# Patient Record
Sex: FEMALE | Race: WHITE | NOT HISPANIC OR LATINO | Employment: OTHER | URBAN - METROPOLITAN AREA
[De-identification: names, ages, dates, MRNs, and addresses within clinical notes are randomized per-mention and may not be internally consistent; named-entity substitution may affect disease eponyms.]

---

## 2018-01-22 ENCOUNTER — OFFICE VISIT CONVERTED (OUTPATIENT)
Dept: FAMILY MEDICINE CLINIC | Facility: CLINIC | Age: 68
End: 2018-01-22
Attending: NURSE PRACTITIONER

## 2018-03-09 ENCOUNTER — OFFICE VISIT CONVERTED (OUTPATIENT)
Dept: FAMILY MEDICINE CLINIC | Facility: CLINIC | Age: 68
End: 2018-03-09
Attending: NURSE PRACTITIONER

## 2018-05-16 ENCOUNTER — OFFICE VISIT CONVERTED (OUTPATIENT)
Dept: FAMILY MEDICINE CLINIC | Facility: CLINIC | Age: 68
End: 2018-05-16
Attending: NURSE PRACTITIONER

## 2018-09-28 ENCOUNTER — CONVERSION ENCOUNTER (OUTPATIENT)
Dept: OTHER | Facility: HOSPITAL | Age: 68
End: 2018-09-28

## 2018-11-14 ENCOUNTER — OFFICE VISIT CONVERTED (OUTPATIENT)
Dept: FAMILY MEDICINE CLINIC | Facility: CLINIC | Age: 68
End: 2018-11-14
Attending: NURSE PRACTITIONER

## 2019-03-04 ENCOUNTER — HOSPITAL ENCOUNTER (OUTPATIENT)
Dept: FAMILY MEDICINE CLINIC | Facility: CLINIC | Age: 69
Discharge: HOME | End: 2019-03-04
Attending: NURSE PRACTITIONER

## 2019-03-04 ENCOUNTER — OFFICE VISIT CONVERTED (OUTPATIENT)
Dept: FAMILY MEDICINE CLINIC | Facility: CLINIC | Age: 69
End: 2019-03-04
Attending: NURSE PRACTITIONER

## 2019-04-30 ENCOUNTER — OFFICE VISIT CONVERTED (OUTPATIENT)
Dept: FAMILY MEDICINE CLINIC | Facility: CLINIC | Age: 69
End: 2019-04-30
Attending: FAMILY MEDICINE

## 2019-08-21 ENCOUNTER — OFFICE VISIT CONVERTED (OUTPATIENT)
Dept: FAMILY MEDICINE CLINIC | Facility: CLINIC | Age: 69
End: 2019-08-21
Attending: FAMILY MEDICINE

## 2019-08-28 ENCOUNTER — OFFICE VISIT CONVERTED (OUTPATIENT)
Dept: FAMILY MEDICINE CLINIC | Facility: CLINIC | Age: 69
End: 2019-08-28
Attending: FAMILY MEDICINE

## 2019-09-11 ENCOUNTER — OFFICE VISIT CONVERTED (OUTPATIENT)
Dept: NEUROLOGY | Facility: CLINIC | Age: 69
End: 2019-09-11
Attending: PSYCHIATRY & NEUROLOGY

## 2019-09-18 ENCOUNTER — HOSPITAL ENCOUNTER (OUTPATIENT)
Dept: FAMILY MEDICINE CLINIC | Facility: CLINIC | Age: 69
Discharge: HOME | End: 2019-09-18
Attending: FAMILY MEDICINE

## 2019-09-18 ENCOUNTER — OFFICE VISIT CONVERTED (OUTPATIENT)
Dept: FAMILY MEDICINE CLINIC | Facility: CLINIC | Age: 69
End: 2019-09-18
Attending: FAMILY MEDICINE

## 2019-09-18 LAB
ALBUMIN SERPL-MCNC: 3.9 G/DL (ref 3.5–5)
ALBUMIN/GLOB SERPL: 1.4 {RATIO} (ref 1.4–2.6)
ALP SERPL-CCNC: 63 U/L (ref 43–160)
ALT SERPL-CCNC: 20 U/L (ref 10–40)
ANION GAP SERPL CALC-SCNC: 18 MMOL/L (ref 8–19)
AST SERPL-CCNC: 23 U/L (ref 15–50)
BILIRUB SERPL-MCNC: 0.37 MG/DL (ref 0.2–1.3)
BUN SERPL-MCNC: 12 MG/DL (ref 5–25)
BUN/CREAT SERPL: 14 {RATIO} (ref 6–20)
CALCIUM SERPL-MCNC: 9.1 MG/DL (ref 8.7–10.4)
CHLORIDE SERPL-SCNC: 100 MMOL/L (ref 99–111)
CONV CO2: 29 MMOL/L (ref 22–32)
CONV TOTAL PROTEIN: 6.7 G/DL (ref 6.3–8.2)
CREAT UR-MCNC: 0.84 MG/DL (ref 0.5–0.9)
EST. AVERAGE GLUCOSE BLD GHB EST-MCNC: 134 MG/DL
GFR SERPLBLD BASED ON 1.73 SQ M-ARVRAT: >60 ML/MIN/{1.73_M2}
GLOBULIN UR ELPH-MCNC: 2.8 G/DL (ref 2–3.5)
GLUCOSE SERPL-MCNC: 77 MG/DL (ref 65–99)
HBA1C MFR BLD: 6.3 % (ref 3.5–5.7)
OSMOLALITY SERPL CALC.SUM OF ELEC: 295 MOSM/KG (ref 273–304)
POTASSIUM SERPL-SCNC: 3.5 MMOL/L (ref 3.5–5.3)
SODIUM SERPL-SCNC: 143 MMOL/L (ref 135–147)

## 2019-11-08 ENCOUNTER — HOSPITAL ENCOUNTER (OUTPATIENT)
Dept: OTHER | Facility: HOSPITAL | Age: 69
Discharge: HOME | End: 2019-11-08
Attending: INTERNAL MEDICINE

## 2019-11-25 ENCOUNTER — HOSPITAL ENCOUNTER (OUTPATIENT)
Dept: FAMILY MEDICINE CLINIC | Facility: CLINIC | Age: 69
Discharge: HOME | End: 2019-11-25
Attending: NURSE PRACTITIONER

## 2019-11-25 ENCOUNTER — OFFICE VISIT CONVERTED (OUTPATIENT)
Dept: FAMILY MEDICINE CLINIC | Facility: CLINIC | Age: 69
End: 2019-11-25
Attending: NURSE PRACTITIONER

## 2019-11-25 LAB
BASOPHILS # BLD AUTO: 0.08 10*3/UL (ref 0–0.2)
BASOPHILS NFR BLD AUTO: 0.8 % (ref 0–3)
CONV ABS IMM GRAN: 0.03 10*3/UL (ref 0–0.2)
CONV IMMATURE GRAN: 0.3 % (ref 0–1.8)
DEPRECATED RDW RBC AUTO: 47.6 FL (ref 36.4–46.3)
EOSINOPHIL # BLD AUTO: 0.22 10*3/UL (ref 0–0.7)
EOSINOPHIL # BLD AUTO: 2.1 % (ref 0–7)
ERYTHROCYTE [DISTWIDTH] IN BLOOD BY AUTOMATED COUNT: 13.4 % (ref 11.7–14.4)
HCT VFR BLD AUTO: 44.6 % (ref 37–47)
HGB BLD-MCNC: 14.2 G/DL (ref 12–16)
LYMPHOCYTES # BLD AUTO: 3.18 10*3/UL (ref 1–5)
LYMPHOCYTES NFR BLD AUTO: 30.9 % (ref 20–45)
MCH RBC QN AUTO: 30.5 PG (ref 27–31)
MCHC RBC AUTO-ENTMCNC: 31.8 G/DL (ref 33–37)
MCV RBC AUTO: 95.9 FL (ref 81–99)
MONOCYTES # BLD AUTO: 0.96 10*3/UL (ref 0.2–1.2)
MONOCYTES NFR BLD AUTO: 9.3 % (ref 3–10)
NEUTROPHILS # BLD AUTO: 5.83 10*3/UL (ref 2–8)
NEUTROPHILS NFR BLD AUTO: 56.6 % (ref 30–85)
NRBC CBCN: 0 % (ref 0–0.7)
PLATELET # BLD AUTO: 249 10*3/UL (ref 130–400)
PMV BLD AUTO: 10.5 FL (ref 9.4–12.3)
RBC # BLD AUTO: 4.65 10*6/UL (ref 4.2–5.4)
WBC # BLD AUTO: 10.3 10*3/UL (ref 4.8–10.8)

## 2019-12-02 ENCOUNTER — HOSPITAL ENCOUNTER (OUTPATIENT)
Dept: OTHER | Facility: HOSPITAL | Age: 69
Discharge: HOME | End: 2019-12-02
Attending: NURSE PRACTITIONER

## 2019-12-28 ENCOUNTER — HOSPITAL ENCOUNTER (OUTPATIENT)
Dept: URGENT CARE | Facility: CLINIC | Age: 69
Discharge: HOME | End: 2019-12-28
Attending: NURSE PRACTITIONER

## 2020-01-09 ENCOUNTER — OFFICE VISIT CONVERTED (OUTPATIENT)
Dept: FAMILY MEDICINE CLINIC | Facility: CLINIC | Age: 70
End: 2020-01-09
Attending: NURSE PRACTITIONER

## 2020-01-10 ENCOUNTER — HOSPITAL ENCOUNTER (OUTPATIENT)
Dept: CARDIOLOGY | Facility: HOSPITAL | Age: 70
Discharge: HOME | End: 2020-01-10
Attending: NURSE PRACTITIONER

## 2020-01-20 ENCOUNTER — CONVERSION ENCOUNTER (OUTPATIENT)
Dept: ORTHOPEDIC SURGERY | Facility: CLINIC | Age: 70
End: 2020-01-20

## 2020-01-20 ENCOUNTER — OFFICE VISIT CONVERTED (OUTPATIENT)
Dept: ORTHOPEDIC SURGERY | Facility: CLINIC | Age: 70
End: 2020-01-20
Attending: PHYSICIAN ASSISTANT

## 2020-03-17 ENCOUNTER — HOSPITAL ENCOUNTER (OUTPATIENT)
Dept: OTHER | Facility: HOSPITAL | Age: 70
Discharge: HOME | End: 2020-03-17

## 2020-03-17 LAB
CREAT BLD-MCNC: 0.9 MG/DL (ref 0.6–1.4)
GFR SERPLBLD BASED ON 1.73 SQ M-ARVRAT: >60 ML/MIN/{1.73_M2}

## 2020-04-17 ENCOUNTER — HOSPITAL ENCOUNTER (OUTPATIENT)
Dept: FAMILY MEDICINE CLINIC | Facility: CLINIC | Age: 70
Discharge: HOME | End: 2020-04-17
Attending: FAMILY MEDICINE

## 2020-04-17 ENCOUNTER — OFFICE VISIT CONVERTED (OUTPATIENT)
Dept: FAMILY MEDICINE CLINIC | Facility: CLINIC | Age: 70
End: 2020-04-17
Attending: FAMILY MEDICINE

## 2020-04-18 LAB
ALBUMIN SERPL-MCNC: 4.2 G/DL (ref 3.5–5)
ALBUMIN/GLOB SERPL: 1.4 {RATIO} (ref 1.4–2.6)
ALP SERPL-CCNC: 49 U/L (ref 43–160)
ALT SERPL-CCNC: 22 U/L (ref 10–40)
ANION GAP SERPL CALC-SCNC: 17 MMOL/L (ref 8–19)
AST SERPL-CCNC: 27 U/L (ref 15–50)
BASOPHILS # BLD AUTO: 0.07 10*3/UL (ref 0–0.2)
BASOPHILS NFR BLD AUTO: 0.6 % (ref 0–3)
BILIRUB SERPL-MCNC: 0.44 MG/DL (ref 0.2–1.3)
BUN SERPL-MCNC: 11 MG/DL (ref 5–25)
BUN/CREAT SERPL: 13 {RATIO} (ref 6–20)
CALCIUM SERPL-MCNC: 9.6 MG/DL (ref 8.7–10.4)
CHLORIDE SERPL-SCNC: 98 MMOL/L (ref 99–111)
CONV ABS IMM GRAN: 0.05 10*3/UL (ref 0–0.2)
CONV CO2: 26 MMOL/L (ref 22–32)
CONV IMMATURE GRAN: 0.5 % (ref 0–1.8)
CONV TOTAL PROTEIN: 7.1 G/DL (ref 6.3–8.2)
CREAT UR-MCNC: 0.87 MG/DL (ref 0.5–0.9)
DEPRECATED RDW RBC AUTO: 45.5 FL (ref 36.4–46.3)
EOSINOPHIL # BLD AUTO: 0.35 10*3/UL (ref 0–0.7)
EOSINOPHIL # BLD AUTO: 3.2 % (ref 0–7)
ERYTHROCYTE [DISTWIDTH] IN BLOOD BY AUTOMATED COUNT: 12.8 % (ref 11.7–14.4)
GFR SERPLBLD BASED ON 1.73 SQ M-ARVRAT: >60 ML/MIN/{1.73_M2}
GLOBULIN UR ELPH-MCNC: 2.9 G/DL (ref 2–3.5)
GLUCOSE SERPL-MCNC: 98 MG/DL (ref 65–99)
HCT VFR BLD AUTO: 43.1 % (ref 37–47)
HGB BLD-MCNC: 13.8 G/DL (ref 12–16)
LYMPHOCYTES # BLD AUTO: 2.12 10*3/UL (ref 1–5)
LYMPHOCYTES NFR BLD AUTO: 19.2 % (ref 20–45)
MAGNESIUM SERPL-MCNC: 1.66 MG/DL (ref 1.6–2.3)
MCH RBC QN AUTO: 30.7 PG (ref 27–31)
MCHC RBC AUTO-ENTMCNC: 32 G/DL (ref 33–37)
MCV RBC AUTO: 95.8 FL (ref 81–99)
MONOCYTES # BLD AUTO: 1.01 10*3/UL (ref 0.2–1.2)
MONOCYTES NFR BLD AUTO: 9.2 % (ref 3–10)
NEUTROPHILS # BLD AUTO: 7.42 10*3/UL (ref 2–8)
NEUTROPHILS NFR BLD AUTO: 67.3 % (ref 30–85)
NRBC CBCN: 0 % (ref 0–0.7)
OSMOLALITY SERPL CALC.SUM OF ELEC: 283 MOSM/KG (ref 273–304)
PLATELET # BLD AUTO: 235 10*3/UL (ref 130–400)
PMV BLD AUTO: 10.8 FL (ref 9.4–12.3)
POTASSIUM SERPL-SCNC: 3.7 MMOL/L (ref 3.5–5.3)
RBC # BLD AUTO: 4.5 10*6/UL (ref 4.2–5.4)
SODIUM SERPL-SCNC: 137 MMOL/L (ref 135–147)
WBC # BLD AUTO: 11.02 10*3/UL (ref 4.8–10.8)

## 2020-05-01 ENCOUNTER — HOSPITAL ENCOUNTER (OUTPATIENT)
Dept: FAMILY MEDICINE CLINIC | Facility: CLINIC | Age: 70
Discharge: HOME | End: 2020-05-01
Attending: FAMILY MEDICINE

## 2020-05-01 LAB
BASOPHILS # BLD AUTO: 0.07 10*3/UL (ref 0–0.2)
BASOPHILS # BLD: 0 % (ref 0–3)
BASOPHILS NFR BLD AUTO: 0.7 % (ref 0–3)
CONV ABS BANDS: 0 % (ref 1–5)
CONV ABS IMM GRAN: 0.03 10*3/UL (ref 0–0.2)
CONV ANISOCYTES: ABNORMAL
CONV ATYPICAL LYMPHOCYTES: 5 % (ref 0–5)
CONV IMMATURE GRAN: 0.3 % (ref 0–1.8)
CONV SEGMENTED NEUTROPHILS: 53 % (ref 45–70)
DEPRECATED RDW RBC AUTO: 47.8 FL (ref 36.4–46.3)
EOSINOPHIL # BLD AUTO: 0.28 10*3/UL (ref 0–0.7)
EOSINOPHIL # BLD AUTO: 2.9 % (ref 0–7)
EOSINOPHIL NFR BLD AUTO: 1 % (ref 0–7)
ERYTHROCYTE [DISTWIDTH] IN BLOOD BY AUTOMATED COUNT: 13.1 % (ref 11.7–14.4)
HCT VFR BLD AUTO: 42.6 % (ref 37–47)
HGB BLD-MCNC: 13.4 G/DL (ref 12–16)
LARGE PLATELETS: ABNORMAL
LYMPHOCYTES # BLD AUTO: 3.64 10*3/UL (ref 1–5)
LYMPHOCYTES NFR BLD AUTO: 37.3 % (ref 20–45)
MCH RBC QN AUTO: 30.9 PG (ref 27–31)
MCHC RBC AUTO-ENTMCNC: 31.5 G/DL (ref 33–37)
MCV RBC AUTO: 98.4 FL (ref 81–99)
MONOCYTES # BLD AUTO: 0.7 10*3/UL (ref 0.2–1.2)
MONOCYTES NFR BLD AUTO: 7.2 % (ref 3–10)
MONOCYTES NFR BLD MANUAL: 5 % (ref 3–10)
NEUTROPHILS # BLD AUTO: 5.03 10*3/UL (ref 2–8)
NEUTROPHILS NFR BLD AUTO: 51.6 % (ref 30–85)
NRBC CBCN: 0 % (ref 0–0.7)
NUC CELL # PRT MANUAL: 0 /100{WBCS}
PLAT MORPH BLD: NORMAL
PLATELET # BLD AUTO: 254 10*3/UL (ref 130–400)
PMV BLD AUTO: 10.2 FL (ref 9.4–12.3)
RBC # BLD AUTO: 4.33 10*6/UL (ref 4.2–5.4)
SMALL PLATELETS BLD QL SMEAR: ADEQUATE
VARIANT LYMPHS NFR BLD MANUAL: 36 % (ref 20–45)
WBC # BLD AUTO: 9.75 10*3/UL (ref 4.8–10.8)

## 2020-05-19 ENCOUNTER — OFFICE VISIT CONVERTED (OUTPATIENT)
Dept: FAMILY MEDICINE CLINIC | Facility: CLINIC | Age: 70
End: 2020-05-19
Attending: NURSE PRACTITIONER

## 2020-05-19 ENCOUNTER — CONVERSION ENCOUNTER (OUTPATIENT)
Dept: FAMILY MEDICINE CLINIC | Facility: CLINIC | Age: 70
End: 2020-05-19

## 2020-06-10 ENCOUNTER — OFFICE VISIT CONVERTED (OUTPATIENT)
Dept: ORTHOPEDIC SURGERY | Facility: CLINIC | Age: 70
End: 2020-06-10
Attending: PHYSICIAN ASSISTANT

## 2020-06-19 ENCOUNTER — HOSPITAL ENCOUNTER (OUTPATIENT)
Dept: OTHER | Facility: HOSPITAL | Age: 70
Discharge: HOME | End: 2020-06-19
Attending: PHYSICIAN ASSISTANT

## 2020-06-24 ENCOUNTER — OFFICE VISIT CONVERTED (OUTPATIENT)
Dept: NEUROLOGY | Facility: CLINIC | Age: 70
End: 2020-06-24
Attending: NURSE PRACTITIONER

## 2020-06-26 ENCOUNTER — OFFICE VISIT CONVERTED (OUTPATIENT)
Dept: ORTHOPEDIC SURGERY | Facility: CLINIC | Age: 70
End: 2020-06-26
Attending: PHYSICIAN ASSISTANT

## 2020-06-29 ENCOUNTER — HOSPITAL ENCOUNTER (OUTPATIENT)
Dept: PREADMISSION TESTING | Facility: HOSPITAL | Age: 70
Discharge: HOME | End: 2020-06-29
Attending: ORTHOPAEDIC SURGERY

## 2020-06-30 LAB — SARS-COV-2 RNA SPEC QL NAA+PROBE: NOT DETECTED

## 2020-07-02 ENCOUNTER — HOSPITAL ENCOUNTER (OUTPATIENT)
Dept: PERIOP | Facility: HOSPITAL | Age: 70
Setting detail: HOSPITAL OUTPATIENT SURGERY
Discharge: HOME | End: 2020-07-02
Attending: ORTHOPAEDIC SURGERY

## 2020-07-15 ENCOUNTER — OFFICE VISIT CONVERTED (OUTPATIENT)
Dept: ORTHOPEDIC SURGERY | Facility: CLINIC | Age: 70
End: 2020-07-15
Attending: PHYSICIAN ASSISTANT

## 2020-08-08 ENCOUNTER — HOSPITAL ENCOUNTER (OUTPATIENT)
Dept: URGENT CARE | Facility: CLINIC | Age: 70
Discharge: HOME | End: 2020-08-08
Attending: EMERGENCY MEDICINE

## 2020-08-19 ENCOUNTER — HOSPITAL ENCOUNTER (OUTPATIENT)
Dept: OTHER | Facility: HOSPITAL | Age: 70
Discharge: HOME | End: 2020-08-19

## 2020-08-19 ENCOUNTER — OFFICE VISIT CONVERTED (OUTPATIENT)
Dept: ORTHOPEDIC SURGERY | Facility: CLINIC | Age: 70
End: 2020-08-19
Attending: PHYSICIAN ASSISTANT

## 2020-08-19 LAB
CREAT BLD-MCNC: 0.7 MG/DL (ref 0.6–1.4)
GFR SERPLBLD BASED ON 1.73 SQ M-ARVRAT: >60 ML/MIN/{1.73_M2}

## 2020-08-26 ENCOUNTER — OFFICE VISIT CONVERTED (OUTPATIENT)
Dept: FAMILY MEDICINE CLINIC | Facility: CLINIC | Age: 70
End: 2020-08-26
Attending: NURSE PRACTITIONER

## 2020-09-29 ENCOUNTER — HOSPITAL ENCOUNTER (OUTPATIENT)
Dept: NEUROLOGY | Facility: HOSPITAL | Age: 70
Discharge: HOME | End: 2020-09-29
Attending: NURSE PRACTITIONER

## 2020-10-16 ENCOUNTER — OFFICE VISIT CONVERTED (OUTPATIENT)
Dept: ORTHOPEDIC SURGERY | Facility: CLINIC | Age: 70
End: 2020-10-16
Attending: PHYSICIAN ASSISTANT

## 2020-10-19 ENCOUNTER — HOSPITAL ENCOUNTER (OUTPATIENT)
Dept: FAMILY MEDICINE CLINIC | Facility: CLINIC | Age: 70
Discharge: HOME | End: 2020-10-19
Attending: NURSE PRACTITIONER

## 2020-10-19 LAB
ALBUMIN SERPL-MCNC: 3.8 G/DL (ref 3.5–5)
ALBUMIN/GLOB SERPL: 1.4 {RATIO} (ref 1.4–2.6)
ALP SERPL-CCNC: 74 U/L (ref 43–160)
ALT SERPL-CCNC: 15 U/L (ref 10–40)
ANION GAP SERPL CALC-SCNC: 13 MMOL/L (ref 8–19)
AST SERPL-CCNC: 16 U/L (ref 15–50)
BASOPHILS # BLD AUTO: 0.06 10*3/UL (ref 0–0.2)
BASOPHILS NFR BLD AUTO: 0.5 % (ref 0–3)
BILIRUB SERPL-MCNC: 0.23 MG/DL (ref 0.2–1.3)
BUN SERPL-MCNC: 17 MG/DL (ref 5–25)
BUN/CREAT SERPL: 21 {RATIO} (ref 6–20)
CALCIUM SERPL-MCNC: 9.7 MG/DL (ref 8.7–10.4)
CHLORIDE SERPL-SCNC: 100 MMOL/L (ref 99–111)
CONV ABS IMM GRAN: 0.14 10*3/UL (ref 0–0.2)
CONV CO2: 32 MMOL/L (ref 22–32)
CONV IMMATURE GRAN: 1.2 % (ref 0–1.8)
CONV TOTAL PROTEIN: 6.6 G/DL (ref 6.3–8.2)
CREAT UR-MCNC: 0.81 MG/DL (ref 0.5–0.9)
DEPRECATED RDW RBC AUTO: 48 FL (ref 36.4–46.3)
EOSINOPHIL # BLD AUTO: 0.11 10*3/UL (ref 0–0.7)
EOSINOPHIL # BLD AUTO: 0.9 % (ref 0–7)
ERYTHROCYTE [DISTWIDTH] IN BLOOD BY AUTOMATED COUNT: 13.9 % (ref 11.7–14.4)
EST. AVERAGE GLUCOSE BLD GHB EST-MCNC: 131 MG/DL
GFR SERPLBLD BASED ON 1.73 SQ M-ARVRAT: >60 ML/MIN/{1.73_M2}
GLOBULIN UR ELPH-MCNC: 2.8 G/DL (ref 2–3.5)
GLUCOSE SERPL-MCNC: 85 MG/DL (ref 65–99)
HBA1C MFR BLD: 6.2 % (ref 3.5–5.7)
HCT VFR BLD AUTO: 42.7 % (ref 37–47)
HGB BLD-MCNC: 13.3 G/DL (ref 12–16)
LYMPHOCYTES # BLD AUTO: 3.07 10*3/UL (ref 1–5)
LYMPHOCYTES NFR BLD AUTO: 26.1 % (ref 20–45)
MCH RBC QN AUTO: 29.6 PG (ref 27–31)
MCHC RBC AUTO-ENTMCNC: 31.1 G/DL (ref 33–37)
MCV RBC AUTO: 94.9 FL (ref 81–99)
MONOCYTES # BLD AUTO: 0.77 10*3/UL (ref 0.2–1.2)
MONOCYTES NFR BLD AUTO: 6.5 % (ref 3–10)
NEUTROPHILS # BLD AUTO: 7.61 10*3/UL (ref 2–8)
NEUTROPHILS NFR BLD AUTO: 64.8 % (ref 30–85)
NRBC CBCN: 0 % (ref 0–0.7)
OSMOLALITY SERPL CALC.SUM OF ELEC: 293 MOSM/KG (ref 273–304)
PLATELET # BLD AUTO: 265 10*3/UL (ref 130–400)
PMV BLD AUTO: 10 FL (ref 9.4–12.3)
POTASSIUM SERPL-SCNC: 3.9 MMOL/L (ref 3.5–5.3)
RBC # BLD AUTO: 4.5 10*6/UL (ref 4.2–5.4)
SODIUM SERPL-SCNC: 141 MMOL/L (ref 135–147)
T4 FREE SERPL-MCNC: 1 NG/DL (ref 0.9–1.8)
TSH SERPL-ACNC: 1.86 M[IU]/L (ref 0.27–4.2)
WBC # BLD AUTO: 11.76 10*3/UL (ref 4.8–10.8)

## 2020-10-20 LAB — HCV AB SER DONR QL: <0.1 S/CO RATIO (ref 0–0.9)

## 2020-10-23 ENCOUNTER — HOSPITAL ENCOUNTER (OUTPATIENT)
Dept: OTHER | Facility: HOSPITAL | Age: 70
Discharge: HOME | End: 2020-10-23
Attending: NURSE PRACTITIONER

## 2020-10-27 ENCOUNTER — OFFICE VISIT CONVERTED (OUTPATIENT)
Dept: NEUROLOGY | Facility: CLINIC | Age: 70
End: 2020-10-27
Attending: NURSE PRACTITIONER

## 2020-11-05 ENCOUNTER — HOSPITAL ENCOUNTER (OUTPATIENT)
Dept: OTHER | Facility: HOSPITAL | Age: 70
Discharge: HOME | End: 2020-11-05
Attending: NURSE PRACTITIONER

## 2020-11-24 ENCOUNTER — OFFICE VISIT CONVERTED (OUTPATIENT)
Dept: FAMILY MEDICINE CLINIC | Facility: CLINIC | Age: 70
End: 2020-11-24
Attending: NURSE PRACTITIONER

## 2021-01-13 ENCOUNTER — HOSPITAL ENCOUNTER (OUTPATIENT)
Dept: FAMILY MEDICINE CLINIC | Facility: CLINIC | Age: 71
Discharge: HOME | End: 2021-01-13
Attending: NURSE PRACTITIONER

## 2021-01-13 ENCOUNTER — OFFICE VISIT CONVERTED (OUTPATIENT)
Dept: FAMILY MEDICINE CLINIC | Facility: CLINIC | Age: 71
End: 2021-01-13
Attending: FAMILY MEDICINE

## 2021-01-14 LAB — SARS-COV-2 RNA SPEC QL NAA+PROBE: NOT DETECTED

## 2021-01-27 ENCOUNTER — OFFICE VISIT CONVERTED (OUTPATIENT)
Dept: NEUROLOGY | Facility: CLINIC | Age: 71
End: 2021-01-27
Attending: NURSE PRACTITIONER

## 2021-03-04 ENCOUNTER — OFFICE VISIT CONVERTED (OUTPATIENT)
Dept: FAMILY MEDICINE CLINIC | Facility: CLINIC | Age: 71
End: 2021-03-04
Attending: NURSE PRACTITIONER

## 2021-03-04 ENCOUNTER — HOSPITAL ENCOUNTER (OUTPATIENT)
Dept: FAMILY MEDICINE CLINIC | Facility: CLINIC | Age: 71
Discharge: HOME | End: 2021-03-04
Attending: NURSE PRACTITIONER

## 2021-03-04 LAB
ALBUMIN SERPL-MCNC: 3.4 G/DL (ref 3.5–5)
ALBUMIN/GLOB SERPL: 1.1 {RATIO} (ref 1.4–2.6)
ALP SERPL-CCNC: 65 U/L (ref 43–160)
ALT SERPL-CCNC: 24 U/L (ref 10–40)
ANION GAP SERPL CALC-SCNC: 17 MMOL/L (ref 8–19)
AST SERPL-CCNC: 28 U/L (ref 15–50)
BASOPHILS # BLD AUTO: 0.08 10*3/UL (ref 0–0.2)
BASOPHILS NFR BLD AUTO: 1 % (ref 0–3)
BILIRUB SERPL-MCNC: 0.2 MG/DL (ref 0.2–1.3)
BUN SERPL-MCNC: 11 MG/DL (ref 5–25)
BUN/CREAT SERPL: 13 {RATIO} (ref 6–20)
CALCIUM SERPL-MCNC: 9.6 MG/DL (ref 8.7–10.4)
CHLORIDE SERPL-SCNC: 97 MMOL/L (ref 99–111)
CONV ABS IMM GRAN: 0.08 10*3/UL (ref 0–0.2)
CONV CO2: 28 MMOL/L (ref 22–32)
CONV IMMATURE GRAN: 1 % (ref 0–1.8)
CONV TOTAL PROTEIN: 6.4 G/DL (ref 6.3–8.2)
CREAT UR-MCNC: 0.84 MG/DL (ref 0.5–0.9)
DEPRECATED RDW RBC AUTO: 47 FL (ref 36.4–46.3)
EOSINOPHIL # BLD AUTO: 0.62 10*3/UL (ref 0–0.7)
EOSINOPHIL # BLD AUTO: 7.6 % (ref 0–7)
ERYTHROCYTE [DISTWIDTH] IN BLOOD BY AUTOMATED COUNT: 13.6 % (ref 11.7–14.4)
FOLATE SERPL-MCNC: 6.1 NG/ML (ref 4.8–20)
GFR SERPLBLD BASED ON 1.73 SQ M-ARVRAT: >60 ML/MIN/{1.73_M2}
GLOBULIN UR ELPH-MCNC: 3 G/DL (ref 2–3.5)
GLUCOSE SERPL-MCNC: 124 MG/DL (ref 65–99)
HCT VFR BLD AUTO: 32.9 % (ref 37–47)
HGB BLD-MCNC: 10.6 G/DL (ref 12–16)
LYMPHOCYTES # BLD AUTO: 1.35 10*3/UL (ref 1–5)
LYMPHOCYTES NFR BLD AUTO: 16.5 % (ref 20–45)
MCH RBC QN AUTO: 30.8 PG (ref 27–31)
MCHC RBC AUTO-ENTMCNC: 32.2 G/DL (ref 33–37)
MCV RBC AUTO: 95.6 FL (ref 81–99)
MONOCYTES # BLD AUTO: 0.67 10*3/UL (ref 0.2–1.2)
MONOCYTES NFR BLD AUTO: 8.2 % (ref 3–10)
NEUTROPHILS # BLD AUTO: 5.37 10*3/UL (ref 2–8)
NEUTROPHILS NFR BLD AUTO: 65.7 % (ref 30–85)
NRBC CBCN: 0 % (ref 0–0.7)
OSMOLALITY SERPL CALC.SUM OF ELEC: 287 MOSM/KG (ref 273–304)
PLATELET # BLD AUTO: 262 10*3/UL (ref 130–400)
PMV BLD AUTO: 10.5 FL (ref 9.4–12.3)
POTASSIUM SERPL-SCNC: 3.5 MMOL/L (ref 3.5–5.3)
RBC # BLD AUTO: 3.44 10*6/UL (ref 4.2–5.4)
SODIUM SERPL-SCNC: 138 MMOL/L (ref 135–147)
VIT B12 SERPL-MCNC: 624 PG/ML (ref 211–911)
WBC # BLD AUTO: 8.17 10*3/UL (ref 4.8–10.8)

## 2021-03-05 LAB
FERRITIN SERPL-MCNC: 332 NG/ML (ref 10–200)
IRON SATN MFR SERPL: 20 % (ref 20–55)
IRON SERPL-MCNC: 51 UG/DL (ref 60–170)
TIBC SERPL-MCNC: 260 UG/DL (ref 245–450)
TRANSFERRIN SERPL-MCNC: 182 MG/DL (ref 250–380)

## 2021-04-01 ENCOUNTER — HOSPITAL ENCOUNTER (OUTPATIENT)
Dept: OTHER | Facility: HOSPITAL | Age: 71
Discharge: HOME | End: 2021-04-01

## 2021-04-27 ENCOUNTER — OFFICE VISIT CONVERTED (OUTPATIENT)
Dept: NEUROLOGY | Facility: CLINIC | Age: 71
End: 2021-04-27
Attending: NURSE PRACTITIONER

## 2021-04-29 ENCOUNTER — CONVERSION ENCOUNTER (OUTPATIENT)
Dept: FAMILY MEDICINE CLINIC | Facility: CLINIC | Age: 71
End: 2021-04-29

## 2021-04-29 ENCOUNTER — OFFICE VISIT CONVERTED (OUTPATIENT)
Dept: FAMILY MEDICINE CLINIC | Facility: CLINIC | Age: 71
End: 2021-04-29
Attending: NURSE PRACTITIONER

## 2021-05-07 NOTE — PROGRESS NOTES
"   Progress Note      Patient Name: Suzanne Gutierrez   Patient ID: [id number]   Sex: Female   YOB: 1950        Visit Date: April 17, 2020    Provider: Delta Scott MD   Location: [facility]   Location Address: [address] [place] [address] [place]  [address]   Location Phone: [phone]          Chief Complaint     legs/hips hurting during the night, and has had diarrhea and headache for about 1 week.       History Of Present Illness  Suzanne Gutierrez is a 69 year old /White female who presents for evaluation and treatment of:      symptoms x 1 week- had been sick but is feeling better today- had had diarrhea and headache earlier in week- these symptoms resolved as of today- all symptoms have resolved except for both hips and legs are hurting x 1 day- no known injury- pt had feeling of needing to move legs- movement helps symptoms- sitting still worsens symptoms- aching pain that begins in both hips and goes down both legs to posterior knees- no swelling, or redness or warmth       Past Medical History  Disease Name Date Onset Notes   Insomnia --  --    Pulmonary fibrosis --  --    Screening Mammogram 10/11/18 --    Sleep apnea --  --          Past Surgical History  Procedure Name Date Notes   Cholecstectomy --  --    Hysterectomy --  --    Tonsillectomy --  --    Tubal ligation --  --          Medication List  Name Date Started Instructions   Actimmune 100 mcg/0.5 mL subcutaneous solution  inject 1 milliliter by subcutaneous route   amitriptyline 100 mg oral tablet 03/02/2020 take 1 tablet (100 mg) by oral route once daily at bedtime for 90 days   BD Insulin Syringe Ultra-Fine 0.5 mL 30 gauge x 1/2\" miscellaneous syringe 12/11/2018 USE THREE TIMES WEEKLY   BD Specialty Use Needles 30 gauge x 1/2\" miscellaneous needle 11/14/2018 use as directed for 90 days use for the 3 days weekly inections of the actimune   BD Syringe 1 mL miscellaneous syringe 11/14/2018 use as directed " for 90 days use for 3 days weekly injections of the actimune   ipratropium-albuterol 0.5 mg-3 mg(2.5 mg base)/3 mL inhalation solution for nebulization 01/29/2020 use in nebulizer as directed 3 times a day for 30 days   Symbicort 160-4.5 mcg/actuation inhalation HFA aerosol inhaler  inhale 2 puffs by inhalation route 2 times per day in the morning and evening   Ventolin HFA 90 mcg/actuation inhalation HFA aerosol inhaler  inhale 1 puff (90 mcg) by inhalation route every 6 hours as needed         Allergy List  Allergen Name Date Reaction Notes   Keflex --  --  --        Allergies Reconciled  Family Medical History  Disease Name Relative/Age Notes   Alcohol abuse Father/   Father   Family History Of Breast Cancer Sister/   Sister         Social History  Finding Status Start/Stop Quantity Notes   Alcohol Never --/-- --  never drinks alcohol   Exercises regularly --  --/-- --  0 times per week   Recreational Drug Use Never --/-- --  never used   Second hand smoke exposure Unknown --/-- --  no   Tobacco Former --/54 0.5 PPD smokes less than 1 pack per day, for 15 years, in the past used other tobacco products   Uses seatbelts --  --/-- --  yes         Immunizations  NameDate Admin Mfg Trade Name Lot Number Route Inj VIS Given VIS Publication   [id number]/03/2019 NE Not Entered  NE NE 07/05/2019    Comments:    Ryxdcllk79/03/2019 NE Not Entered  NE NE 07/05/2019    Comments:          Review of Systems  · Constitutional  o Denies  o : fatigue, fever, chills, body aches  · HENT  o Denies  o : nasal congestion, nasal discharge, sore throat, ear pain  · Cardiovascular  o Denies  o : chest pain, palpitations  · Respiratory  o Denies  o : shortness of breath, cough  · Gastrointestinal  o Admits  o : diarrhea  o Denies  o : nausea, vomiting, abdominal pain  · Integument  o Denies  o : rash  · Musculoskeletal  o Admits  o : hip pain      Vitals  Date Time BP Position Site L\R Cuff Size HR RR TEMP (F) WT  HT  BMI kg/m2 BSA m2  O2 Sat        04/17/2020 01:37 /80 Sitting    121 - R  97.6     91 %          Physical Examination  · Constitutional  o Appearance  o : well developed, well-nourished, in no acute distress  · Head and Face  o HEENT  o : Unremarkable, MMM  · Respiratory  o Respiratory Effort  o : breathing unlabored  o Auscultation of Lungs  o : clear to ascultation  · Cardiovascular  o Heart  o :   § Auscultation of Heart  § : regular rate and rhythm  o Peripheral Vascular System  o :   § Extremities  § : no edema  · Gastrointestinal  o Abdomen  o : soft, non-tender, non-distended, + bowel sounds, no hepatosplenomegaly, no masses palpated  · Musculoskeletal  o General  o :   § General Musculoskeletal  § : No joint swelling or deformity., Muscle tone, strength, and development grossly normal. Bilateral hip slight tenderness on movement only, no swelling, redness, or warmth  · Neurologic  o Gait and Station  o :   § Gait Screening  § : normal gait  · Psychiatric  o Mood and Affect  o : mood normal, affect appropriate          Assessment  · Hip pain     719.45/M25.559  · Leg pain     729.5/M79.606      Plan  · Orders  o CBC with Auto Diff [facility] (34526) - 719.45/M25.559, 729.5/M79.606 - 04/17/2020  o CMP [facility] ([id number]) - 719.45/M25.559, 729.5/M79.606 - 04/17/2020  o ACO-39: Current medications updated and reviewed () - - 04/17/2020  o Magnesium level (07855) - 719.45/M25.559, 729.5/M79.606 - 04/17/2020  o Xray hip right 2 or more views (includes AP Pelvis) [facility] (95962) - 719.45/M25.559, 729.5/M79.606 - 04/17/2020  o Xray hip left 2 or more views (includes AP Pelvis) [facility] (01973) - 719.45/M25.559, 729.5/M79.606 - 04/17/2020  · Medications  o prednisone 20 mg oral tablet   SIG: take 3 tablets by oral route daily for 5 days   DISP: (15) tablets with 0 refills  Prescribed on 04/17/2020     o Medications have been Reconciled  o Transition of Care or Provider Policy  · Instructions  o Patient was  educated/instructed on their diagnosis, treatment and medications prior to discharge from the clinic today.            Electronically Signed by: Delta Scott MD -Author on April 17, 2020 02:42:36 PM

## 2021-05-07 NOTE — PROGRESS NOTES
"   Progress Note      Patient Name: Suzanne Gutierrez   Patient ID: [id number]   Sex: Female   YOB: 1950        Visit Date: April 29, 2021    Provider: ALBAN Severino   Location: [facility]   Location Address: [address] [place] [address]   [place]  [address]   Location Phone: [phone]          Chief Complaint     PATIENT IS HERE FOR SINUS PRESSURE AND PAIN.  HER EARS ARE ITCHING AND SHE HAS A SLIGHT COUGH.  THIS ALL HAS BEEN GOING ON FOR ABOUT 3 WEEKS.      SHE HAS SOME RASH OR SOMETHING GOING ON WITH HER LEFT LITTLE TOE.       History Of Present Illness  Suzanne Gutierrez is a 70 year old /White female who presents for evaluation and treatment of:      PT HERE TODAY FOR THE SYMPTOMS OF THE PAST 3 WEEKS SINUS INFECTION LIKE PRESSURE AND THEN DRAINS TO THROAT INTO THE CHEST--AND THEN PT WITH PULMONARY FIBROSIS AND ON O2 AT HOME AND BEEN USING HER NEB TXS AS WELL     no fever  no loss of tatse or smell  no N/V/D       Past Medical History  Disease Name Date Onset Notes   Insomnia --  --    Pre-diabetes --  --    Pulmonary fibrosis --  --    Screening Mammogram 10/11/18 --    Sleep apnea --  --    Vertigo --  --          Past Surgical History  Procedure Name Date Notes   Cholecstectomy --  --    Hysterectomy --  --    Tonsillectomy --  --    Tubal ligation --  --          Medication List  Name Date Started Instructions   Actimmune 100 mcg/0.5 mL subcutaneous solution  inject 1 milliliter by subcutaneous route   albuterol sulfate 90 mcg/actuation inhalation HFA aerosol inhaler  --    Replaced/Ret[employer] Drug 90 mcg/Actuation inhalation aerosol 12/15/2020 INHALE 1 PUFF BY INHALATION ROUTE EVERY 6 HOURS AS NEEDED FOR 30 DAYS   amitriptyline 100 mg oral tablet 02/16/2021 TAKE 1 TABLET BY MOUTH EVERY NIGHT AT BEDTIME   aspirin 81 mg oral tablet,chewable  chew 1 tablet (81 mg) by oral route once daily   BD Insulin Syringe Ultra-Fine 0.5 mL 30 gauge x 1/2\" miscellaneous syringe " "12/11/2018 USE THREE TIMES WEEKLY   BD Specialty Use Needles 30 gauge x 1/2\" miscellaneous needle 11/14/2018 use as directed for 90 days use for the 3 days weekly inections of the actimune   BD Syringe 1 mL miscellaneous syringe 11/14/2018 use as directed for 90 days use for 3 days weekly injections of the actimune   cholecalciferol (vitamin D3) 125 mcg (5,000 unit) oral capsule  take 1 capsule by oral route daily   famotidine 20 mg oral tablet  take 1 tablet (20 mg) by oral route 2 times per day   fluticasone propionate 50 mcg/actuation nasal spray,suspension  spray 1 spray (50 mcg) in each nostril by intranasal route once daily   hydrochlorothiazide 12.5 mg oral tablet 03/11/2021 take 1 tablet by oral route daily as needed for 30 days for pedal edema   ipratropium-albuterol 0.5 mg-3 mg(2.5 mg base)/3 mL inhalation solution for nebulization 08/26/2020 use in nebulizer as directed 3 times a day for 30 days   levetiracetam 500 mg oral tablet  take 1 tablet (500 mg) by oral route 2 times per day   meclizine 25 mg oral tablet 03/26/2021 TAKE 1 TABLET BY MOUTH THREE TIMES DAILY AS NEEDED FOR DIZZINESS   Prevnar 13 (PF) 0.5 mL intramuscular syringe 11/24/2020 inject 0.5 milliliter by intramuscular route once for 1 day   T.E.D. Knee Length-M-Long miscellaneous misc 03/04/2021 use as directed for 90 days wear during the daytime   vancomycin in 0.9 % sodium chl intravenous 02/17/2021 Intravenous Infusion X 3 days; to end 02/20/2021   Ventolin HFA 90 mcg/actuation inhalation HFA aerosol inhaler  inhale 1 puff (90 mcg) by inhalation route every 4-6 hours as needed         Allergy List  Allergen Name Date Reaction Notes   Keflex --  --  --          Family Medical History  Disease Name Relative/Age Notes   Alcohol abuse Father/   Father   Family History Of Breast Cancer Sister/   Sister         Social History  Finding Status Start/Stop Quantity Notes   Alcohol Never --/-- --  never drinks alcohol   Exercises regularly --  --/-- " "--  0 times per week   Recreational Drug Use Never --/-- --  never used   Second hand smoke exposure Unknown --/-- --  no   Tobacco Former --/54 0.5 PPD smokes less than 1 pack per day, for 15 years, in the past used other tobacco products   Uses seatbelts --  --/-- --  yes         Immunizations  NameDate Admin Mfg Trade Name Lot Number Route Inj VIS Given VIS Publication   [id number]/03/2019 NE Not Entered  NE NE 07/05/2019    Comments:    Rnxlfiar36/03/2019 NE Not Entered  NE NE 07/05/2019    Comments:          Review of Systems  · Constitutional  o Denies  o : fever, chills, body aches  · HENT  o Admits  o : sinus pain, nasal congestion, nasal discharge, postnasal drip  · Cardiovascular  o Denies  o : chest pain, irregular heart beats  · Respiratory  o Admits  o : shortness of breath, abnormal sputum production, productive cough  o Denies  o : wheezing, cough, dry cough  · Gastrointestinal  o Denies  o : nausea, vomiting, diarrhea  · Integument  o Admits  o : rash, skin dryness  o Denies  o : itching  · Musculoskeletal  o Denies  o : joint swelling, limitation of motion  · Psychiatric  o Denies  o : anxiety, depression  · Allergic-Immunologic  o Denies  o : frequent illnesses      Vitals  Date Time BP Position Site L\R Cuff Size HR RR TEMP (F) WT  HT  BMI kg/m2 BSA m2 O2 Sat FR L/min FiO2        04/29/2021 04:03 /60 Sitting    76 - R  97.8 205lbs 16oz 5'  3.5\" 35.92 2.05 95 %            Physical Examination  · Constitutional  o Appearance  o : well developed, well-nourished, in no acute distress  · Head and Face  o HEENT  o : Unremarkable--left TM slight red and OP good and then the (+) bilat frontal and maxilla sinus tendenress   · Eyes  o Conjunctivae  o : conjunctivae normal  · Neck  o Inspection/Palpation  o : supple  o Thyroid  o : no thyromegaly  · Respiratory  o Respiratory Effort  o : breathing unlabored  o Auscultation of Lungs  o : clear to ascultation  · Cardiovascular  o Heart  o : "   § Auscultation of Heart  § : regular rate and rhythm  o Peripheral Vascular System  o :   § Extremities  § : no edema  · Lymphatic  o Neck  o : no lymphadenopathy present  · Musculoskeletal  o General  o :   § General Musculoskeletal  § : No joint swelling or deformity., Muscle tone, strength, and development grossly normal.  · Skin and Subcutaneous Tissue  o General Inspection  o : skin turgor normal, texture normal--to the left posterior heel and tot he 4th toe--small red scattered rash and rough and dry and raised and irritated nd cental clearing   · Neurologic  o Gait and Station  o :   § Gait Screening  § : normal gait  · Psychiatric  o Mood and Affect  o : mood normal, affect appropriate          Assessment  · Dermatitis     692.9/L30.9  · Sinusitis, acute     461.9/J01.90  · Upper respiratory infection     465.9/J06.9  · Otitis media     382.9/H66.90  · Pulmonary fibrosis     515/J84.10      Plan  · Orders  o ACO-39: Current medications updated and reviewed (1159F, ) - - 04/29/2021  · Medications  o clotrimazole-betamethasone 1-0.05 % topical cream   SIG: apply to the affected and surrounding areas of skin by topical route 2 times per day in the morning and evening for 2 weeks   DISP: (45) Gram with 0 refills  Prescribed on 04/29/2021     o azithromycin 250 mg oral tablet   SIG: take 2 tablets (500 mg) by oral route once daily for 1 day then 1 tablet (250 mg) by oral route once daily for 4 days   DISP: (6) Tablet with 0 refills  Prescribed on 04/29/2021     o Medrol (Mark) 4 mg oral tablets,dose pack   SIG: take by oral route as directed per package instructions for 6 days   DISP: (1) Dose Pack with 0 refills  Prescribed on 04/29/2021     o Medications have been Reconciled  o Transition of Care or Provider Policy  · Instructions  o Take all medications as prescribed/directed.  o Rest. Increase Fluids.  o Patient was educated/instructed on their diagnosis, treatment and medications prior to discharge  from the clinic today.  o Patient instructed to seek medical attention urgently for new or worsening symptoms.  o Call the office with any concerns or questions.  o Risks, benefits, and alternatives were discussed with the patient. The patient is aware of risks associated with: med mgt  o Chronic conditions reviewed and taken into consideration for today's treatment plan.  · Disposition  o Call or Return if symptoms worsen or persist.            Electronically Signed by: ALBAN Severino -Author on April 29, 2021 04:18:46 PM

## 2021-05-07 NOTE — PROGRESS NOTES
Progress Note      Patient Name: Suzanne Gutierrez   Patient ID: [id number]   Sex: Female   YOB: 1950        Visit Date: January 9, 2020    Provider: KEVIN Severino   Location: [facility]   Location Address: [address] [place] [address]   [place]  [address]   Location Phone: [phone]          Chief Complaint     PATIENT IS HERE WITH RIGHT KNEE PAIN.  THIS HAS BEEN GOING ON FOR ABOUT 2 WEEKS.  SHE WENT TO THE URGENT CARE ON [address] AND THEY SAID SHE HAD FLUID ON HER RIGHT KNEE AND GAVE HER SOMETHING FOR ARTRITIS PAIN.  SHE SAID IT IS NOT WORKING AND IT IS STILL HURTING.       History Of Present Illness  Suzanne Gutierrez is a 69 year old /White female who presents for evaluation and treatment of:      pt reports the right knee pain started 2 weeks ago and then last week went ot he the acute care on [address]--and they did an xray of the knee--and pt was treated for a joint inflammation and gave her an OA med    nd the xrays reports says (+) right knee joint effusion and mild DJD as well-    pt reports the med not helped at all and pt used the instructed ice and elevate and ACE wrap and the NSAID and nothing helping     just was doing a lot of standing during the holidays and then the pt does recall an injury     just the persistent swelling and pain of the right knee    but also pain to the back of the leg and started in the calf and goes up    and pt reports they told her there at the acute care that it could be a blood clot or it could be a joint effusion       Past Medical History  Disease Name Date Onset Notes   Insomnia --  --    Pulmonary fibrosis --  --    Screening Mammogram 10/11/18 --    Sleep apnea --  --          Past Surgical History  Procedure Name Date Notes   Cholecstectomy --  --    Hysterectomy --  --    Tonsillectomy --  --    Tubal ligation --  --          Medication List  Name Date Started Instructions   Actimmune 100 mcg/0.5 mL subcutaneous  "solution  inject 1 milliliter by subcutaneous route   amitriptyline 100 mg oral tablet 11/25/2019 take 1 tablet (100 mg) by oral route once daily at bedtime   BD Insulin Syringe Ultra-Fine 0.5 mL 30 gauge x 1/2\" miscellaneous syringe 12/11/2018 USE THREE TIMES WEEKLY   BD Specialty Use Needles 30 gauge x 1/2\" miscellaneous needle 11/14/2018 use as directed for 90 days use for the 3 days weekly inections of the actimune   BD Syringe 1 mL miscellaneous syringe 11/14/2018 use as directed for 90 days use for 3 days weekly injections of the actimune   ipratropium-albuterol 0.5 mg-3 mg(2.5 mg base)/3 mL inhalation solution for nebulization 12/20/2019 use in nebulizer as directed 3 times a day for 30 days   Probiotic 20 billion cell oral capsule 11/25/2019 take 1 capsule by oral route 2 times a day for 10 days   Symbicort 160-4.5 mcg/actuation inhalation HFA aerosol inhaler  inhale 2 puffs by inhalation route 2 times per day in the morning and evening   Ventolin HFA 90 mcg/actuation inhalation HFA aerosol inhaler  inhale 1 puff (90 mcg) by inhalation route every 6 hours as needed   Zofran 4 mg oral tablet 11/25/2019 take 1 tablet by oral route 3 times a day as needed for 7 days         Allergy List  Allergen Name Date Reaction Notes   Keflex --  --  --        Allergies Reconciled  Family Medical History  Disease Name Relative/Age Notes   Alcohol abuse Father/   Father   Family History Of Breast Cancer Sister/   Sister         Social History  Finding Status Start/Stop Quantity Notes   Alcohol Never --/-- --  never drinks alcohol   Exercises regularly --  --/-- --  0 times per week   Recreational Drug Use Never --/-- --  never used   Second hand smoke exposure Unknown --/-- --  no   Tobacco Former --/54 0.5 PPD smokes less than 1 pack per day, for 15 years, in the past used other tobacco products   Uses seatbelts --  --/-- --  yes         Immunizations  NameDate Admin Mfg Trade Name Lot Number Route Inj VIS Given VIS " "Publication   [id number]/03/2019 NE Not Entered  NE NE 07/05/2019    Comments:    Tokpucdc11/03/2019 NE Not Entered  NE NE 07/05/2019    Comments:          Review of Systems  · Constitutional  o Denies  o : fever  · Cardiovascular  o Denies  o : chest pain, irregular heart beats  · Respiratory  o Admits  o : reviewed and unchanged  · Gastrointestinal  o Denies  o : nausea, vomiting  · Neurologic  o Denies  o : altered mental status, tingling or numbness, seizures  · Musculoskeletal  o Admits  o : joint pain, joint swelling, muscle pain  o Denies  o : limitation of motion  · Heme-Lymph  o Denies  o : petechiae, lymph node enlargement or tenderness  · Allergic-Immunologic  o Denies  o : frequent illnesses      Vitals  Date Time BP Position Site L\R Cuff Size HR RR TEMP (F) WT  HT  BMI kg/m2 BSA m2 O2 Sat HC       01/09/2020 02:59 /72 Sitting    110 - R  97 206lbs 7oz 5'  4\" 35.43 2.06 97 %          Physical Examination  · Constitutional  o Appearance  o : well developed, well-nourished, in no acute distress  · Head and Face  o HEENT  o : Unremarkable  · Eyes  o Conjunctivae  o : conjunctivae normal  · Neck  o Inspection/Palpation  o : supple  o Thyroid  o : no thyromegaly  · Respiratory  o Respiratory Effort  o : breathing unlabored  o Auscultation of Lungs  o : clear to ascultation  · Cardiovascular  o Heart  o :   § Auscultation of Heart  § : regular rate and rhythm  o Peripheral Vascular System  o :   § Extremities  § : no edema  · Gastrointestinal  o Abdominal Examination  o :   § Abdomen  § : soft  · Lymphatic  o Neck  o : no lymphadenopathy present  · Musculoskeletal  o General  o :   § General Musculoskeletal  § : (+) RLE PTTP of the calf and up in tot he posterior hamstring muscle and behind the knee- then also tenderness to the right knee to palpation as well and the knee if slightly more swelled than the left one   · Skin and Subcutaneous Tissue  o General Inspection  o : skin turgor normal, texture " normal  · Neurologic  o Gait and Station  o :   § Gait Screening  § : normal gait  · Psychiatric  o Mood and Affect  o : mood normal, affect appropriate          Assessment  · Follow up     V67.9/Z09  · Leg pain, posterior, right     729.5/M79.604  · Leg swelling     729.81/M79.89  · Knee pain, right     719.46/M25.561      Plan  · Orders  o ACO-39: Current medications updated and reviewed () - - 01/09/2020  o Color Doppler ultrasound of veins of right lower extremity (08869) - V67.9/Z09, 729.5/M79.604, 729.81/M79.89 - 01/09/2020   started 12/30/19 and pt was seen in the acute care setting and xrays done and really was (-) pt with continued pain to the calf and behind the knee and posterior thigh  · Medications  o prednisone 10 mg oral tablet   SIG: take 1 tablet (10 mg) by oral route 2 times per day for 5 days   DISP: (10) tablets with 0 refills  Prescribed on 01/09/2020     o Augmentin 875-125 mg oral tablet   SIG: take 1 tablet by oral route every 12 hours for 10 days   DISP: (20) tablets with 0 refills  Discontinued on 01/09/2020     o Medications have been Reconciled  o Transition of Care or Provider Policy  · Instructions  o Patient was educated/instructed on their diagnosis, treatment and medications prior to discharge from the clinic today.  · Disposition  o Call or Return if symptoms worsen or persist.     call pt with results of the venous Doppler and then if all negative then if still hurting with the steroids then we will refer to ortho             Electronically Signed by: KEVIN Severino -Author on January 9, 2020 04:21:21 PM

## 2021-05-07 NOTE — PROGRESS NOTES
"   Progress Note      Patient Name: Suzanne Gutierrez   Patient ID: [id number]   Sex: Female   YOB: 1950    Referring Provider: Chelita Simpson MD    Visit Date: August 28, 2019    Provider: Chelita Simpson MD   Location: [facility]   Location Address: [address] [place] [address]   [place]  [id number]   Location Phone: [phone]          Chief Complaint     still coughing and chest tightness       History Of Present Illness  Suzanne Gutierrez is a 69 year old /White female who presents for evaluation and treatment of:      She is not better.  Now she is bringing up colored phlegm.  She was only doing the nebulizer BID .  We will increase to QID.  She has not had any high fevers.       Past Medical History  Disease Name Date Onset Notes   Insomnia --  --    Pulmonary fibrosis --  --    Screening Mammogram 10/11/18 --    Sleep apnea --  --          Past Surgical History  Procedure Name Date Notes   Cholecstectomy --  --    Hysterectomy --  --    Tonsillectomy --  --    Tubal ligation --  --          Medication List  Name Date Started Instructions   Actimmune 100 mcg/0.5 mL subcutaneous solution  inject 1 milliliter by subcutaneous route   amitriptyline 100 mg oral tablet 04/30/2019 take 1 tablet (100 mg) by oral route once daily at bedtime for 90 days   BD Insulin Syringe Ultra-Fine 0.5 mL 30 gauge x 1/2\" miscellaneous syringe 12/11/2018 USE THREE TIMES WEEKLY   BD Specialty Use Needles 30 gauge x 1/2\" miscellaneous needle 11/14/2018 use as directed for 90 days use for the 3 days weekly inections of the actimune   BD Syringe 1 mL miscellaneous syringe 11/14/2018 use as directed for 90 days use for 3 days weekly injections of the actimune   ipratropium-albuterol 0.5 mg-3 mg(2.5 mg base)/3 mL inhalation solution for nebulization 11/14/2018 use in nebulizer as directed 3 times a day for 30 days   meclizine 25 mg oral tablet 05/22/2019 take 1 tablet (25 mg) by oral route 3 times " "per day as needed for 20 days   prednisone 10 mg oral tablet 08/28/2019 take 1 tablet by oral route 2 times a day for 20 days   Symbicort 160-4.5 mcg/actuation inhalation HFA aerosol inhaler  inhale 2 puffs by inhalation route 2 times per day in the morning and evening   Ventolin HFA 90 mcg/actuation inhalation HFA aerosol inhaler  inhale 1 puff (90 mcg) by inhalation route every 6 hours as needed         Allergy List  Allergen Name Date Reaction Notes   Keflex --  --  --          Family Medical History  Disease Name Relative/Age Notes   Alcohol Abuse Father/   Father   Family History Of Breast Cancer Sister/   Sister         Social History  Finding Status Start/Stop Quantity Notes   Alcohol Never --/-- --  never drinks alcohol   Exercises regularly --  --/-- --  0 times per week   Recreational Drug Use Never --/-- --  never used   Second hand smoke exposure Unknown --/-- --  no   Tobacco Former --/54 0.5 PPD smokes less than 1 pack per day, for 15 years, in the past used other tobacco products   Uses seatbelts --  --/-- --  yes         Immunizations  NameDate Admin Mfg Trade Name Lot Number Route Inj VIS Given VIS Publication   [id number]/03/2019 NE Not Entered  NE NE 07/05/2019    Comments:    Yyettufl88/03/2019 NE Not Entered  NE NE 07/05/2019    Comments:          Vitals  Date Time BP Position Site L\R Cuff Size HR RR TEMP (F) WT  HT  BMI kg/m2 BSA m2 O2 Sat        08/28/2019 02:15 /80 Sitting    102 - R  96.4 207lbs 6oz 5'  4\" 35.6 2.06 94 %          Physical Examination  · Constitutional  o Appearance  o : well developed, well-nourished, occasional cough  · Eyes  o Conjunctivae  o : conjunctivae normal  · Neck  o Inspection/Palpation  o : supple  o Thyroid  o : no thyromegaly  · Respiratory  o Respiratory Effort  o : breathing unlabored  o Auscultation of Lungs  o : noisy in both bases of posterior lungs  · Cardiovascular  o Heart  o :   § Auscultation of Heart  § : regular rate and " rhythm  o Peripheral Vascular System  o :   § Extremities  § : no edema  · Musculoskeletal  o General  o :   § General Musculoskeletal  § : Muscle tone, strength, and development grossly normal.  · Skin and Subcutaneous Tissue  o General Inspection  o : normal  · Neurologic  o Gait and Station  o :   § Gait Screening  § : normal gait  · Psychiatric  o Mood and Affect  o : mood normal, affect appropriate          Assessment  · Bronchitis, acute     466.0/J20.9      Plan  · Orders  o ACO-39: Current medications updated and reviewed () - - 08/28/2019  · Medications  o Levaquin 500 mg oral tablet   SIG: take 1 tablet (500 mg) by oral route once daily for 7 days   DISP: (7) tablets with 0 refills  Prescribed on 08/28/2019     o prednisone 10 mg oral tablet   SIG: take 1 tablet by oral route 2 times a day for 20 days   DISP: (40) tablets with 0 refills  Adjusted on 08/28/2019     · Instructions  o Patient was educated/instructed on their diagnosis, treatment and medications prior to discharge from the clinic today.            Electronically Signed by: Chelita Simpson MD -Author on August 30, 2019 04:59:23 PM

## 2021-05-07 NOTE — PROGRESS NOTES
Progress Note      Patient Name: Suzanne Gutierrez   Patient ID: [id number]   Sex: Female   YOB: 1950        Visit Date: November 14, 2018    Provider: KEVIN Severino   Location: [facility]   Location Address: [address] [place] [address]  [id number]   Location Phone: [phone]          Chief Complaint     refills and check up       History Of Present Illness  Suzanne Gutierrez is a 68 year old /White female who presents for evaluation and treatment of:      pt here for her refills on the Elavil--for sleep--working well    then the refills on her duoneb and the syringes with needle and needles for the injections she takes for the fibrosis --her pulmonary MD placed her on this and really does her good with her breathing--    sees pulmonary regularly and F/u in Dec 3 2018     and over the weekend left ear canal with blood from it and painful since    pt leaving for [place] the day after Thanksgiving-to see her grandson and her great grand will turn 2 y/o       Past Medical History  Disease Name Date Onset Notes   Insomnia --  --    Pulmonary fibrosis --  --    Sleep apnea --  --          Past Surgical History  Procedure Name Date Notes   Cholecstectomy --  --    Hysterectomy --  --    Tonsillectomy --  --    Tubal ligation --  --          Medication List  Name Date Started Instructions   Actimmune 100 mcg/0.5 mL subcutaneous solution  inject 1 milliliter by subcutaneous route   amitriptyline 100 mg oral tablet 05/16/2018 take 1 tablet (100 mg) by oral route once daily at bedtime   fluticasone 50 mcg/actuation nasal spray,suspension 03/13/2018 INHALE 2 SPRAYS INTO EACH NOSTRIL ONCE DAILY   ipratropium-albuterol 0.5 mg-3 mg(2.5 mg base)/3 mL inhalation solution for nebulization  inhale 3 milliliters by nebulization route 4 times per day for 30 days   Symbicort 160-4.5 mcg/actuation inhalation HFA aerosol inhaler  inhale 2 puffs by inhalation route 2 times  "per day in the morning and evening   Ventolin HFA 90 mcg/actuation inhalation HFA aerosol inhaler  inhale 1 puff (90 mcg) by inhalation route every 6 hours as needed         Allergy List  Allergen Name Date Reaction Notes   Keflex --  --  --          Family Medical History  Disease Name Relative/Age Notes   Alcohol Abuse / Father    Father/    Family History Of Breast Cancer / Sister    Sister/          Social History  Finding Status Start/Stop Quantity Notes   Alcohol Never --/-- --  never drinks alcohol   Exercises regularly --  --/-- --  0 times per week   Recreational Drug Use Never --/-- --  never used   Second hand smoke exposure Unknown --/-- --  no   Tobacco Former --/54 0.5 PPD smokes less than 1 pack per day, for 15 years, in the past used other tobacco products   Uses seatbelts --  --/-- --  yes         Review of Systems  · Constitutional  o Denies  o : fever  · HENT  o Admits  o : ear pain  o Denies  o : hearing loss or ringing, chronic sinus problem, swollen glands in neck  · Cardiovascular  o Denies  o : chest pain, palpitations (fast, fluttering, or skipping beats), swelling (feet, ankles, hands), shortness of breath while walking or lying flat  · Respiratory  o Admits  o : shortness of breath, additional respiratory symptoms except as noted in the HPI  · Gastrointestinal  o Denies  o : ulcers, nausea or vomiting  · Neurologic  o Denies  o : lightheaded or dizzy, stroke, headaches  · Musculoskeletal  o Denies  o : joint pain, back pain  · Endocrine  o Denies  o : thyroid disease, diabetes, heat or cold intolerance, excessive thirst or urination  · Psychiatric  o Admits  o : difficulty sleeping  o Denies  o : suicidal ideation, homicidal ideation  · Heme-Lymph  o Denies  o : bleeding or bruising tendency, anemia      Vitals  Date Time BP Position Site L\R Cuff Size HR RR TEMP(F) WT  HT  BMI kg/m2 BSA m2 O2 Sat HC       11/14/2018 12:56 /80 Sitting    108 - R  97 216lbs 5oz 5'  4\" 37.13 2.1 93 %   " "        Physical Examination  · Constitutional  o Appearance  o : well developed, well-nourished, in no acute distress  · Eyes  o Conjunctivae  o : conjunctivae normal no drainage  o Pupils and Irises  o : pupils equal, round, and reactive to light bilaterally  · Ears, Nose, Mouth and Throat  o Ears  o :   § External Ears  § : left sided auricular tenderness and the canal inside deeply red and tender and the TM is slightly cloudy   § Hearing  § : response to sound normal, no tinnitus  · Neck  o Inspection/Palpation  o : supple  o Thyroid  o : no thyromegaly  · Respiratory  o Respiratory Effort  o : breathing unlabored  o Auscultation of Lungs  o : clear to ascultation  · Cardiovascular  o Heart  o :   § Auscultation of Heart  § : regular rate and rhythm  · Musculoskeletal  o General  o :   § General Musculoskeletal  § : No joint swelling or deformity., Muscle tone, strength, and development grossly normal.  · Skin and Subcutaneous Tissue  o General Inspection  o : no lesions present, no areas of discoloration, skin turgor normal, texture normal  · Neurologic  o Mental Status Examination  o :   § Orientation  § : grossly oriented to person, place and time  o Gait and Station  o :   § Gait Screening  § : normal gait          Assessment  · Pulmonary fibrosis     515/J84.10  · Insomnia     780.52/G47.00  · Otitis externa     380.10/H60.90      Plan  · Orders  o ACO-39: Current medications updated and reviewed () - - 11/14/2018  · Medications  o BD Syringe 1 mL miscellaneous syringe   SIG: use as directed for 90 days use for 3 days weekly injections of the actimune   DISP: (1) 100 ct box with 3 refills  Prescribed on 11/14/2018     o BD Specialty Use Needles 30 gauge x 1/2\" miscellaneous needle   SIG: use as directed for 90 days use for the 3 days weekly inections of the actimune   DISP: (1) 100 ct box with 3 refills  Prescribed on 11/14/2018     o Floxin 0.3 % otic (ear) drops   SIG: instill 10 drops (1.5 mg) into " left ear by otic route 2 times per day for 7 days   DISP: (1) 5 ml drop btl with 0 refills  Prescribed on 11/14/2018     o amitriptyline 100 mg oral tablet   SIG: take 1 tablet (100 mg) by oral route once daily at bedtime for 90 days   DISP: (90) tablet with 1 refills  Adjusted on 11/14/2018     o ipratropium-albuterol 0.5 mg-3 mg(2.5 mg base)/3 mL inhalation solution for nebulization   SIG: use in nebulizer as directed 3 times a day for 30 days   DISP: (90) 3 ml vial with 11 refills  Adjusted on 11/14/2018     · Instructions  o Take all medications as prescribed/directed.  o Patient was educated/instructed on their diagnosis, treatment and medications prior to discharge from the clinic today.  o Patient instructed to seek medical attention urgently for new or worsening symptoms.  o Call the office with any concerns or questions.  o Chronic conditions reviewed and taken into consideration for today's treatment plan.  · Disposition  o Call or Return if symptoms worsen or persist.  o Return Visit Request in/on 6 months +/- 2 days ([id number]).            Electronically Signed by: KEVIN Severino -Author on November 14, 2018 01:22:33 PM

## 2021-05-07 NOTE — PROGRESS NOTES
"   Progress Note      Patient Name: Suzanne Gutierrez   Patient ID: [id number]   Sex: Female   YOB: 1950        Visit Date: April 30, 2019    Provider: Chelita Simpson MD   Location: [facility]   Location Address: [address] [place] [address]  [id number]   Location Phone: [phone]          Chief Complaint     F/U [facility] Friday 4/26/19. Needs refill.       History Of Present Illness  Suzanne Gutierrez is a 68 year old /White female who presents for evaluation and treatment of:      She had to go via the ambulance to [facility] for a sudden onset of vertigo with vomiting .  The EMS gave her Zofran in the ambulance.  She still doesn't feel totally normal.  She is a little off balance.  She has noticed that her breathing is better on the prednisone and asks if she can stay on it longer.  The L ear still hurts some.    She needs something for cold sores.  They are hard to get rid of.  If she has any problem coming off the meclizine I will call in more medication for her.  She needs amitriptylline .  It is what helps her sleep.       Past Medical History  Disease Name Date Onset Notes   Insomnia --  --    Pulmonary fibrosis --  --    Sleep apnea --  --          Past Surgical History  Procedure Name Date Notes   Cholecstectomy --  --    Hysterectomy --  --    Tonsillectomy --  --    Tubal ligation --  --          Medication List  Name Date Started Instructions   Actimmune 100 mcg/0.5 mL subcutaneous solution  inject 1 milliliter by subcutaneous route   amitriptyline 100 mg oral tablet 11/14/2018 take 1 tablet (100 mg) by oral route once daily at bedtime for 90 days   BD Insulin Syringe Ultra-Fine 0.5 mL 30 gauge x 1/2\" miscellaneous syringe 12/11/2018 USE THREE TIMES WEEKLY   BD Specialty Use Needles 30 gauge x 1/2\" miscellaneous needle 11/14/2018 use as directed for 90 days use for the 3 days weekly inections of the actimune   BD Syringe 1 mL " miscellaneous syringe 11/14/2018 use as directed for 90 days use for 3 days weekly injections of the actimune   ipratropium-albuterol 0.5 mg-3 mg(2.5 mg base)/3 mL inhalation solution for nebulization 11/14/2018 use in nebulizer as directed 3 times a day for 30 days   meclizine 25 mg oral tablet  take 1 tablet (25 mg) by oral route 3 times per day as needed   prednisone 20 mg oral tablet  take 20 mg/m2 by oral route once daily for 5 days   Symbicort 160-4.5 mcg/actuation inhalation HFA aerosol inhaler  inhale 2 puffs by inhalation route 2 times per day in the morning and evening   Ventolin HFA 90 mcg/actuation inhalation HFA aerosol inhaler  inhale 1 puff (90 mcg) by inhalation route every 6 hours as needed         Allergy List  Allergen Name Date Reaction Notes   Keflex --  --  --          Family Medical History  Disease Name Relative/Age Notes   Alcohol Abuse Father/   Father   Family History Of Breast Cancer Sister/   Sister         Social History  Finding Status Start/Stop Quantity Notes   Alcohol Never --/-- --  never drinks alcohol   Exercises regularly --  --/-- --  0 times per week   Recreational Drug Use Never --/-- --  never used   Second hand smoke exposure Unknown --/-- --  no   Tobacco Former --/54 0.5 PPD smokes less than 1 pack per day, for 15 years, in the past used other tobacco products   Uses seatbelts --  --/-- --  yes         Vitals  Date Time BP Position Site L\R Cuff Size HR RR TEMP (F) WT  HT  BMI kg/m2 BSA m2 O2 Sat        04/30/2019 02:34 /92 Sitting    102 - R  98.2 210lbs 2oz    94 %          Physical Examination  · Constitutional  o Appearance  o : well developed, well-nourished, in no acute distress  · Head and Face  o HEENT  o : TM's aren't red She is a little tender on the L ear There is a little bit of the cold sore remaining  · Eyes  o Conjunctivae  o : conjunctivae normal  · Neck  o Thyroid  o : no thyromegaly  · Respiratory  o Respiratory Effort  o : breathing  unlabored  o Auscultation of Lungs  o : fibrotic sounds are mild  · Cardiovascular  o Heart  o :   § Auscultation of Heart  § : regular rate and rhythm  o Peripheral Vascular System  o :   § Extremities  § : no edema  · Musculoskeletal  o General  o :   § General Musculoskeletal  § : grossly normal.  · Skin and Subcutaneous Tissue  o General Inspection  o : normal  · Neurologic  o Gait and Station  o :   § Gait Screening  § : normal gait  · Psychiatric  o Mood and Affect  o : mood normal, affect appropriate          Assessment  · Pulmonary fibrosis     515/J84.10  · Insomnia     780.52/G47.00  · Cold sore     054.9/B00.1      Plan  · Orders  o ACO-39: Current medications updated and reviewed () - - 04/30/2019  · Medications  o Zovirax 5 % topical cream   SIG: apply to the affected area(s) by topical route 5 times per day for 7 days   DISP: (1) 5 gm tube with 0 refills  Prescribed on 04/30/2019     o prednisone 10 mg oral tablet   SIG: take 1 tablet (10 mg) by oral route once daily in the morning for 20 days   DISP: (20) tablets with 0 refills  Prescribed on 04/30/2019     o amoxicillin 875 mg oral tablet   SIG: take 1 tablet (875 mg) by oral route every 12 hours for 7 days   DISP: (14) tablets with 0 refills  Prescribed on 04/30/2019     o amitriptyline 100 mg oral tablet   SIG: take 1 tablet (100 mg) by oral route once daily at bedtime for 90 days   DISP: (90) tablet with 1 refills  Adjusted on 04/30/2019     · Instructions  o Patient was educated/instructed on their diagnosis, treatment and medications prior to discharge from the clinic today.            Electronically Signed by: Chelita Simpson MD -Author on April 30, 2019 03:02:32 PM

## 2021-05-07 NOTE — PROGRESS NOTES
"   Progress Note      Patient Name: Suzanne Gutierrez   Patient ID: [id number]   Sex: Female   YOB: 1950        Visit Date: November 25, 2019    Provider: KEVIN Severino   Location: [facility]   Location Address: [address] [place] [address]   [place]  [address]   Location Phone: [phone]          Chief Complaint     refills and she started feeling bad this past weekend, with diarrhea, headache, nausea, and cramping       History Of Present Illness  Suzanne Gutierrez is a 69 year old /White female who presents for evaluation and treatment of:      pt here for the viral s/s --started Saturday am--diarrhea, stomach cramps and HAs ; and nausea no vomiting     no Hx of diverticulosis    pt was also exposed to RSV--and pt did see her pulmonologist and they are referring her to the hematologist/oncologist--for a \"blockage in the chest\" --pt with Hx of the pulmonary fibrosis    and refills on the insomnia med--doing well       Past Medical History  Disease Name Date Onset Notes   Insomnia --  --    Pulmonary fibrosis --  --    Screening Mammogram 10/11/18 --    Sleep apnea --  --          Past Surgical History  Procedure Name Date Notes   Cholecstectomy --  --    Hysterectomy --  --    Tonsillectomy --  --    Tubal ligation --  --          Medication List  Name Date Started Instructions   Actimmune 100 mcg/0.5 mL subcutaneous solution  inject 1 milliliter by subcutaneous route   amitriptyline 100 mg oral tablet 11/25/2019 take 1 tablet (100 mg) by oral route once daily at bedtime   BD Insulin Syringe Ultra-Fine 0.5 mL 30 gauge x 1/2\" miscellaneous syringe 12/11/2018 USE THREE TIMES WEEKLY   BD Specialty Use Needles 30 gauge x 1/2\" miscellaneous needle 11/14/2018 use as directed for 90 days use for the 3 days weekly inections of the actimune   BD Syringe 1 mL miscellaneous syringe 11/14/2018 use as directed for 90 days use for 3 days weekly injections of the actimune "   ipratropium-albuterol 0.5 mg-3 mg(2.5 mg base)/3 mL inhalation solution for nebulization 11/14/2018 use in nebulizer as directed 3 times a day for 30 days   Symbicort 160-4.5 mcg/actuation inhalation HFA aerosol inhaler  inhale 2 puffs by inhalation route 2 times per day in the morning and evening   Ventolin HFA 90 mcg/actuation inhalation HFA aerosol inhaler  inhale 1 puff (90 mcg) by inhalation route every 6 hours as needed         Allergy List  Allergen Name Date Reaction Notes   Keflex --  --  --        Allergies Reconciled  Family Medical History  Disease Name Relative/Age Notes   Alcohol abuse Father/   Father   Family History Of Breast Cancer Sister/   Sister         Social History  Finding Status Start/Stop Quantity Notes   Alcohol Never --/-- --  never drinks alcohol   Exercises regularly --  --/-- --  0 times per week   Recreational Drug Use Never --/-- --  never used   Second hand smoke exposure Unknown --/-- --  no   Tobacco Former --/54 0.5 PPD smokes less than 1 pack per day, for 15 years, in the past used other tobacco products   Uses seatbelts --  --/-- --  yes         Immunizations  NameDate Admin Mfg Trade Name Lot Number Route Inj VIS Given VIS Publication   [id number]/03/2019 NE Not Entered  NE NE 07/05/2019    Comments:    Gwsyqxck27/03/2019 NE Not Entered  NE NE 07/05/2019    Comments:          Review of Systems  · Constitutional  o Denies  o : fever  · HENT  o Denies  o : hearing loss or ringing, chronic sinus problem, swollen glands in neck  · Cardiovascular  o Denies  o : chest pain, palpitations (fast, fluttering, or skipping beats), swelling (feet, ankles, hands), shortness of breath while walking or lying flat  · Respiratory  o Admits  o : cough, abnormal sputum production, productive cough  · Gastrointestinal  o Admits  o : nausea, diarrhea, additional gastrointestinal symptoms except as noted in the HPI  o Denies  o : vomiting, abdominal pain, blood in stools, excessive flatulence,  "bloating  · Genitourinary  o Denies  o : dysuria  · Heme-Lymph  o Denies  o : bleeding or bruising tendency, anemia  · Allergic-Immunologic  o Denies  o : frequent illnesses      Vitals  Date Time BP Position Site L\R Cuff Size HR RR TEMP (F) WT  HT  BMI kg/m2 BSA m2 O2 Sat HC       11/25/2019 12:45 /74 Sitting    108 - R  97 204lbs 9oz 5'  4\" 35.11 2.05 94 %          Physical Examination  · Constitutional  o Appearance  o : well developed, well-nourished, in no acute distress  · Head and Face  o HEENT  o : Unremarkable  · Eyes  o Conjunctivae  o : conjunctivae normal  · Neck  o Inspection/Palpation  o : supple  o Thyroid  o : no thyromegaly  · Respiratory  o Respiratory Effort  o : breathing unlabored  o Auscultation of Lungs  o : clear to auscultation--few scattered BS throughout   · Cardiovascular  o Heart  o :   § Auscultation of Heart  § : regular rate and rhythm  o Peripheral Vascular System  o :   § Extremities  § : no edema  · Gastrointestinal  o Abdominal Examination  o :   § Abdomen  § : soft (+) BS (+) LLQ tenderness   · Lymphatic  o Neck  o : no lymphadenopathy present  · Musculoskeletal  o General  o :   § General Musculoskeletal  § : No joint swelling or deformity. Muscle tone, strength, and development grossly normal.  · Skin and Subcutaneous Tissue  o General Inspection  o : skin turgor normal, texture normal  · Neurologic  o Gait and Station  o :   § Gait Screening  § : normal gait  · Psychiatric  o Mood and Affect  o : mood normal, affect appropriate          Assessment  · Diarrhea     787.91/R19.7  · Insomnia, unspecified     780.52/G47.00  · Pulmonary fibrosis     515/J84.10  · RSV exposure     V01.79/Z20.828  · Nausea     787.02/R11.0  · LLQ cramping     789.04/R10.32  · LLQ abdominal tenderness     789.64/R10.814  · Sputum production     786.2/R05  · Lower respiratory infection     519.8/J22    Problems Reconciled  Plan  · Orders  o CBC with Auto Diff [facility] ([id number]) - 515/J84.10, " V01.79/Z20.828, 787.02/R11.0, 787.91/R19.7 - 11/25/2019  o IOP - Urinalysis without Microscopy (Clinitek) [facility] ([id number]) - 787.02/R11.0, 787.91/R19.7, 789.04/R10.32, 789.64/R10.814 - 11/25/2019   GLU-NEG PARMINDER-SMALL KET-TRACE SG-1.02 BLO-NEG PH-7.0 PRO-30 URO-1.0 NIT-NEG MATEUS-NEG  o ACO-39: Current medications updated and reviewed () - - 11/25/2019  o CT Abdomen and Pelvis without IV Contrast [facility]; suggest oral prep (allergic--drink Readicat; not allegic but with renal failure--drink Omnipaque) ([id number]) - 787.02/R11.0, 787.91/R19.7, 789.04/R10.32, 789.64/R10.814 - 11/25/2019   s/s started Saturday   · Medications  o Zofran 4 mg oral tablet   SIG: take 1 tablet by oral route 3 times a day as needed for 7 days   DISP: (21) tablets with 0 refills  Prescribed on 11/25/2019     o Augmentin 875-125 mg oral tablet   SIG: take 1 tablet by oral route every 12 hours for 10 days   DISP: (20) tablets with 0 refills  Prescribed on 11/25/2019     o Probiotic 20 billion cell oral capsule   SIG: take 1 capsule by oral route 2 times a day for 10 days   DISP: (20) capsules with 0 refills  Prescribed on 11/25/2019     o amitriptyline 100 mg oral tablet   SIG: take 1 tablet (100 mg) by oral route once daily at bedtime   DISP: (30) tablet with 5 refills  Adjusted on 11/25/2019     o Medications have been Reconciled  o Transition of Care or Provider Policy  · Instructions  o BRAT diet, Avoid dairy products.  o Take all medications as prescribed/directed.  o Patient was educated/instructed on their diagnosis, treatment and medications prior to discharge from the clinic today.  o Patient instructed to seek medical attention urgently for new or worsening symptoms.  o Call the office with any concerns or questions.  o Risks, benefits, and alternatives were discussed with the patient. The patient is aware of risks associated with: Augmentin--but trying to use this for the suspected diverticulitis and the lower respi infection   o Chronic  conditions reviewed and taken into consideration for today's treatment plan.  · Disposition  o Call or Return if symptoms worsen or persist.            Electronically Signed by: KEVIN Severino -Author on November 25, 2019 02:19:08 PM

## 2021-05-07 NOTE — PROGRESS NOTES
Progress Note      Patient Name: Suzanne Gutierrez   Patient ID: [id number]   Sex: Female   YOB: 1950        Visit Date: January 22, 2018    Provider: ALBAN Pham   Location: [facility] [place]   Location Address: [address] [place] [address]  [id number]   Location Phone: [phone]          Chief Complaint     cough, pain in upper middle back, headache, sore throat, and body aches, x 2 days       History Of Present Illness  Suzanne Gutierrez is a 67 year old /White female who presents for evaluation and treatment of:      Respiratory symptoms for one week.    She reports a history of pulmonary fibrosis. She is compliant with her inhalers. Her symptoms last week changed. Her his back is hurting, more on the right side. She is having more difficulty breathing-cough is productive with green sputum. She feels more short of breath. No fever. No significant head congestion, sinus drainage, or sore throat.    She has a history of pneumonia-several times  she has been taking Mucinex, and Aleve- symptoms have not improved or worsened       Past Medical History  Disease Name Date Onset Notes   Insomnia --  --    Pulmonary fibrosis --  --    Sleep apnea --  --          Past Surgical History  Procedure Name Date Notes   Cholecstectomy --  --    Hysterectomy --  --    Tonsillectomy --  --    Tubal ligation --  --          Medication List  Name Date Started Instructions   Actimmune 100 mcg/0.5 mL subcutaneous solution  inject 1 milliliter by subcutaneous route   amitriptyline 100 mg oral tablet  take 1 tablet (100 mg) by oral route once daily at bedtime   Symbicort 160-4.5 mcg/actuation inhalation HFA aerosol inhaler  inhale 2 puffs by inhalation route 2 times per day in the morning and evening   Ventolin HFA 90 mcg/actuation inhalation HFA aerosol inhaler  inhale 1 puff (90 mcg) by inhalation route every 6 hours as needed         Allergy List  Allergen Name Date Reaction  Notes   Keflex --  --  --          Family Medical History  Disease Name Relative/Age Notes   Alcohol Abuse / Father    Father/    Family History Of Breast Cancer / Sister    Sister/          Social History  Finding Status Start/Stop Quantity Notes   Alcohol Never --/-- --  never drinks alcohol   Exercises regularly --  --/-- --  0 times per week   Recreational Drug Use Never --/-- --  never used   Second hand smoke exposure Unknown --/-- --  no   Tobacco Former --/54 0.5 PPD smokes less than 1 pack per day, for 15 years, in the past used other tobacco products   Uses seatbelts --  --/-- --  yes         Review of Systems  · Constitutional  o Admits  o : fatigue, body aches  o Denies  o : fever, night sweats  · HENT  o Admits  o : nasal congestion  o Denies  o : sore throat  · Cardiovascular  o Denies  o : chest pain, palpitations (fast, fluttering, or skipping beats), swelling (feet, ankles, hands)  · Respiratory  o * See HPI  · Gastrointestinal  o Denies  o : ulcers, nausea or vomiting      Vitals  Date Time BP Position Site L\R Cuff Size HR RR TEMP(F) WT  HT  BMI kg/m2 BSA m2 O2 Sat HC       01/22/2018 02:30 /82 Sitting    100 - R  98.2 211lbs 8oz    96 %           Physical Examination  · Constitutional  o Appearance  o : well developed, well-nourished, in no acute distress  · Eyes  o Conjunctivae  o : conjunctivae normal no drainage  o Pupils and Irises  o : pupils equal, round, and reactive to light bilaterally  · Neck  o Inspection/Palpation  o : supple  · Respiratory  o Respiratory Effort  o : breathing unlabored  o Auscultation of Lungs  o : Crackles bilaterally  · Cardiovascular  o Heart  o :   § Auscultation of Heart  § : regular rate and rhythm  · Skin and Subcutaneous Tissue  o General Inspection  o : no lesions present, no areas of discoloration, skin turgor normal, texture normal  · Neurologic  o Mental Status Examination  o :   § Orientation  § : grossly oriented to person, place and time  o Gait  and Station  o :   § Gait Screening  § : normal gait          Assessment  · Cough     786.2/R05  · Pulmonary fibrosis     515/J84.10  · Lung crackles     786.7/R09.89      Plan  · Orders  o Chest xray 2 views [facility] ([id number]) - 786.2/R05 - 01/22/2018  o ACO-39: Current medications updated and reviewed () - - 01/22/2018  · Medications  o azithromycin 250 mg oral tablet   SIG: take 2 tablets (500 mg) by oral route once daily for 1 day then 1 tablet (250 mg) by oral route once daily for 4 days   DISP: (6) tablets with 0 refills  Prescribed on 01/22/2018     · Instructions  o Take all medications as prescribed/directed.  o Rest. Increase Fluids.  o Patient was educated/instructed on their diagnosis, treatment and medications prior to discharge from the clinic today.  o Chronic conditions reviewed and taken into consideration for today's treatment plan.  o Patient has been instructed to contact her pulmonologist if symptoms do not improve            Electronically Signed by: ALBAN Pham -Author on January 22, 2018 03:42:36 PM

## 2021-05-07 NOTE — PROGRESS NOTES
"   Progress Note      Patient Name: Suzanne Gutierrez   Patient ID: [id number]   Sex: Female   YOB: 1950        Visit Date: August 21, 2019    Provider: Chelita Simpson MD   Location: [facility]   Location Address: [address] [place] [address]   [place]  [address]   Location Phone: [phone]          Chief Complaint     has had trouble breathing for 4 days, getting worse, inhaler and neb treatments not helping       History Of Present Illness  Suzanne Gutierrez is a 69 year old /White female who presents for evaluation and treatment of:      She has been coughing for 4 days .  She is SOA.  She knows that she needs some steroids.  The cough is non-productive.  She has increased her nebulizer treatments up to TID .  She uses Symbicort regularly.  No fever.  She will watch for a change in the color of phlegm.  The last time I gave her Prednisone 10 mg for 20 days and she did well.  She sees her pulmonologist about that time.       Past Medical History  Disease Name Date Onset Notes   Insomnia --  --    Pulmonary fibrosis --  --    Sleep apnea --  --          Past Surgical History  Procedure Name Date Notes   Cholecstectomy --  --    Hysterectomy --  --    Tonsillectomy --  --    Tubal ligation --  --          Medication List  Name Date Started Instructions   Actimmune 100 mcg/0.5 mL subcutaneous solution  inject 1 milliliter by subcutaneous route   amitriptyline 100 mg oral tablet 04/30/2019 take 1 tablet (100 mg) by oral route once daily at bedtime for 90 days   BD Insulin Syringe Ultra-Fine 0.5 mL 30 gauge x 1/2\" miscellaneous syringe 12/11/2018 USE THREE TIMES WEEKLY   BD Specialty Use Needles 30 gauge x 1/2\" miscellaneous needle 11/14/2018 use as directed for 90 days use for the 3 days weekly inections of the actimune   BD Syringe 1 mL miscellaneous syringe 11/14/2018 use as directed for 90 days use for 3 days weekly injections of the actimune   ipratropium-albuterol 0.5 mg-3 " mg(2.5 mg base)/3 mL inhalation solution for nebulization 11/14/2018 use in nebulizer as directed 3 times a day for 30 days   meclizine 25 mg oral tablet 05/22/2019 take 1 tablet (25 mg) by oral route 3 times per day as needed for 20 days   prednisone 20 mg oral tablet  take 20 mg/m2 by oral route once daily for 5 days   Symbicort 160-4.5 mcg/actuation inhalation HFA aerosol inhaler  inhale 2 puffs by inhalation route 2 times per day in the morning and evening   Ventolin HFA 90 mcg/actuation inhalation HFA aerosol inhaler  inhale 1 puff (90 mcg) by inhalation route every 6 hours as needed   Zovirax 5 % topical cream 04/30/2019 apply to the affected area(s) by topical route 5 times per day for 7 days         Allergy List  Allergen Name Date Reaction Notes   Keflex --  --  --          Family Medical History  Disease Name Relative/Age Notes   Alcohol Abuse Father/   Father   Family History Of Breast Cancer Sister/   Sister         Social History  Finding Status Start/Stop Quantity Notes   Alcohol Never --/-- --  never drinks alcohol   Exercises regularly --  --/-- --  0 times per week   Recreational Drug Use Never --/-- --  never used   Second hand smoke exposure Unknown --/-- --  no   Tobacco Former --/54 0.5 PPD smokes less than 1 pack per day, for 15 years, in the past used other tobacco products   Uses seatbelts --  --/-- --  yes         Immunizations  NameDate Admin Mfg Trade Name Lot Number Route Inj VIS Given VIS Publication   [id number]/03/2019 NE Not Entered  NE NE 07/05/2019    Comments:    [id number]/03/2019 NE Not Entered  NE NE 07/05/2019    Comments:          Vitals  Date Time BP Position Site L\R Cuff Size HR RR TEMP (F) WT  HT  BMI kg/m2 BSA m2 O2 Sat HC       08/21/2019 03:55 /82 Sitting    94 - R  97.1 209lbs 0oz    92 %          Physical Examination  · Constitutional  o Appearance  o : well developed, well-nourished, in no acute distress  · Eyes  o Conjunctivae  o : conjunctivae  normal  · Neck  o Inspection/Palpation  o : supple  o Thyroid  o : no thyromegaly  · Respiratory  o Respiratory Effort  o : breathing unlabored  o Auscultation of Lungs  o : clear to ascultation  · Cardiovascular  o Heart  o :   § Auscultation of Heart  § : regular rate and rhythm  o Peripheral Vascular System  o :   § Extremities  § : no edema  · Lymphatic  o Neck  o : no lymphadenopathy present  · Musculoskeletal  o General  o :   § General Musculoskeletal  § : No joint swelling or deformity., Muscle tone, strength, and development grossly normal.  · Skin and Subcutaneous Tissue  o General Inspection  o : no lesions present, no areas of discoloration, skin turgor normal, texture normal  · Neurologic  o Gait and Station  o :   § Gait Screening  § : normal gait  · Psychiatric  o Mood and Affect  o : mood normal, affect appropriate          Assessment  · COPD (chronic obstructive pulmonary disease)     496/J44.9      Plan  · Orders  o ACO-39: Current medications updated and reviewed () - - 08/21/2019  · Medications  o prednisone 10 mg oral tablet   SIG: take 1 tablet (10 mg) by oral route once daily in the morning for 20 days   DISP: (20) tablets with 0 refills  Prescribed on 08/21/2019     · Instructions  o Patient was educated/instructed on their diagnosis, treatment and medications prior to discharge from the clinic today.            Electronically Signed by: Chelita Simpson MD -Author on August 21, 2019 04:19:48 PM

## 2021-05-07 NOTE — PROGRESS NOTES
"   Progress Note      Patient Name: Suzanne Gutierrez   Patient ID: [id number]   Sex: Female   YOB: 1950        Visit Date: September 18, 2019    Provider: Yoli Ku DO   Location: [facility]   Location Address: [address] [place] [address] [address] [place]  [address]   Location Phone: [phone]          Chief Complaint     DM check. Supply refills.       History Of Present Illness  Suzanne Gutierrez is a 69 year old /White female who presents for evaluation and treatment of:      A.) Neuropathy : The patient presents for a 'check of diabetic labs.' She presents with complaints of neuropathy described as numbness and tingling of her b/l lower extremities. She admits to 3 years of sxs. She notes an evaluation by neurology, where she was diagnosed with severe nerve damage. She notes that she is here today for 'diabetic labs testing', as she is scheduled to be evaluated by podiatry on 9/20/19 with testing for diabetes per the request of podiatry.       Past Medical History  Disease Name Date Onset Notes   Insomnia --  --    Pulmonary fibrosis --  --    Screening Mammogram 10/11/18 --    Sleep apnea --  --          Past Surgical History  Procedure Name Date Notes   Cholecstectomy --  --    Hysterectomy --  --    Tonsillectomy --  --    Tubal ligation --  --          Medication List  Name Date Started Instructions   Actimmune 100 mcg/0.5 mL subcutaneous solution  inject 1 milliliter by subcutaneous route   amitriptyline 100 mg oral tablet 04/30/2019 take 1 tablet (100 mg) by oral route once daily at bedtime for 90 days   BD Insulin Syringe Ultra-Fine 0.5 mL 30 gauge x 1/2\" miscellaneous syringe 12/11/2018 USE THREE TIMES WEEKLY   BD Specialty Use Needles 30 gauge x 1/2\" miscellaneous needle 11/14/2018 use as directed for 90 days use for the 3 days weekly inections of the actimune   BD Syringe 1 mL miscellaneous syringe 11/14/2018 use as directed for 90 days use for 3 days weekly injections " [facility]   ipratropium-albuterol 0.5 mg-3 mg(2.5 mg base)/3 mL inhalation solution for nebulization 11/14/2018 use in nebulizer as directed 3 times a day for 30 days   meclizine 25 mg oral tablet 05/22/2019 take 1 tablet (25 mg) by oral route 3 times per day as needed for 20 days   Symbicort 160-4.5 mcg/actuation inhalation HFA aerosol inhaler  inhale 2 puffs by inhalation route 2 times per day in the morning and evening   Ventolin HFA 90 mcg/actuation inhalation HFA aerosol inhaler  inhale 1 puff (90 mcg) by inhalation route every 6 hours as needed         Allergy List  Allergen Name Date Reaction Notes   Keflex --  --  --        Allergies Reconciled  Family Medical History  Disease Name Relative/Age Notes   Alcohol Abuse Father/   Father   Family History Of Breast Cancer Sister/   Sister         Social History  Finding Status Start/Stop Quantity Notes   Alcohol Never --/-- --  never drinks alcohol   Exercises regularly --  --/-- --  0 times per week   Recreational Drug Use Never --/-- --  never used   Second hand smoke exposure Unknown --/-- --  no   Tobacco Former --/54 0.5 PPD smokes less than 1 pack per day, for 15 years, in the past used other tobacco products   Uses seatbelts --  --/-- --  yes         Immunizations  NameDate Admin Mfg Trade Name Lot Number Route Inj VIS Given VIS Publication   [id number]/03/2019 NE Not Entered  NE NE 07/05/2019    Comments:    Ossxixwi76/03/2019 NE Not Entered  NE NE 07/05/2019    Comments:          Review of Systems  · Constitutional  o Denies  o : fatigue, night sweats  · Eyes  o Denies  o : double vision, blurred vision  · HENT  o Denies  o : vertigo, recent head injury  · Breasts  o Denies  o : abnormal changes in breast size, additional breast symptoms except as noted in the HPI  · Cardiovascular  o Denies  o : chest pain, irregular heart beats  · Respiratory  o Denies  o : shortness of breath, productive cough  · Gastrointestinal  o Denies  o : nausea,  "vomiting  · Genitourinary  o Denies  o : dysuria, urinary retention  · Integument  o Denies  o : hair growth change, new skin lesions  · Neurologic  o Admits  o : numbness and tingling of bilateral feet, intermittent sharp pains of feet   o Denies  o : altered mental status, seizures  · Musculoskeletal  o Denies  o : joint swelling, limitation of motion  · Endocrine  o Denies  o : cold intolerance, heat intolerance  · Heme-Lymph  o Denies  o : petechiae, lymph node enlargement or tenderness  · Allergic-Immunologic  o Denies  o : frequent illnesses      Vitals  Date Time BP Position Site L\R Cuff Size HR RR TEMP (F) WT  HT  BMI kg/m2 BSA m2 O2 Sat HC       09/18/2019 09:39 /86 Sitting    95 - R  97.4 211lbs 1oz 5'  4\" 36.23 2.08 98 %          Physical Examination  · Constitutional  o Appearance  o : well-nourished, obese  · Eyes  o Conjunctivae  o : conjunctivae normal  o Pupils and Irises  o : pupils equal and round  · Neck  o Inspection/Palpation  o : supple  · Respiratory  o Respiratory Effort  o : breathing unlabored  o Auscultation of Lungs  o : clear to ascultation  · Cardiovascular  o Heart  o :   § Auscultation of Heart  § : regular rate and rhythm  o Peripheral Vascular System  o :   § Extremities  § : no edema  · Lymphatic  o Neck  o : no lymphadenopathy present  · Musculoskeletal  o General  o :   § General Musculoskeletal  § : No joint swelling or deformity.  · Skin and Subcutaneous Tissue  o General Inspection  o : no rashes present  · Neurologic  o Gait and Station  o :   § Gait Screening  § : normal gait  · Psychiatric  o Mood and Affect  o : mood normal, affect appropriate              Assessment  · Neuropathy     355.9/G62.9    A.) Labs ordered as noted below, will patient will results; will also fax to podiatry. Follow up per regularly scheduled PCP visits.         Plan  · Orders  o CMP [facility] (97615) - 355.9/G62.9 - 09/18/2019  o Hgb A1c [facility] ([id number]) - 355.9/G62.9 - 09/18/2019  o ACO-39: Current " medications updated and reviewed () - - 09/18/2019  · Medications  o Medications have been Reconciled  o Transition of Care or Provider Policy  · Instructions  o Patient was educated/instructed on their diagnosis, treatment and medications prior to discharge from the clinic today.  o Follow up per regularly scheduled PCP visits.             Electronically Signed by: Yoli Ku DO - on September 18, 2019 10:37:36 AM

## 2021-05-07 NOTE — PROGRESS NOTES
"   Progress Note      Patient Name: Suzanne Gutierrez   Patient ID: [id number]   Sex: Female   YOB: 1950        Visit Date: March 4, 2021    Provider: ALBAN Severino   Location: [facility]   Location Address: [address] [place] [address] [address] [place] [place]  [id number]   Location Phone: [phone]          Chief Complaint  · Follow up office visit within 14 calender days of discharge from inpatient status (moderate complexity).      History Of Present Illness  FOLLOW UP OFFICE VISIT WITHIN 14 CALENDER DAYS OF INPATIENT STATUS (MODERATE COMPLEXITY)  Suzanne Gutierrez presents to office for follow up post discharge from inpatient status within 14 calender days. Patient was contacted within 2 business days via phone conversation. Documentation of that phone contact is present in the patient's electronic chart. Patient was admitted to inpatient facility on 02/04/2021 and discharged 02/17/2021 due to: bilateral pneumonia (CAP-MRSA (+)) then also COVID pneumonia -and hypoxemia and mild anemia and oral thrush.   Admitting MD: DR Adrienne Martin   Her discharge summary has been reviewed and placed in the patient's electronic chart.   Patient's problem list is: Pulmonary fibrosis and Sleep apnea   Patient's medication list has been updated/reconcilled from discharge summary. Medication list is: Actimmune 100 mcg/0.5 mL subcutaneous solution, albuterol sulfate 90 mcg/actuation inhalation HFA aerosol inhaler, amitriptyline 100 mg oral tablet, aspirin 81 mg oral tablet,chewable, BD Insulin Syringe Ultra-Fine 0.5 mL 30 gauge x 1/2\" miscellaneous syringe, BD Specialty Use Needles 30 gauge x 1/2\" miscellaneous needle, BD Syringe 1 mL miscellaneous syringe, cholecalciferol (vitamin D3) 125 mcg (5,000 unit) oral capsule, famotidine 20 mg oral tablet, fluticasone propionate 50 mcg/actuation nasal spray,suspension, hydrochlorothiazide 12.5 mg oral tablet, ipratropium-albuterol 0.5 mg-3 mg(2.5 mg base)/3 mL " "inhalation solution for nebulization, levetiracetam 500 mg oral tablet, meclizine 25 mg oral tablet, Prevnar 13 (PF) 0.5 mL intramuscular syringe, Replaced/Retired Drug 90 mcg/Actuation inhalation aerosol, vancomycin in 0.9 % sodium chl intravenous, and Ventolin HFA 90 mcg/actuation inhalation HFA aerosol inhaler   Suzanne Gutierrez is a 70 year old /White female who presents for evaluation and treatment of:      pt sees her pulmonologist the 16th this month for f/U --DR Mcclellan --    the anemia was a new thing--last labs in Nov were normal     pt still very fatigued-and positionally  dizziness and light headedness     now om continuous O2 at 3L/NC all the time  pt and daughter reports actually began with s/s on 1/30/2021 --was sleeping all the time and then was so weak kept falling--and daughter made her go--they called 911       Past Medical History  Disease Name Date Onset Notes   Insomnia --  --    Pre-diabetes --  --    Pulmonary fibrosis --  --    Screening Mammogram 10/11/18 --    Sleep apnea --  --    Vertigo --  --          Past Surgical History  Procedure Name Date Notes   Cholecstectomy --  --    Hysterectomy --  --    Tonsillectomy --  --    Tubal ligation --  --          Medication List  Name Date Started Instructions   Actimmune 100 mcg/0.5 mL subcutaneous solution  inject 1 milliliter by subcutaneous route   albuterol sulfate 90 mcg/actuation inhalation HFA aerosol inhaler  --    Replaced/Retired Drug 90 mcg/Actuation inhalation aerosol 12/15/2020 INHALE 1 PUFF BY INHALATION ROUTE EVERY 6 HOURS AS NEEDED FOR 30 DAYS   amitriptyline 100 mg oral tablet 02/16/2021 TAKE 1 TABLET BY MOUTH EVERY NIGHT AT BEDTIME   aspirin 81 mg oral tablet,chewable  chew 1 tablet (81 mg) by oral route once daily   BD Insulin Syringe Ultra-Fine 0.5 mL 30 gauge x 1/2\" miscellaneous syringe 12/11/2018 USE THREE TIMES WEEKLY   BD Specialty Use Needles 30 gauge x 1/2\" miscellaneous needle 11/14/2018 use as directed for 90 " days use for the 3 days weekly inections of the actimune   BD Syringe 1 mL miscellaneous syringe 11/14/2018 use as directed for 90 days use for 3 days weekly injections of the actimune   cholecalciferol (vitamin D3) 125 mcg (5,000 unit) oral capsule  take 1 capsule by oral route daily   famotidine 20 mg oral tablet  take 1 tablet (20 mg) by oral route 2 times per day   fluticasone propionate 50 mcg/actuation nasal spray,suspension  spray 1 spray (50 mcg) in each nostril by intranasal route once daily   hydrochlorothiazide 12.5 mg oral tablet 08/26/2020 take 1 tablet by oral route daily as needed for 30 days for pedal edema   ipratropium-albuterol 0.5 mg-3 mg(2.5 mg base)/3 mL inhalation solution for nebulization 08/26/2020 use in nebulizer as directed 3 times a day for 30 days   levetiracetam 500 mg oral tablet  take 1 tablet (500 mg) by oral route 2 times per day   meclizine 25 mg oral tablet 02/20/2021 TAKE 1 TABLET BY MOUTH THREE TIMES DAILY AS NEEDED FOR VERTIGO   Prevnar 13 (PF) 0.5 mL intramuscular syringe 11/24/2020 inject 0.5 milliliter by intramuscular route once for 1 day   vancomycin in 0.9 % sodium chl intravenous 02/17/2021 Intravenous Infusion X 3 days; to end 02/20/2021   Ventolin HFA 90 mcg/actuation inhalation HFA aerosol inhaler  inhale 1 puff (90 mcg) by inhalation route every 4-6 hours as needed         Allergy List  Allergen Name Date Reaction Notes   Keflex --  --  --        Allergies Reconciled  Family Medical History  Disease Name Relative/Age Notes   Alcohol abuse Father/   Father   Family History Of Breast Cancer Sister/   Sister         Social History  Finding Status Start/Stop Quantity Notes   Alcohol Never --/-- --  never drinks alcohol   Exercises regularly --  --/-- --  0 times per week   Recreational Drug Use Never --/-- --  never used   Second hand smoke exposure Unknown --/-- --  no   Tobacco Former --/54 0.5 PPD smokes less than 1 pack per day, for 15 years, in the past used other  "tobacco products   Uses seatbelts --  --/-- --  yes         Immunizations  NameDate Admin Mfg Trade Name Lot Number Route Inj VIS Given VIS Publication   [id number]/03/2019 NE Not Entered  NE NE 07/05/2019    Comments:    Legoljsn89/03/2019 NE Not Entered  NE NE 07/05/2019    Comments:          Review of Systems  · Constitutional  o Admits  o : fatigue  o Denies  o : fever, weight gain, weight loss  · HENT  o Admits  o : vertigo, lightheadedness  o Denies  o : recent head injury  · Cardiovascular  o Admits  o : dyspnea on exertion, lower extremity edema  o Denies  o : chest pain, irregular heart beats  · Respiratory  o Admits  o : shortness of breath, cough  o Denies  o : hemoptysis, wheezing, productive cough  · Gastrointestinal  o Denies  o : nausea, vomiting, diarrhea, constipation, abdominal pain  · Genitourinary  o Denies  o : dysuria  · Integument  o Denies  o : new skin lesions  · Neurologic  o Admits  o : unsteady gait, weakness, dizziness  o Denies  o : H/A  · Musculoskeletal  o Denies  o : joint swelling, limitation of motion  · Endocrine  o Denies  o : cold intolerance, heat intolerance  · Heme-Lymph  o Denies  o : petechiae, lymph node enlargement or tenderness  · Allergic-Immunologic  o Denies  o : frequent illnesses      Vitals  Date Time BP Position Site L\R Cuff Size HR RR TEMP (F) WT  HT  BMI kg/m2 BSA m2 O2 Sat FR L/min FiO2 HC       03/04/2021 03:37 /72 Sitting    79 - R  97.3 210lbs 0oz 5'  3.5\" 36.62 2.07 96 %          03/04/2021 03:41 /74 Supine    88 - R   03/04/2021 03:41 /70 Sitting    92 - R   03/04/2021 03:41 /62 Standing    96 - R             Physical Examination  · Constitutional  o Appearance  o : well developed, well-nourished, in no acute distress  · Head and Face  o HEENT  o : Unremarkable--wearing mask  · Eyes  o Conjunctivae  o : conjunctivae normal  · Neck  o Inspection/Palpation  o : supple  o Thyroid  o : no thyromegaly  · Respiratory  o Respiratory " Effort  o : breathing unlabored  o Auscultation of Lungs  o : clear to auscultation--wearing her O2/NC at 3L  · Cardiovascular  o Heart  o :   § Auscultation of Heart  § : regular rate and rhythm  o Peripheral Vascular System  o :   § Extremities  § : BLE at the ankles and feet 1-2+ edema  · Gastrointestinal  o Abdominal Examination  o :   § Abdomen  § : soft  · Lymphatic  o Neck  o : no lymphadenopathy present  · Musculoskeletal  o General  o :   § General Musculoskeletal  § : No joint swelling or deformity., Muscle tone, strength, and development grossly normal.  · Skin and Subcutaneous Tissue  o General Inspection  o : skin turgor normal, texture normal--(+) Bilat inner AC and Fa bruising   · Neurologic  o Gait and Station  o :   § Gait Screening  § : normal gait  · Psychiatric  o Mood and Affect  o : mood normal, affect appropriate          Assessment  · Anemia     285.9/D64.9  · Fatigue     780.79/R53.83  · Pneumonia     486/J18.9  · Follow up     V67.9/Z09  · Hospitalization within last 30 days     V15.89/Z92.89  · History of COVID-19     V12.09/Z86.16  · Oxygen dependent     V46.2/Z99.81  · Edema     782.3/R60.9  · Pulmonary fibrosis     515/J84.10      Plan  · Orders  o B12 Folate levels (B12FO) - 285.9/D64.9 - 03/04/2021  o CBC with Auto Diff [facility] (43339) - 285.9/D64.9 - 03/04/2021  o Iron panel (iron, TIBC, transferrin saturation) (86071, 49340, 56776) - 285.9/D64.9 - 03/04/2021  o Discharge medications reconciled with the current medication list (1111F) - - 03/04/2021  o CMP [facility] (79373) - 486/J18.9, V15.89/Z92.89, 782.3/R60.9 - 03/04/2021  o ACO-39: Current medications updated and reviewed (1159F, ) - - 03/04/2021  o Ferritin ser/plas (82503) - 285.9/D64.9, V67.9/Z09, V15.89/Z92.89 - 03/04/2021  · Medications  o T.E.D. Knee Length-M-Long miscellaneous misc   SIG: use as directed for 90 days wear during the daytime   DISP: (1) Package with 0 refills  Prescribed on 03/04/2021     o Medications have been  Reconciled  o Transition of Care or Provider Policy  · Instructions  o Patient discharge summary has been reviewed and placed in patient's electronic medical record.  o Patient received a phone call from my office within 2 business days of discharge from inpatient status, and documented within the patient's chart.  o Also patient was seen (face to face) for follow up evaluation within 14 calender days of discharge from inpatient status for a severe complexity issue.  o Patient was educated on their diagnosis, treatment and any medication changes while being evaluated today.  o Rest. Increase Fluids. and prop up feet at all times when sitting   o Patient was educated/instructed on their diagnosis, treatment and medications prior to discharge from the clinic today.  o Patient instructed to seek medical attention urgently for new or worsening symptoms.  o Call the office with any concerns or questions.  o Chronic conditions reviewed and taken into consideration for today's treatment plan.  · Disposition  o Call or Return if symptoms worsen or persist.            Electronically Signed by: ALBAN Severino -Author on March 4, 2021 04:08:53 PM

## 2021-05-07 NOTE — PROGRESS NOTES
Progress Note      Patient Name: Suzanne Gutierrez   Patient ID: [id number]   Sex: Female   YOB: 1950        Visit Date: November 24, 2020    Provider: ALBAN Severino   [place] [facility] [place]   [place] Address: [address] [place] [address]   [place]  [id number]   Location Phone: [phone]          Chief Complaint  · Annual Wellness Exam      History Of Present Illness  The patient is a 70 year old /White female who has come to this office for her Annual Wellness Visit.   Her Primary Care Provider is Shelley PHOENIX. Her comprehensive Care Team list, including suppliers, has been updated on the Facesheet. Her medical/family history, height, weight, BMI, and blood pressure have been reviewed and are in the chart. The Health Risk Assessment has been completed and scanned in the chart.   Medications are listed in the medication list.   The active problem list includes: Insomnia, Pulmonary fibrosis, and Sleep apnea   The patient does not have a history of substance use.   Patient reports her diet is adequate.   The Mini-Cog has been administered and is scanned in chart. The results are negative. Her cognitive function is without limitation.   A hearing loss screen was completed today and the result is negative.   Patient does not have any risk factors for depression. Patient completed the PHQ-9 today and it has been scanned in the chart. The total score is 1-4.   The Timed Up and Go screen was administered today and the result is negative.   The Jaramillo Index of [place] in ADLs indicated full function (score of 6).   A Falls Risk Assessment has been completed, including a review of home fall hazards and medication review.   Overall, the patient's functional ability is noted by this provider to be within normal limits. Her level of safety is noted to be within normal limits. Her balance/gait is within normal limits. There have been no falls in the past year.  "Patient-specific home safety recommendations have been reviewed and a copy has been given to patient.   She denies issues with leaking urine.   There are no additional risk factors identified.   Living Will/Advanced Directive has not previously been completed.   Personalized health advice was given to the patient and a written health screening schedule was established; see Plan for details.   Suzanne Gutierrez is a 70 year old /White female who presents for evaluation and treatment of:      pt seeing Yasmeen PHOENIX--neurology and pt doing well and they decided that the episode she had experienced and was in hospital and out of it for a few days--was a SZ and then they did the EEG--no SZ now--and due for F/AGUILAR at the 3 month ashvin--pt reports no further issues    pt experiencing some sinus issues and no fever no cough no congestion no loss of taste or smell and no N/V/D    pt reports other than the sinus issues/pressure and like an infection s/s--started last week --ready to cook for the family       Past Medical History  Disease Name Date Onset Notes   Insomnia --  --    Pre-diabetes --  --    Pulmonary fibrosis --  --    Screening Mammogram 10/11/18 --    Sleep apnea --  --    Vertigo --  --          Past Surgical History  Procedure Name Date Notes   Cholecstectomy --  --    Hysterectomy --  --    Tonsillectomy --  --    Tubal ligation --  --          Medication List  Name Date Started Instructions   Actimmune 100 mcg/0.5 mL subcutaneous solution  inject 1 milliliter by subcutaneous route   albuterol sulfate 90 mcg/actuation inhalation HFA aerosol inhaler  --    amitriptyline 100 mg oral tablet 08/26/2020 take 1 tablet (100 mg) by oral route once daily at bedtime   BD Insulin Syringe Ultra-Fine 0.5 mL 30 gauge x 1/2\" miscellaneous syringe 12/11/2018 USE THREE TIMES WEEKLY   BD Specialty Use Needles 30 gauge x 1/2\" miscellaneous needle 11/14/2018 use as directed for 90 days use for the 3 days weekly " inections of the actimune   BD Syringe 1 mL miscellaneous syringe 11/14/2018 use as directed for 90 days use for 3 days weekly injections of the actimune   clotrimazole-betamethasone 1-0.05 % topical cream 08/26/2020 apply to the affected and surrounding areas of skin by topical route 2 times per day in the morning and evening for 2 weeks   hydrochlorothiazide 12.5 mg oral tablet 08/26/2020 take 1 tablet by oral route daily as needed for 30 days for pedal edema   ipratropium-albuterol 0.5 mg-3 mg(2.5 mg base)/3 mL inhalation solution for nebulization 08/26/2020 use in nebulizer as directed 3 times a day for 30 days   meclizine 25 mg oral tablet 08/26/2020 take 1 tablet (25 mg) by oral route 3 times per day as needed for vertigo   Ventolin HFA 90 mcg/actuation inhalation HFA aerosol inhaler 08/26/2020 inhale 1 puff (90 mcg) by inhalation route every 6 hours as needed for 30 days         Allergy List  Allergen Name Date Reaction Notes   Keflex --  --  --          Family Medical History  Disease Name Relative/Age Notes   Alcohol abuse Father/   Father   Family History Of Breast Cancer Sister/   Sister         Social History  Finding Status Start/Stop Quantity Notes   Alcohol Never --/-- --  never drinks alcohol   Exercises regularly --  --/-- --  0 times per week   Recreational Drug Use Never --/-- --  never used   Second hand smoke exposure Unknown --/-- --  no   Tobacco Former --/54 0.5 PPD smokes less than 1 pack per day, for 15 years, in the past used other tobacco products   Uses seatbelts --  --/-- --  yes         Immunizations  NameDate Admin Mfg Trade Name Lot Number Route Inj VIS Given VIS Publication   [id number]/03/2019 NE Not Entered  NE NE 07/05/2019    Comments:    Mpzdmrfd45/03/2019 NE Not Entered  NE NE 07/05/2019    Comments:          Review of Systems  · Constitutional  o Denies  o : fever  · HENT  o Admits  o : sinus pain, nasal congestion, nasal discharge, postnasal drip  o Denies  o : vertigo,  "lightheadedness, recent head injury  · Cardiovascular  o Admits  o : dyspnea on exertion  o Denies  o : chest pain, irregular heart beats, syncope, reviewed and unchanged  · Respiratory  o Denies  o : shortness of breath, productive cough  · Gastrointestinal  o Denies  o : nausea, vomiting, abdominal pain  · Genitourinary  o Denies  o : dysuria  · Integument  o Denies  o : hair growth change, new skin lesions  · Neurologic  o Denies  o : altered mental status, tingling or numbness, difficulty concentrating, memory difficulties, seizures, tremors  · Musculoskeletal  o Denies  o : joint swelling, limitation of motion  · Endocrine  o Denies  o : cold intolerance, heat intolerance  · Psychiatric  o Denies  o : suicidal ideation, homicidal ideation  · Heme-Lymph  o Denies  o : petechiae, lymph node enlargement or tenderness  · Allergic-Immunologic  o Denies  o : frequent illnesses      Vitals  Date Time BP Position Site L\R Cuff Size HR RR TEMP (F) WT  HT  BMI kg/m2 BSA m2 O2 Sat FR L/min FiO2        11/24/2020 10:40 /70 Sitting    76 - R  98 208lbs 0oz 5'  3.5\" 36.27 2.06 96 %            Physical Examination  · Constitutional  o Appearance  o : well-nourished, well developed, no obvious deformities present  · Eyes  o Conjunctivae  o : conjunctiva normal, no exudates present  o Pupils and Irises  o : pupils equal and round, pupils reactive to light bilaterally, symmetric light reflex, normal accommodation bilaterally  o Eyelids/Ocular Adnexae  o : eyelid appearance normal  · Ears, Nose, Mouth and Throat  o Ears  o :   § External Ears  § : external auditory canals normal  § Otoscopic Examination  § : tympanic membrane normal bilaterally, no tympanic membrane perforations present, no PE tubes present  o Nose  o :   § External Nose  § : appearance normal  § Intranasal Exam  § : mucosa within normal limits, vestibules normal, no intranasal lesions present, septum midline (+) SINUS TENDENRESS TO THE MAXILLA AND " FRONTAL SINUS AREAS BILAT TO PALPATION   § Nasopharynx  § : no lesions or inflammation  o Oral Cavity  o :   § Oral Mucosa  § : mucous membranes moist and normal  § Lips  § : lip appearance normal  § Teeth  § : normal dentition for age  § Tongue  § : tongue moist and normal  § Palate  § : hard palate normal, soft palate normal  · Respiratory  o Respiratory Effort  o : breathing unlabored  o Inspection of Chest  o : normal appearance  o Auscultation of Lungs  o : normal breath sounds bilaterally  · Cardiovascular  o Heart  o :   § Auscultation of Heart  § : regular rate, normal rhythm, no murmurs present  o Peripheral Vascular System  o :   § Extremities  § : no edema, no cyanosis  · Gastrointestinal  o Abdominal Examination  o : soft and nontender to palpation, nondistended, no masses present, normal bowel sounds  o Liver and spleen  o : no hepatomegaly, spleen not palpable  · Lymphatic  o Neck  o : no lymphadenopathy present  · Musculoskeletal  o Right Upper Extremity  o : normal range of motion  o Left Upper Extremity  o : normal range of motion  o Right Lower Extremity  o : normal range of motion  o Left Lower Extremity  o : normal range of motion  · Skin and Subcutaneous Tissue  o General Inspection  o : no rashes present, no lesions present  o Digits and Nails  o : no clubbing, cyanosis, or edema present  · Neurologic  o Mental Status Examination  o :   § Speech/Language  § : speech development normal for age, level of language comprehension normal for age  § Attention  § : attention normal  o Motor Examination  o :   § RUE Strength  § : strength normal  § RUE Motor Function  § : tone normal  § LUE Strength  § : strength normal  § LUE Motor Function  § : tone normal  § RLE Strength  § : strength normal  § RLE Motor Function  § : tone normal  § LLE Strength  § : strength normal  § LLE Motor Function  § : tone normal  o Gait and Station  o : pt does hobble from side to side slightly with gait but overall walks  well balanced and without assist   · Psychiatric  o Mood and Affect  o : normal mood, appropriate affect          Assessment  · Encounter for Medicare annual wellness exam     V70.0/Z00.00  · Screening for depression     V79.0/Z13.89  · Screening for alcoholism     V79.1/Z13.39  · Sinusitis, acute     461.9/J01.90  pt declined the COVID test  · Immunization counseling     V65.49/Z71.89      Plan  · Orders  o Falls Risk Assessment Completed (3288F) - V70.0/Z00.00 - 11/24/2020  o Brief hearing screening (written) [facility] () - V70.0/Z00.00 - 11/24/2020  o Annual Wellness Visit-includes a Personalized Prevention Plan of Service (PPS), SUBSEQUENT VISIT (Medicare) () - V70.0/Z00.00 - 11/24/2020  o Presence or absence of urinary incontinence assessed (ERIN) (1090F) - V70.0/Z00.00 - 11/24/2020  o Negative alcohol screening () - V79.1/Z13.39 - 11/24/2020  o ACO-39: Current medications updated and reviewed (1159F, ) - - 11/24/2020  o ACO-19: Colorectal cancer screening results documented and reviewed (3017F) - - 11/24/2020  o ACO-20: Screening Mammography documented and reviewed () - - 11/24/2020  o ACO-14: Influenza immunization administered or previously received () - - 11/24/2020   at pharmacy   o ACO-13: Fall Risk Screening with no falls in past year or only one fall without injury in the past year (1101F) - - 11/24/2020  o ACO-18: Negative screen for clinical depression using a standardized tool () - - 11/24/2020  o ACO - Pt declines to or was not able to provide an Advance Care Plan or name a Surrogate Decision Maker (1124F) - - 11/24/2020  · Medications  o Prevnar 13 (PF) 0.5 mL intramuscular syringe   SIG: inject 0.5 milliliter by intramuscular route once for 1 day   DISP: (1) Pre-filled Pen Syringe with 0 refills  Prescribed on 11/24/2020     o amoxicillin 875 mg oral tablet   SIG: take 1 tablet (875 mg) by oral route every 12 hours for 10 days   DISP: (20) Tablet with 0  refills  Prescribed on 11/24/2020     o Medications have been Reconciled  o Transition of Care or Provider Policy  · Instructions  o Health Risk Assessment has been reviewed with the patient.  o Written health screening schedule for next 5-10 years was established with patient; information scanned in chart and given/mailed to patient.  o Fall prevention methods discussed and a copy of recommendations given/mailed to patient.  o Counseled on the need for diet changes (low carbohydrate; avoid sweets) and exercise.   o Today's PHQ-9 score is: __4_  o Audit-C score of 0-4 - Negative Screen - Brief Discussion  o Handouts were given to patient: on the medicare wellness   o Take all medications as prescribed/directed.  o Patient was educated/instructed on their diagnosis, treatment and medications prior to discharge from the clinic today.  o Patient instructed to seek medical attention urgently for new or worsening symptoms.  o Call the office with any concerns or questions.  o Risks, benefits, and alternatives were discussed with the patient. The patient is aware of risks associated with: med mgt and no COVID test  o Chronic conditions reviewed and taken into consideration for today's treatment plan.  o Electronically Identified Patient Education Materials Provided Electronically  · Disposition  o Call or Return if symptoms worsen or persist.            Electronically Signed by: Shelley Poole APRN -Author on November 24, 2020 11:07:33 AM

## 2021-05-07 NOTE — PROGRESS NOTES
"   Progress Note      Patient Name: Suzanne Gutierrez   Patient ID: [id number]   Sex: Female   YOB: 1950        Visit Date: August 26, 2020    Provider: ALBAN Severino   Location: [facility]   Location Address: [address] [place] [address]   [place]  [address]   Location Phone: [phone]          Chief Complaint     Refills.  Rash on left ankle x one week.       History Of Present Illness  Suzanne Gutierrez is a 70 year old /White female who presents for evaluation and treatment of:      rash to the left outer ankle for approx. 1 week--and no painful but the ankles have been swelling--approx. 1 month now--no joint pain    and 7/2/20 pt had knee surgery right knee--and still a little painful/sore and was released from ortho now     refills:     insomnia--stable on the meds no SE     pulmonary fibrosis--just doing the telehealth visits and so far things are stable--on the meds    vertigo--flares up maybe 1-2 times monthly-but when it hits pt has to have meds on hand at all times                Past Medical History  Disease Name Date Onset Notes   Insomnia --  --    Pulmonary fibrosis --  --    Screening Mammogram 10/11/18 --    Sleep apnea --  --          Past Surgical History  Procedure Name Date Notes   Cholecstectomy --  --    Hysterectomy --  --    Tonsillectomy --  --    Tubal ligation --  --          Medication List  Name Date Started Instructions   Actimmune 100 mcg/0.5 mL subcutaneous solution  inject 1 milliliter by subcutaneous route   albuterol sulfate 90 mcg/actuation inhalation HFA aerosol inhaler  --    amitriptyline 100 mg oral tablet 05/18/2020 take 1 tablet (100 mg) by oral route once daily at bedtime   BD Insulin Syringe Ultra-Fine 0.5 mL 30 gauge x 1/2\" miscellaneous syringe 12/11/2018 USE THREE TIMES WEEKLY   BD Specialty Use Needles 30 gauge x 1/2\" miscellaneous needle 11/14/2018 use as directed for 90 days use for the 3 days weekly inections of the " actimune   BD Syringe 1 mL miscellaneous syringe 11/14/2018 use as directed for 90 days use for 3 days weekly injections of the actimune   ipratropium-albuterol 0.5 mg-3 mg(2.5 mg base)/3 mL inhalation solution for nebulization 06/18/2020 use in nebulizer as directed 3 times a day for 30 days   meclizine 25 mg oral tablet 08/20/2020 take 1 tablet (25 mg) by oral route 3 times per day as needed for vertigo   Ventolin HFA 90 mcg/actuation inhalation HFA aerosol inhaler 06/18/2020 inhale 1 puff (90 mcg) by inhalation route every 6 hours as needed for 30 days         Allergy List  Allergen Name Date Reaction Notes   Keflex --  --  --          Family Medical History  Disease Name Relative/Age Notes   Alcohol abuse Father/   Father   Family History Of Breast Cancer Sister/   Sister         Social History  Finding Status Start/Stop Quantity Notes   Alcohol Never --/-- --  never drinks alcohol   Exercises regularly --  --/-- --  0 times per week   Recreational Drug Use Never --/-- --  never used   Second hand smoke exposure Unknown --/-- --  no   Tobacco Former --/54 0.5 PPD smokes less than 1 pack per day, for 15 years, in the past used other tobacco products   Uses seatbelts --  --/-- --  yes         Immunizations  NameDate Admin Mfg Trade Name Lot Number Route Inj VIS Given VIS Publication   [id number]/03/2019 NE Not Entered  NE NE 07/05/2019    Comments:    Yegogygs05/03/2019 NE Not Entered  NE NE 07/05/2019    Comments:          Review of Systems  · Constitutional  o Admits  o : fatigue  o Denies  o : fever  · HENT  o Admits  o : vertigo  o Denies  o : lightheadedness, recent head injury  · Breasts  o Denies  o : lumps, tenderness, swelling, nipple discharge, abnormal changes in breast size, additional breast symptoms except as noted in the HPI  · Cardiovascular  o Denies  o : chest pain, irregular heart beats, rapid heart rate  · Respiratory  o Admits  o : shortness of breath, wheezing, dry cough, reviewed and  unchanged  o Denies  o : productive cough  · Gastrointestinal  o Denies  o : nausea, vomiting, abdominal pain, blood in stools  · Genitourinary  o Denies  o : dysuria  · Integument  o Admits  o : rash  o Denies  o : itching  · Neurologic  o Denies  o : altered mental status, tingling or numbness, seizures, tremors  · Musculoskeletal  o Admits  o : knee pain  o Denies  o : joint swelling, limitation of motion  · Endocrine  o Denies  o : cold intolerance, heat intolerance  · Psychiatric  o Admits  o : difficulty sleeping  o Denies  o : anxiety, depression, suicidal ideation, homicidal ideation  · Heme-Lymph  o Denies  o : petechiae, lymph node enlargement or tenderness  · Allergic-Immunologic  o Denies  o : frequent illnesses      Vitals  Date Time BP Position Site L\R Cuff Size HR RR TEMP (F) WT  HT  BMI kg/m2 BSA m2 O2 Sat HC       08/26/2020 03:34 /90 Sitting    88 - R  97.3 205lbs 16oz    95 %          Physical Examination  · Constitutional  o Appearance  o : well developed, well-nourished, in no acute distress  · Head and Face  o HEENT  o : Unremarkable wearing her mask   · Eyes  o Conjunctivae  o : conjunctivae normal  · Neck  o Inspection/Palpation  o : supple  o Thyroid  o : no thyromegaly  · Respiratory  o Respiratory Effort  o : breathing unlabored  o Auscultation of Lungs  o : clear to ascultation  · Cardiovascular  o Heart  o :   § Auscultation of Heart  § : regular rate and rhythm  o Peripheral Vascular System  o :   § Carotid Arteries  § : normal pulses bilaterally, no bruits present  § Extremities  § : BLE ankle edema  · Breasts  o Inspection of Breasts  o : breasts symmetrical, no skin changes, no discharge present, no deformities present  · Gastrointestinal  o Abdominal Examination  o :   § Abdomen  § : bowel sounds present, non-distended, non-tender  · Lymphatic  o Neck  o : no lymphadenopathy present  · Musculoskeletal  o General  o :   § General Musculoskeletal  § : No joint swelling or  deformity., Muscle tone, strength, and development grossly normal. right knee   · Skin and Subcutaneous Tissue  o General Inspection  o : skin turgor normal, texture normal (+) slight circular rash tot he outer left ankle   · Neurologic  o Gait and Station  o :   § Gait Screening  § : normal gait  · Psychiatric  o Mood and Affect  o : mood normal, affect appropriate          Assessment  · Visit for screening mammogram     V76.12/Z12.31  · Dermatitis     692.9/L30.9  · Fatigue     780.79/R53.83  · Insomnia, unspecified     780.52/G47.00  · Post-menopause     V49.81/Z78.0  · Lung fibrosis     515/J84.10  · Vertigo     780.4/R42  · DJD (degenerative joint disease) of knee     715.36/M17.10  · Screening for colon cancer     V76.51/Z12.11  · Pre-diabetes     790.29/R73.03  · Encounter for hepatitis C screening test for low risk patient     V73.89/Z11.59  · Ankle edema     719.07/M25.473    Problems Reconciled  Plan  · Orders  o Screening Mammogram 2D Bilateral (61474, ) - V76.12/Z12.31 - 08/26/2020   [place]   o CBC with Auto Diff [facility] ([id number]) - 780.79/R53.83 - 08/26/2020  o CMP [facility] (86135) - 780.79/R53.83 - 08/26/2020  o Thyroid Profile (THYII) - 780.79/R53.83 - 08/26/2020  o DEXA Bone Density, 1 or more sites, axial skeleton [facility] (88356) - V49.81/Z78.0 - 08/26/2020   [place]   o Hgb A1c [facility] (78228) - 790.29/R73.03 - 08/26/2020  o ACO-13: Fall Risk Screening with no falls in past year or only one fall without injury in the past year (1101F) - - 08/26/2020  o ACO-39: Current medications updated and reviewed () - - 08/26/2020  o Cologuard (, 46149) - V76.51/Z12.11 - 08/26/2020  o Hepatitis C antibody MEDICARE screening [facility] () - V73.89/Z11.59 - 08/26/2020  · Medications  o hydrochlorothiazide 12.5 mg oral tablet   SIG: take 1 tablet by oral route daily as needed for 30 days for pedal edema   DISP: (30) tablets with 2 refills  Prescribed on 08/26/2020     o clotrimazole-betamethasone 1-0.05 %  topical cream   SIG: apply to the affected and surrounding areas of skin by topical route 2 times per day in the morning and evening for 2 weeks   DISP: (1) 15 gm tube with 0 refills  Prescribed on 08/26/2020     o amitriptyline 100 mg oral tablet   SIG: take 1 tablet (100 mg) by oral route once daily at bedtime   DISP: (90) Tablet with 1 refills  Adjusted on 08/26/2020     o ipratropium-albuterol 0.5 mg-3 mg(2.5 mg base)/3 mL inhalation solution for nebulization   SIG: use in nebulizer as directed 3 times a day for 30 days   DISP: (270) [place] with 1 refills  Adjusted on 08/26/2020     o meclizine 25 mg oral tablet   SIG: take 1 tablet (25 mg) by oral route 3 times per day as needed for vertigo   DISP: (30) tablets with 5 refills  Adjusted on 08/26/2020     o Ventolin HFA 90 mcg/actuation inhalation HFA aerosol inhaler   SIG: inhale 1 puff (90 mcg) by inhalation route every 6 hours as needed for 30 days   DISP: (3) 8 gm canisters with 1 refills  Adjusted on 08/26/2020     o Medications have been Reconciled  o Transition of Care or Provider Policy  · Instructions  o Avoid any electronic use for at least 30 minutes prior to bed time. Cell phone screens, tablets and TVs immitate daylight, so your brain can become confused on the time of day. No caffeine use in the late afternoon and evenings.  o Stop taking calcium supplements for at least 48 hours prior to your exam. Failure to stop supplements could alter results, and the radiologists will require you to reschedule your test.  o Take all medications as prescribed/directed.  o Patient was educated/instructed on their diagnosis, treatment and medications prior to discharge from the clinic today.  o Patient instructed to seek medical attention urgently for new or worsening symptoms.  o Call the office with any concerns or questions.  o Risks, benefits, and alternatives were discussed with the patient. The patient is aware of risks associated with: med mgt    o Chronic conditions reviewed and taken into consideration for today's treatment plan.  · Disposition  o Call or Return if symptoms worsen or persist.  o Return Visit Request in/on 6 months +/- 2 days (4860).            Electronically Signed by: ALBAN Severino -Author on August 26, 2020 04:25:17 PM

## 2021-05-07 NOTE — PROGRESS NOTES
Progress Note      Patient Name: Suzanne Gutierrez   Patient ID: [id number]   Sex: Female   YOB: 1950        Visit Date: May 16, 2018    Provider: KEVIN Severino   Location: [facility]   Location Address: [address] [place] [address]  [id number]   Location Phone: [phone]          Chief Complaint     patient c/o headaches x 4 days       History Of Present Illness  Suzanne Gutierrez is a 68 year old /White female who presents for evaluation and treatment of:      pt HA started over the weekend and pressure behind the eyes and across the cheek bones--then also had the diarrhea over the weekend as well--took aleve for the s/s     not affecting her lungs yet and does use her meds faithfully and Flonase     needs her Elavil refilled as well--insomnia       Past Medical History  Disease Name Date Onset Notes   Insomnia --  --    Pulmonary fibrosis --  --    Sleep apnea --  --          Past Surgical History  Procedure Name Date Notes   Cholecstectomy --  --    Hysterectomy --  --    Tonsillectomy --  --    Tubal ligation --  --          Medication List  Name Date Started Instructions   Actimmune 100 mcg/0.5 mL subcutaneous solution  inject 1 milliliter by subcutaneous route   amitriptyline 100 mg oral tablet 03/09/2018 take 1 tablet (100 mg) by oral route once daily at bedtime   fluticasone 50 mcg/actuation nasal spray,suspension 03/13/2018 INHALE 2 SPRAYS INTO EACH NOSTRIL ONCE DAILY   Symbicort 160-4.5 mcg/actuation inhalation HFA aerosol inhaler  inhale 2 puffs by inhalation route 2 times per day in the morning and evening   Ventolin HFA 90 mcg/actuation inhalation HFA aerosol inhaler  inhale 1 puff (90 mcg) by inhalation route every 6 hours as needed         Allergy List  Allergen Name Date Reaction Notes   Keflex --  --  --          Family Medical History  Disease Name Relative/Age Notes   Alcohol Abuse / Father    Father/    Family History Of Breast Cancer /  Sister    Sister/          Social History  Finding Status Start/Stop Quantity Notes   Alcohol Never --/-- --  never drinks alcohol   Exercises regularly --  --/-- --  0 times per week   Recreational Drug Use Never --/-- --  never used   Second hand smoke exposure Unknown --/-- --  no   Tobacco Former --/54 0.5 PPD smokes less than 1 pack per day, for 15 years, in the past used other tobacco products   Uses seatbelts --  --/-- --  yes         Review of Systems  · Constitutional  o Admits  o : fatigue, headache  o Denies  o : fever  · HENT  o Admits  o : lightheadedness, sinus pain, postnasal drip  o Denies  o : sore throat, ear pain  · Cardiovascular  o Denies  o : chest pain, irregular heart beats  · Respiratory  o Admits  o : cough  o Denies  o : shortness of breath, productive cough  · Gastrointestinal  o Admits  o : nausea, diarrhea  · Genitourinary  o Denies  o : dysuria, urinary retention  · Integument  o Denies  o : rash, hair growth change, new skin lesions  · Neurologic  o Admits  o : headaches  o Denies  o : dizziness  · Musculoskeletal  o Denies  o : joint swelling, limitation of motion  · Endocrine  o Denies  o : cold intolerance, heat intolerance  · Psychiatric  o Denies  o : suicidal ideation, homicidal ideation  · Heme-Lymph  o Denies  o : petechiae, lymph node enlargement or tenderness  · Allergic-Immunologic  o Admits  o : sinus allergy symptoms  o Denies  o : frequent illnesses      Vitals  Date Time BP Position Site L\R Cuff Size HR RR TEMP(F) WT  HT  BMI kg/m2 BSA m2 O2 Sat        05/16/2018 01:54 /74 Sitting    92 - R  97.5 216lbs 0oz    98 %           Physical Examination  · Constitutional  o Appearance  o : well developed, well-nourished, in no acute distress  · Ears, Nose, Mouth and Throat  o Ears  o :   § Otoscopic Examination  § : tympanic membrane appearance within normal limits bilaterally---(+) frontal sinus tenderness bilat   o Throat  o :   § Oropharynx  § : no inflammation or  lesions present  · Respiratory  o Respiratory Effort  o : breathing unlabored  o Auscultation of Lungs  o : clear to auscultation  · Cardiovascular  o Heart  o :   § Auscultation of Heart  § : regular rate and rhythm  · Skin and Subcutaneous Tissue  o General Inspection  o : normal tone  · Psychiatric  o General  o : normal affect and calm              Assessment  · Sinusitis, acute     461.9/J01.90  · Insomnia     780.52/G47.00      Plan  · Orders  o ACO-14: Influenza immunization administered or previously received () - - 05/16/2018  o ACO-15: Pneumococcal Vaccine Administered or Previously Received (4040F) - - 05/16/2018  o ACO-39: Current medications updated and reviewed () - - 05/16/2018  · Medications  o amoxicillin 875 mg oral tablet   SIG: take 1 tablet (875 mg) by oral route every 12 hours for 10 days   DISP: (20) tablets with 0 refills  Prescribed on 05/16/2018     o amitriptyline 100 mg oral tablet   SIG: take 1 tablet (100 mg) by oral route once daily at bedtime   DISP: (90) tablet with 1 refills  Adjusted on 05/16/2018     · Instructions  o Take all medications as prescribed/directed.  o Rest. Increase Fluids.  o Patient was educated/instructed on their diagnosis, treatment and medications prior to discharge from the clinic today.  o Patient instructed to seek medical attention urgently for new or worsening symptoms.  o Call the office with any concerns or questions.  o Chronic conditions reviewed and taken into consideration for today's treatment plan.  · Disposition  o Call or Return if symptoms worsen or persist.            Electronically Signed by: KEVIN Severino -Author on May 16, 2018 02:16:57 PM

## 2021-05-07 NOTE — PROGRESS NOTES
Progress Note      Patient Name: Suzanne Gutierrez   Patient ID: [id number]   Sex: Female   YOB: 1950        Visit Date: May 19, 2020    Provider: ALBAN Severino   Location: [facility]   Location Address: [address] [place] [address]   [place]  [address]   Location Phone: [phone]          Chief Complaint     hospital follow up, was released on Saturday, on Saturday she had a fight with daughter, the other daughter went to her house and patient could not remember anything.       History Of Present Illness  Suzanne Gutierrez is a 70 year old /White female who presents for evaluation and treatment of:      pt was taken tot [facility] ER dept Saturday evening (9-10pm) via EMS--for the following s/s:    BP excessively elevated--was 181/110--and did come down from the 1st time taken per report from EMS   memory changes/loss  disoriented-did not know ppl then    per Hx: reported by other daughter:  she'd just been in an argument with another family member--and then all the s/s started-    RN in the ER at [facility] told his daughter (Tiesha) they did CT of the brain, MRI of brain, blood work, and UA--    everything was good--    as far as she knows the daughter reports they gave no meds and released her to home to F/U here-    pt slept from 5am Sunday morning until yesterday about 6 pm--    daughter reports they'd wake her up periodically and she could answer them and then go back to sleep    pt is experiencing the HA since Sunday--    pt also admits to a dizziness like vertigo with turning her head and bending over or rolling over--and does admit to a Hx of vertigo in the past that DR Simpson treated her for and meclizine did sound familiar       Past Medical History  Disease Name Date Onset Notes   Insomnia --  --    Pulmonary fibrosis --  --    Screening Mammogram 10/11/18 --    Sleep apnea --  --          Past Surgical History  Procedure Name Date Notes   Cholecstectomy --  --   "  Hysterectomy --  --    Tonsillectomy --  --    Tubal ligation --  --          Medication List  Name Date Started Instructions   Actimmune 100 mcg/0.5 mL subcutaneous solution  inject 1 milliliter by subcutaneous route   amitriptyline 100 mg oral tablet 05/18/2020 take 1 tablet (100 mg) by oral route once daily at bedtime   BD Insulin Syringe Ultra-Fine 0.5 mL 30 gauge x 1/2\" miscellaneous syringe 12/11/2018 USE THREE TIMES WEEKLY   BD Specialty Use Needles 30 gauge x 1/2\" miscellaneous needle 11/14/2018 use as directed for 90 days use for the 3 days weekly inections of the actimune   BD Syringe 1 mL miscellaneous syringe 11/14/2018 use as directed for 90 days use for 3 days weekly injections of the actimune   ipratropium-albuterol 0.5 mg-3 mg(2.5 mg base)/3 mL inhalation solution for nebulization 01/29/2020 use in nebulizer as directed 3 times a day for 30 days   Symbicort 160-4.5 mcg/actuation inhalation HFA aerosol inhaler  inhale 2 puffs by inhalation route 2 times per day in the morning and evening   Ventolin HFA 90 mcg/actuation inhalation HFA aerosol inhaler 04/29/2020 inhale 1 puff (90 mcg) by inhalation route every 6 hours as needed for 30 days         Allergy List  Allergen Name Date Reaction Notes   Keflex --  --  --          Family Medical History  Disease Name Relative/Age Notes   Alcohol abuse Father/   Father   Family History Of Breast Cancer Sister/   Sister         Social History  Finding Status Start/Stop Quantity Notes   Alcohol Never --/-- --  never drinks alcohol   Exercises regularly --  --/-- --  0 times per week   Recreational Drug Use Never --/-- --  never used   Second hand smoke exposure Unknown --/-- --  no   Tobacco Former --/54 0.5 PPD smokes less than 1 pack per day, for 15 years, in the past used other tobacco products   Uses seatbelts --  --/-- --  yes         Immunizations  NameDate Admin Mfg Trade Name Lot Number Route Inj VIS Given VIS Publication   [id number]/03/2019 NE Not " Entered  NE NE 07/05/2019    Comments:    Ywcncfmf64/03/2019 NE Not Entered  NE NE 07/05/2019    Comments:          Review of Systems  · Constitutional  o Admits  o : fatigue, headache  o Denies  o : fever, chills, body aches  · Eyes  o Denies  o : blurred or double vision, blurred vision, changes in vision  · HENT  o Admits  o : headaches, vertigo  o Denies  o : lightheadedness, sinus pain, nasal discharge, sore throat  · Cardiovascular  o Denies  o : chest pain, palpitations (fast, fluttering, or skipping beats), swelling (feet, ankles, hands), shortness of breath while walking or lying flat  · Respiratory  o Admits  o : reviewed and unchanged  o Denies  o : shortness of breath, chronic or frequent cough, asthma or wheezing, COPD  · Gastrointestinal  o Admits  o : nausea  o Denies  o : vomiting, diarrhea  · Genitourinary  o Denies  o : dysuria  · Integument  o Denies  o : rash  · Neurologic  o Admits  o : memory difficulties, loss of balance, headaches, dizziness  o Denies  o : altered mental status, tingling or numbness, difficulty concentrating, seizures, tremors, speech delay  · Musculoskeletal  o Denies  o : joint pain, back pain  · Endocrine  o Denies  o : thyroid disease, diabetes, heat or cold intolerance, excessive thirst or urination  · Psychiatric  o Denies  o : suicidal ideation, homicidal ideation  · Heme-Lymph  o Denies  o : bleeding or bruising tendency, anemia  · Allergic-Immunologic  o Denies  o : frequent illnesses      Vitals  Date Time BP Position Site L\R Cuff Size HR RR TEMP (F) WT  HT  BMI kg/m2 BSA m2 O2 Sat        05/19/2020 11:09 /80 Sitting    111 - R  97.9     95 %          Physical Examination  · Constitutional  o Appearance  o : well developed, well-nourished, in no acute distress  · Head and Face  o HEENT  o : Unremarkable-wearing a mask  · Eyes  o Conjunctivae  o : conjunctivae normal  · Neck  o Inspection/Palpation  o : supple  o Thyroid  o : no  thyromegaly  · Respiratory  o Respiratory Effort  o : breathing unlabored  o Auscultation of Lungs  o : clear to ascultation  · Cardiovascular  o Heart  o :   § Auscultation of Heart  § : regular rate and rhythm  o Peripheral Vascular System  o :   § Extremities  § : no edema  · Gastrointestinal  o Abdominal Examination  o :   § Abdomen  § : soft  · Lymphatic  o Neck  o : no lymphadenopathy present  · Musculoskeletal  o General  o :   § General Musculoskeletal  § : No joint swelling or deformity. Muscle tone, strength, and development grossly normal.  · Skin and Subcutaneous Tissue  o General Inspection  o : skin turgor normal, texture normal  · Neurologic  o Gait and Station  o :   § Gait Screening  § : pt in a wheelchair today  · Psychiatric  o Mood and Affect  o : mood normal, affect appropriate-looks tired--and daughter did much of the talking--although pt made good eye contact and did answer questions appropriately           Results     once the ER notes and labs and imaging is received from [facility] I will review --and should anything else be needed such as further F/U labs or EKG or CXR--I will contact them regarding this       Assessment  · Fatigue     780.79/R53.83  · Headache     784.0/R51  referral to neurologist and treatment with meclizine for vertigo and then pt to stay well hydrated--and the daughter reports only ate once yesterday and not eaten today--but pt is hungry and they will get her something to eat once leaves here today   · Vertigo     780.4/R42  Tx with meclizine and refer to neurologist   · Follow up     V67.9/Z09  from the [facility] ER visit   · Memory changes     780.93/R41.3  improving with days of time since ER visit and refer to neurologist for further F/U and workup if needed   · Dizziness     780.4/R42  will treat as vertigo with meclizine and refer her to the neurologist   · Elevated blood pressure reading without diagnosis of hypertension     796.2/R03.0  only per EMS and in ER dept and  completely resolved now     Problems Reconciled  Plan  · Orders  o ACO-39: Current medications updated and reviewed () - - 05/19/2020  o NEUROLOGY CONSULTATION. (NEURO) - 784.0/R51, 780.4/R42, 780.93/R41.3 - 05/19/2020   onset was sudden and pt was eval'd at [facility] they did the CT MRI labs and UA and pt needs F/U and eval and Tx    concerning for TIA s/s --pt is improved   · Medications  o meclizine 25 mg oral tablet   SIG: take 1 tablet (25 mg) by oral route 3 times per day as needed for vertigo   DISP: (30) tablets with 0 refills  Prescribed on 05/19/2020     o Medications have been Reconciled  o Transition of Care or Provider Policy  · Instructions  o Take all medications as prescribed/directed.  o Rest. Increase Fluids.  o Patient was educated/instructed on their diagnosis, treatment and medications prior to discharge from the clinic today.  o Patient instructed to seek medical attention urgently for new or worsening symptoms.  o Call the office with any concerns or questions.  o Risks, benefits, and alternatives were discussed with the patient. The patient is aware of risks associated with: meclizine use  o Chronic conditions reviewed and taken into consideration for today's treatment plan.  · Disposition  o Call or Return if symptoms worsen or persist.            Electronically Signed by: ALBAN Severino -Author on May 19, 2020 12:12:51 PM

## 2021-05-07 NOTE — PROGRESS NOTES
"   Progress Note      Patient Name: Suzanne Gutierrez   Patient ID: [id number]   Sex: Female   YOB: 1950        Visit Date: January 13, 2021    Provider: Yoli Ku DO   Location: [facility]   Location Address: [address] [place] [address] [place]  [address]   Location Phone: [phone]          Chief Complaint     Cough, SOA, and exposure       History Of Present Illness  Suzanne Gutierrez is a 70 year old /White female who presents for evaluation and treatment of:      1.) COUGH : Onset - 2 weeks ago. Per patient - history of pulmonary fibrosis. Recent visit with pulmonology - patient was recently prescribed PO prednisone + Azithromycin - finished course. She admits to significant improvement of her sxs. She presents today secondary to recent exposure to COVID 19 via grandchild. She denies fevers or chills. She denies loss of taste or smell.       Past Medical History  Disease Name Date Onset Notes   Insomnia --  --    Pre-diabetes --  --    Pulmonary fibrosis --  --    Screening Mammogram 10/11/18 --    Sleep apnea --  --    Vertigo --  --          Past Surgical History  Procedure Name Date Notes   Cholecstectomy --  --    Hysterectomy --  --    Tonsillectomy --  --    Tubal ligation --  --          Medication List  Name Date Started Instructions   Actimmune 100 mcg/0.5 mL subcutaneous solution  inject 1 milliliter by subcutaneous route   albuterol sulfate 90 mcg/actuation inhalation HFA aerosol inhaler  --    Replaced/[employer] 90 mcg/Actuation inhalation aerosol 12/15/2020 INHALE 1 PUFF BY INHALATION ROUTE EVERY 6 HOURS AS NEEDED FOR 30 DAYS   amitriptyline 100 mg oral tablet 08/26/2020 take 1 tablet (100 mg) by oral route once daily at bedtime   BD Insulin Syringe Ultra-Fine 0.5 mL 30 gauge x 1/2\" miscellaneous syringe 12/11/2018 USE THREE TIMES WEEKLY   BD Specialty Use Needles 30 gauge x 1/2\" miscellaneous needle 11/14/2018 use as directed for 90 days use for the 3 days " weekly inections of the actimune   BD Syringe 1 mL miscellaneous syringe 11/14/2018 use as directed for 90 days use for 3 days weekly injections of the actimune   hydrochlorothiazide 12.5 mg oral tablet 08/26/2020 take 1 tablet by oral route daily as needed for 30 days for pedal edema   ipratropium-albuterol 0.5 mg-3 mg(2.5 mg base)/3 mL inhalation solution for nebulization 08/26/2020 use in nebulizer as directed 3 times a day for 30 days   meclizine 25 mg oral tablet 08/26/2020 take 1 tablet (25 mg) by oral route 3 times per day as needed for vertigo   Prevnar 13 (PF) 0.5 mL intramuscular syringe 11/24/2020 inject 0.5 milliliter by intramuscular route once for 1 day         Allergy List  Allergen Name Date Reaction Notes   Keflex --  --  --          Family Medical History  Disease Name Relative/Age Notes   Alcohol abuse Father/   Father   Family History Of Breast Cancer Sister/   Sister         Social History  Finding Status Start/Stop Quantity Notes   Alcohol Never --/-- --  never drinks alcohol   Exercises regularly --  --/-- --  0 times per week   Recreational Drug Use Never --/-- --  never used   Second hand smoke exposure Unknown --/-- --  no   Tobacco Former --/54 0.5 PPD smokes less than 1 pack per day, for 15 years, in the past used other tobacco products   Uses seatbelts --  --/-- --  yes         Immunizations  NameDate Admin Mfg Trade Name Lot Number Route Inj VIS Given VIS Publication   [id number]/03/2019 NE Not Entered  NE NE 07/05/2019    Comments:    Beocizxa68/03/2019 NE Not Entered  NE NE 07/05/2019    Comments:          Review of Systems  · Constitutional  o Denies  o : fever, chills  · Eyes  o Denies  o : discharge from eye, eye pain  · HENT  o Denies  o : lightheadedness, nasal congestion  · Cardiovascular  o Denies  o : chest pain, syncope  · Respiratory  o Admits  o : cough  · Gastrointestinal  o Denies  o : nausea, vomiting  · Integument  o Denies  o : rash,  itching  · Neurologic  o Denies  o : altered mental status, tingling or numbness  · Musculoskeletal  o Denies  o : muscle pain, muscle cramps  · Psychiatric  o Denies  o : hallucinations, delusions      Vitals  Date Time BP Position Site L\R Cuff Size HR RR TEMP (F) WT  HT  BMI kg/m2 BSA m2 O2 Sat FR L/min FiO2 HC       01/13/2021 09:58 AM      63 - R  97     92 %  21%          Physical Examination  · Constitutional  o Appearance  o : alert, in no acute distress  · Eyes  o Conjunctivae  o : conjunctivae normal  · Neck  o Inspection/Palpation  o : supple  · Respiratory  o Respiratory Effort  o : breathing unlabored  o Auscultation of Lungs  o : wheezing noted of bilateral upper lobes  · Cardiovascular  o Peripheral Vascular System  o :   § Extremities  § : no edema  · Lymphatic  o Neck  o : no lymphadenopathy present  · Skin and Subcutaneous Tissue  o General Inspection  o : no rash appreciated   · Psychiatric  o Mood and Affect  o : very pleasant               Assessment  · Cough     786.2/R05    A.) COVID testing as noted w/ precautions discussed. Fluids recommended. Contact clinic with any alarming sxs.         Plan  · Orders  o ACO-39: Current medications updated and reviewed (1159F, ) - - 01/13/2021  o [facility] NCOV2 (send-out) (08622) - 786.2/R05 - 01/13/2021  · Medications  o Medications have been Reconciled  o Transition of Care or Provider Policy  · Instructions  o Rest. Increase Fluids.  o Patient was educated/instructed on their diagnosis, treatment and medications prior to discharge from the clinic today.  · Disposition  o Call or Return if symptoms worsen or persist.            Electronically Signed by: Yoli Ku DO - on January 13, 2021 11:05:00 AM

## 2021-05-07 NOTE — PROGRESS NOTES
"   Progress Note      Patient Name: Suzanne Gutierrez   Patient ID: [id number]   Sex: Female   YOB: 1950        Visit Date: March 4, 2019    Provider: KEVIN Severino   Location: [facility]   Location Address: [address] [place] [address]  [id number]   Location Phone: [phone]          Chief Complaint     Friday she started with headache, diarrhea, and nausea, she is using nebulizer due to SOA, it only works for about 1 hour, some body aches       History Of Present Illness  Suzanne Gutierrez is a 68 year old /White female who presents for evaluation and treatment of:      1) pt here with the worsening s/s of her normally stable pulmonary fibrosis--using her nebulizer and mucinex--productive cough--yellow sputum-very fragile lungs     2) then flu s/s and N/V/D that started Friday and then the body aches started over the weekend--and the N/V has slowed down     3) been exposed to flu as well       Past Medical History  Disease Name Date Onset Notes   Insomnia --  --    Pulmonary fibrosis --  --    Sleep apnea --  --          Past Surgical History  Procedure Name Date Notes   Cholecstectomy --  --    Hysterectomy --  --    Tonsillectomy --  --    Tubal ligation --  --          Medication List  Name Date Started Instructions   Actimmune 100 mcg/0.5 mL subcutaneous solution  inject 1 milliliter by subcutaneous route   amitriptyline 100 mg oral tablet 11/14/2018 take 1 tablet (100 mg) by oral route once daily at bedtime for 90 days   BD Insulin Syringe Ultra-Fine 0.5 mL 30 gauge x 1/2\" miscellaneous syringe 12/11/2018 USE THREE TIMES WEEKLY   BD Specialty Use Needles 30 gauge x 1/2\" miscellaneous needle 11/14/2018 use as directed for 90 days use for the 3 days weekly inections of the actimune   BD Syringe 1 mL miscellaneous syringe 11/14/2018 use as directed for 90 days use for 3 days weekly injections of the actimune   Floxin 0.3 % otic (ear) drops 11/14/2018 " instill 10 drops (1.5 mg) into left ear by otic route 2 times per day for 7 days   fluticasone 50 mcg/actuation nasal spray,suspension 03/13/2018 INHALE 2 SPRAYS INTO EACH NOSTRIL ONCE DAILY   ipratropium-albuterol 0.5 mg-3 mg(2.5 mg base)/3 mL inhalation solution for nebulization 11/14/2018 use in nebulizer as directed 3 times a day for 30 days   Symbicort 160-4.5 mcg/actuation inhalation HFA aerosol inhaler  inhale 2 puffs by inhalation route 2 times per day in the morning and evening   Ventolin HFA 90 mcg/actuation inhalation HFA aerosol inhaler  inhale 1 puff (90 mcg) by inhalation route every 6 hours as needed         Allergy List  Allergen Name Date Reaction Notes   Keflex --  --  --          Family Medical History  Disease Name Relative/Age Notes   Alcohol Abuse / Father    Father/    Family History Of Breast Cancer / Sister    Sister/          Social History  Finding Status Start/Stop Quantity Notes   Alcohol Never --/-- --  never drinks alcohol   Exercises regularly --  --/-- --  0 times per week   Recreational Drug Use Never --/-- --  never used   Second hand smoke exposure Unknown --/-- --  no   Tobacco Former --/54 0.5 PPD smokes less than 1 pack per day, for 15 years, in the past used other tobacco products   Uses seatbelts --  --/-- --  yes         Review of Systems  · Constitutional  o Admits  o : fatigue, headache, body aches  o Denies  o : fever, chills  · HENT  o Admits  o : nasal congestion, nasal discharge, postnasal drip  o Denies  o : sinus pain, sore throat  · Cardiovascular  o Denies  o : chest pain, palpitations (fast, fluttering, or skipping beats), swelling (feet, ankles, hands), shortness of breath while walking or lying flat  · Respiratory  o Admits  o : shortness of breath, wheezing, cough, abnormal sputum production, productive cough  · Gastrointestinal  o Admits  o : nausea, vomiting, diarrhea  · Genitourinary  o Denies  o : dysuria  · Neurologic  o Denies  o : lightheaded or dizzy,  "stroke, headaches  · Musculoskeletal  o Denies  o : joint pain, back pain  · Endocrine  o Denies  o : thyroid disease, diabetes, heat or cold intolerance, excessive thirst or urination  · Heme-Lymph  o Denies  o : bleeding or bruising tendency, anemia      Vitals  Date Time BP Position Site L\R Cuff Size HR RR TEMP(F) WT  HT  BMI kg/m2 BSA m2 O2 Sat HC       03/04/2019 01:21 /80 Sitting    102 - R  97.8 214lbs 8oz 5'  4\" 36.82 2.1 92 %           Physical Examination  · Constitutional  o Appearance  o : well developed, well-nourished, in no acute distress  · Head and Face  o HEENT  o : Unremarkable  · Eyes  o Conjunctivae  o : conjunctivae normal  · Neck  o Inspection/Palpation  o : supple  o Thyroid  o : no thyromegaly  · Respiratory  o Respiratory Effort  o : breathing unlabored  o Auscultation of Lungs  o : tight throughout and faint tight wheezes noted through out   · Cardiovascular  o Heart  o :   § Auscultation of Heart  § : regular rate and rhythm  o Peripheral Vascular System  o :   § Extremities  § : no edema  · Gastrointestinal  o Abdominal Examination  o :   § Abdomen  § : soft  · Lymphatic  o Neck  o : no lymphadenopathy present  · Musculoskeletal  o General  o :   § General Musculoskeletal  § : No joint swelling or deformity., Muscle tone, strength, and development grossly normal.  · Skin and Subcutaneous Tissue  o General Inspection  o : skin turgor normal, texture normal  · Neurologic  o Gait and Station  o :   § Gait Screening  § : normal gait  · Psychiatric  o Mood and Affect  o : mood normal, affect appropriate          Assessment  · Fatigue     780.79/R53.83  · Flu-like symptoms     780.99/R68.89  · Pulmonary fibrosis     515/J84.10  · Shortness of breath     786.05/R06.02  · Lower respiratory infection     519.8/J22      Plan  · Orders  o ACO-39: Current medications updated and reviewed () - - 03/04/2019  o IOP - Influenza A/B Test (73903) - 780.99/R68.89, 515/J84.10, 780.79/R53.83, " 786.05/R06.02 - 03/04/2019   Flu A-Negative Flu B-Negative  o Xray chest 2 views [facility] Preferred View ([id number]) - 780.99/R68.89, 515/J84.10, 780.79/R53.83, 786.05/R06.02 - 03/04/2019   sudden onset of worsening s/s Saturday not responding to her normal neb txs   o Solu-Medrol Injection 40mg (-8) - 515/J84.10, 786.05/R06.02, 519.8/J22 - 03/04/2019   Injection - Solu-Medrol 40mg; Dose: 1mL; Site: Right Gluteus; Route: intramuscular; Date: 03/04/2019 14:18:41; Exp: 07/30/2020; Lot: [id number]; Mfg: [employer] TradeName: methylprednisolone sod suc(PF); Location: [facility]; Administered By: Merna Murrell MA; Comment: [id number]  o IM - Injection Fee (55911) - 515/J84.10, 786.05/R06.02, 519.8/J22 - 03/04/2019  · Medications  o prednisone 5 mg oral tablets,dose pack   SIG: take as directed for 6 days   DISP: (1) 21 ct dose-pack with 0 refills  Prescribed on 03/04/2019     o amoxicillin 875 mg oral tablet   SIG: take 1 tablet (875 mg) by oral route every 12 hours for 10 days   DISP: (20) tablets with 0 refills  Prescribed on 03/04/2019     · Instructions  o Take all medications as prescribed/directed.  o Rest. Increase Fluids.  o Patient was educated/instructed on their diagnosis, treatment and medications prior to discharge from the clinic today.  o Patient instructed to seek medical attention urgently for new or worsening symptoms.  o Call the office with any concerns or questions.  o Chronic conditions reviewed and taken into consideration for today's treatment plan.  · Disposition  o Call or Return if symptoms worsen or persist.     no flu no pneumonia--             Electronically Signed by: KEVIN Severino -Author on March 4, 2019 02:32:24 PM

## 2021-05-07 NOTE — PROGRESS NOTES
Progress Note      Patient Name: Suzanne Gutierrez   Patient ID: [id number]   Sex: Female   YOB: 1950        Visit Date: March 9, 2018    Provider: ALBAN Estrada   Location: [facility]   Location Address: [address] [place] [address]  [id number]   Location Phone: [phone]          Chief Complaint     right ear pain and refill amytriptaline       History Of Present Illness  Suzanne Gutierrez is a 67 year old /White female who presents for evaluation and treatment of:      Right ear pain x 2-3 days - feels like there is something moving in the ear - denies sinus drainage or congestion -   Denies ear fullness, fever, chills or body aches.     refill amitriptyline - helps her to sleep at night - helps her fall asleep and stay asleep - denies waking up feeling droggy - denies dizziness with the medication - denies difficulty falling back asleep when wakes to urinate.       Past Medical History  Disease Name Date Onset Notes   Insomnia --  --    Pulmonary fibrosis --  --    Sleep apnea --  --          Past Surgical History  Procedure Name Date Notes   Cholecstectomy --  --    Hysterectomy --  --    Tonsillectomy --  --    Tubal ligation --  --          Medication List  Name Date Started Instructions   Actimmune 100 mcg/0.5 mL subcutaneous solution  inject 1 milliliter by subcutaneous route   amitriptyline 100 mg oral tablet  take 1 tablet (100 mg) by oral route once daily at bedtime   Symbicort 160-4.5 mcg/actuation inhalation HFA aerosol inhaler  inhale 2 puffs by inhalation route 2 times per day in the morning and evening   Ventolin HFA 90 mcg/actuation inhalation HFA aerosol inhaler  inhale 1 puff (90 mcg) by inhalation route every 6 hours as needed         Allergy List  Allergen Name Date Reaction Notes   Keflex --  --  --          Family Medical History  Disease Name Relative/Age Notes   Alcohol Abuse / Father    Father/    Family History Of Breast Cancer /  Sister    Sister/          Social History  Finding Status Start/Stop Quantity Notes   Alcohol Never --/-- --  never drinks alcohol   Exercises regularly --  --/-- --  0 times per week   Recreational Drug Use Never --/-- --  never used   Second hand smoke exposure Unknown --/-- --  no   Tobacco Former --/54 0.5 PPD smokes less than 1 pack per day, for 15 years, in the past used other tobacco products   Uses seatbelts --  --/-- --  yes         Review of Systems  · Constitutional  o Denies  o : fever, chills, body aches  · Cardiovascular  o Denies  o : chest pain, palpitations  · Respiratory  o Admits  o : shortness of breath, wheezing, COPD      Vitals  Date Time BP Position Site L\R Cuff Size HR RR TEMP(F) WT  HT  BMI kg/m2 BSA m2 O2 Sat HC       03/09/2018 01:23 /88 Sitting    91 - R  97.4 212lbs 6oz    95 %           Physical Examination  · Constitutional  o Appearance  o : well developed, well-nourished, in no acute distress  · Eyes  o Conjunctivae  o : conjunctivae normal  o Pupils and Irises  o : pupils equal and round, pupils reactive to light bilaterally  · Ears, Nose, Mouth and Throat  o Ears  o :   § External Ears  § : no auricle tenderness to palpation present  § Otoscopic Examination  § : fluid present behind right TM, no tympanic membrane lesions present  o Throat  o :   § Oropharynx  § : no inflammation or lesions present  · Neck  o Inspection/Palpation  o : supple  o Thyroid  o : no thyromegaly  · Respiratory  o Respiratory Effort  o : breathing unlabored  o Auscultation of Lungs  o : clear to ascultation  · Cardiovascular  o Heart  o :   § Auscultation of Heart  § : regular rate and rhythm  o Peripheral Vascular System  o :   § Extremities  § : no edema  · Lymphatic  o Neck  o : no lymphadenopathy present  · Musculoskeletal  o General  o :   § General Musculoskeletal  § : Muscle tone, strength, and development grossly normal.  · Skin and Subcutaneous Tissue  o General Inspection  o : NL  tone  · Neurologic  o Gait and Station  o :   § Gait Screening  § : normal gait  · Psychiatric  o Mood and Affect  o : mood normal, affect appropriate              Assessment  · Serous otitis media     381.4/H65.90  · Insomnia     780.52/G47.00      Plan  · Orders  o ACO-39: Current medications updated and reviewed () - - 03/09/2018  o ACO-18: Negative screen for clinical depression using a standardized tool () - - 03/09/2018   Verbal PHQ-2  · Medications  o fluticasone 50 mcg/actuation nasal spray,suspension   SIG: inhale 2 sprays (100 mcg) in each nostril by intranasal route once daily   DISP: (1) 16 gm aer w/adap with 0 refills  Prescribed on 03/09/2018     o amitriptyline 100 mg oral tablet   SIG: take 1 tablet (100 mg) by oral route once daily at bedtime   DISP: (90) tablet with 1 refills  Adjusted on 03/09/2018     · Instructions  o Take all medications as prescribed/directed.  o Patient was educated/instructed on their diagnosis, treatment and medications prior to discharge from the clinic today.  · Disposition  o Call or Return if symptoms worsen or persist.            Electronically Signed by: ALBAN Estrada -Author on March 9, 2018 01:50:50 PM

## 2021-05-09 VITALS
WEIGHT: 210.12 LBS | TEMPERATURE: 98.2 F | HEART RATE: 102 BPM | DIASTOLIC BLOOD PRESSURE: 92 MMHG | SYSTOLIC BLOOD PRESSURE: 142 MMHG | OXYGEN SATURATION: 94 %

## 2021-05-09 VITALS
WEIGHT: 210 LBS | HEIGHT: 63 IN | BODY MASS INDEX: 37.21 KG/M2 | OXYGEN SATURATION: 96 % | HEART RATE: 79 BPM | TEMPERATURE: 97.3 F | DIASTOLIC BLOOD PRESSURE: 72 MMHG | SYSTOLIC BLOOD PRESSURE: 126 MMHG

## 2021-05-09 VITALS
OXYGEN SATURATION: 91 % | TEMPERATURE: 97.6 F | HEART RATE: 121 BPM | DIASTOLIC BLOOD PRESSURE: 80 MMHG | SYSTOLIC BLOOD PRESSURE: 122 MMHG

## 2021-05-09 VITALS
WEIGHT: 207.37 LBS | TEMPERATURE: 96.4 F | BODY MASS INDEX: 35.4 KG/M2 | OXYGEN SATURATION: 94 % | HEIGHT: 64 IN | HEART RATE: 102 BPM | SYSTOLIC BLOOD PRESSURE: 122 MMHG | DIASTOLIC BLOOD PRESSURE: 80 MMHG

## 2021-05-09 VITALS
OXYGEN SATURATION: 95 % | WEIGHT: 206 LBS | HEART RATE: 88 BPM | SYSTOLIC BLOOD PRESSURE: 135 MMHG | TEMPERATURE: 97.3 F | DIASTOLIC BLOOD PRESSURE: 90 MMHG

## 2021-05-09 VITALS
HEIGHT: 63 IN | BODY MASS INDEX: 36.5 KG/M2 | TEMPERATURE: 97.8 F | SYSTOLIC BLOOD PRESSURE: 116 MMHG | WEIGHT: 206 LBS | HEART RATE: 76 BPM | DIASTOLIC BLOOD PRESSURE: 60 MMHG | OXYGEN SATURATION: 95 %

## 2021-05-09 VITALS
OXYGEN SATURATION: 95 % | DIASTOLIC BLOOD PRESSURE: 80 MMHG | TEMPERATURE: 97.9 F | HEART RATE: 111 BPM | SYSTOLIC BLOOD PRESSURE: 112 MMHG

## 2021-05-09 VITALS
TEMPERATURE: 98.2 F | SYSTOLIC BLOOD PRESSURE: 118 MMHG | HEART RATE: 100 BPM | WEIGHT: 211.5 LBS | DIASTOLIC BLOOD PRESSURE: 82 MMHG | OXYGEN SATURATION: 96 %

## 2021-05-09 VITALS
WEIGHT: 211.06 LBS | HEIGHT: 64 IN | OXYGEN SATURATION: 98 % | SYSTOLIC BLOOD PRESSURE: 118 MMHG | DIASTOLIC BLOOD PRESSURE: 86 MMHG | HEART RATE: 95 BPM | BODY MASS INDEX: 36.03 KG/M2 | TEMPERATURE: 97.4 F

## 2021-05-09 VITALS
HEART RATE: 108 BPM | WEIGHT: 204.56 LBS | SYSTOLIC BLOOD PRESSURE: 120 MMHG | HEIGHT: 64 IN | TEMPERATURE: 97 F | BODY MASS INDEX: 34.92 KG/M2 | DIASTOLIC BLOOD PRESSURE: 74 MMHG | OXYGEN SATURATION: 94 %

## 2021-05-09 VITALS — HEART RATE: 63 BPM | OXYGEN SATURATION: 92 % | TEMPERATURE: 97 F

## 2021-05-09 VITALS
SYSTOLIC BLOOD PRESSURE: 124 MMHG | BODY MASS INDEX: 36.86 KG/M2 | TEMPERATURE: 98 F | WEIGHT: 208 LBS | OXYGEN SATURATION: 96 % | HEART RATE: 76 BPM | HEIGHT: 63 IN | DIASTOLIC BLOOD PRESSURE: 70 MMHG

## 2021-05-09 VITALS
HEART RATE: 94 BPM | WEIGHT: 209 LBS | OXYGEN SATURATION: 92 % | DIASTOLIC BLOOD PRESSURE: 82 MMHG | SYSTOLIC BLOOD PRESSURE: 110 MMHG | TEMPERATURE: 97.1 F

## 2021-05-09 VITALS
DIASTOLIC BLOOD PRESSURE: 80 MMHG | HEART RATE: 102 BPM | BODY MASS INDEX: 36.62 KG/M2 | TEMPERATURE: 97.8 F | WEIGHT: 214.5 LBS | HEIGHT: 64 IN | OXYGEN SATURATION: 92 % | SYSTOLIC BLOOD PRESSURE: 132 MMHG

## 2021-05-09 VITALS
OXYGEN SATURATION: 93 % | WEIGHT: 216.31 LBS | HEART RATE: 108 BPM | SYSTOLIC BLOOD PRESSURE: 124 MMHG | HEIGHT: 64 IN | TEMPERATURE: 97 F | BODY MASS INDEX: 36.93 KG/M2 | DIASTOLIC BLOOD PRESSURE: 80 MMHG

## 2021-05-09 VITALS
HEIGHT: 64 IN | HEART RATE: 110 BPM | OXYGEN SATURATION: 97 % | BODY MASS INDEX: 35.24 KG/M2 | WEIGHT: 206.44 LBS | TEMPERATURE: 97 F | SYSTOLIC BLOOD PRESSURE: 124 MMHG | DIASTOLIC BLOOD PRESSURE: 72 MMHG

## 2021-05-09 VITALS
OXYGEN SATURATION: 95 % | SYSTOLIC BLOOD PRESSURE: 130 MMHG | DIASTOLIC BLOOD PRESSURE: 88 MMHG | HEART RATE: 91 BPM | WEIGHT: 212.37 LBS | TEMPERATURE: 97.4 F

## 2021-05-09 VITALS
DIASTOLIC BLOOD PRESSURE: 74 MMHG | HEART RATE: 92 BPM | OXYGEN SATURATION: 98 % | TEMPERATURE: 97.5 F | SYSTOLIC BLOOD PRESSURE: 122 MMHG | WEIGHT: 216 LBS

## 2021-05-10 NOTE — H&P
"   History and Physical      Patient Name: Suzanne Gutierrez   Patient ID: [id number]   Sex: Female   YOB: 1950    Primary Care Provider: Chelita Simpson MD   Referring Provider: Juany Palencia MD    Visit Date: June 24, 2020    Provider: ALBAN Hay   Location: [facility]   Location Address: [address] [place] [address]  [id number]   Location Phone: [phone]          Chief Complaint     ha/vertigo       History Of Present Illness  Suzanne Gutierrez is a 70 year old /White female who presents today to [facility] today referred from Juany Palencia MD. States she had a very severe argument with her daughter and family said she didn't recognize anyone and was \"just staring\". Amnesic to 2 days around the event. Cried a lot. Has had some headaches since but no other spells of amnesia. Does have a history of headaches. Prior to the event headaches were very intermittent, Aleve would be effective in aborting the headache. States she's had 2 headaches since this event. Was seen at [facility] for r/o stroke. No stroke found. No previous history of seizure. Denies urinary incontinence or tongue biting during that spell. No recurrent spell. Is doing well.      Record Review from [facility]     CTA Head/Neck  - No hemodynamically significant stenosis or large vessel occlusion    MRI Brain  - Extremely limited exam due to patient inability to tolerate.  Given these limitations, no evidence of acute or subacute infarction is demonstrated on diffusion-weighted sequences.       Past Medical History  ADHD (attention deficit hyperactivity disorder); Chronic Obstructive Pulmonary Disease; Conjunctivitis; COPD (chronic obstructive pulmonary disease); Dyspnea; Hearing loss; Hoarseness; Insomnia; Leg pain; Leg swelling; Limb Swelling; Lung disease; Muscle cramps; Myofacial muscle pain; Otalgia; Peripheral neuropathy; Pneumonia; Pulmonary fibrosis; Recurrent otitis media; Sinus " congestion/allergies; Sleep apnea; Tinnitus, right; Vertigo; Vocal cord dysfunction         Past Surgical History  Appendectomy; Colonoscopy; Gallbladder; Hand surgery; Hysterectomy; Tonsilectomy         Medication List  amitriptyline 100 mg oral tablet; meclizine 25 mg oral tablet; prednisone 10 mg oral tablet         Allergy List  Excedrin Migraine; Keflex         Family Medical History  Heart Disease; Cancer, Unspecified; Breast Neoplasm; Melanoma; Diabetes; Family history of cancer; Family history of heart disease         Social History  Alcohol (Never); Alcohol Use (Never); ; lives with children; Recreational Drug Use (Never); Tobacco (Former); Working         Review of Systems  · Constitutional  o Denies  o : chills, excessive sweating, fatigue, fever, sycope/passing out, weight gain, weight loss  · Eyes  o Denies  o : changes in vision, blurry vision, double vision  · HENT  o Admits  o : seasonal allergies  o Denies  o : loss of hearing, ringing in the ears, ear aches, sore throat, nasal congestion, sinus pain, nose bleeds  · Cardiovascular  o Admits  o : swollen legs  o Denies  o : blood clots, anemia, easy burising or bleeding, transfusions  · Respiratory  o Admits  o : shortness of breath, dry cough  o Denies  o : productive cough, pneumonia, COPD  · Gastrointestinal  o Denies  o : difficulty swallowing, reflux  · Genitourinary  o Denies  o : incontinence  · Neurologic  o Admits  o : loss of balance, dizziness/vertigo  o Denies  o : headache, seizure, stroke, tremor, falls, difficulty with sleep, numbness/tingling/paresthesia , difficulty with coordination, difficulty with dexterity, weakness  · Musculoskeletal  o Denies  o : neck stiffness/pain, swollen lymph nodes, muscle aches, joint pain, weakness, spasms, sciatica, pain radiating in arm, pain radiating in leg, low back pain  · Endocrine  o Denies  o : diabetes, thyroid disorder  · Psychiatric  o Denies  o : anxiety,  "depression      Vitals  Date Time BP Position Site L\R Cuff Size HR RR TEMP (F) WT  HT  BMI kg/m2 BSA m2 O2 Sat HC       06/24/2020 02:46 /84 Sitting    97 - R  97.6 212lbs 5oz 5'  4\" 36.44 2.09           Physical Examination  · Constitutional  o Appearance  o : well-nourished, well groomed, in no apparent distress  · Eyes  o Pupils and Irises  o : Pupils equal, round, and reactive to light and accommodation bilaterally  · Respiratory  o Auscultation of Lungs  o : Lungs were clear to ascultation bilaterally. No wheezes, rhonchi or rales were appreciated.  · Cardiovascular  o Heart  o :   § Auscultation of Heart  § : Regular rate and rhythm, no murmurs, gallops or rubs were appreciated.  o Peripheral Vascular System  o :   § Extremities  § : No peripheral edema was appreciated  · Musculoskeletal  o General  o : Normal bulk and normal tone throughout. 5/5 motor strength throughout and symmetric. Normal gait and station.  · Neurologic  o Mental Status Examination  o :   § Orientation  § : Alert and oriented to person, place, and time,  § Speech/Language  § : Intact naming, comprehension, and repetition. No dysarthria.  § Memory  § : Immediate recall is 3/3. Recall at 5 minutes is 3/3.   § Attention  § : Attention span is intact to serial 7 examination and finger tapping.   § Fund of Knowledge  § : Adequate fund of knowledge  o Cranial Nerves  o : Pupils are equal, round and reactive to light. Extraocular movements are intact. Visual fields are full. Fundoscopic examination reveals sharp disc bilaterally. Sensation in the V1-V3 distribution is intact and symmetric. Muscles of mastication are strong and symmetric. Muscles of facial expression are strong and symmetric. Hearing is intact. Palatal raise is intact and symmetric. Uvula is midline. Shoulder shrug is strong. Tongue protrudes in the midline.  o Motor Examination  o :   § RUE Strength  § : strength normal  § LUE Strength  § : strength normal  § RLE " Strength  § : strength normal  § LLE Strength  § : strength normal  o Reflexes  o : 2+ reflexes throughout and symmetric. Negative Ibarra. Negative Babinski.   o Sensation  o : Intact sensation to light touch, pinprick, vibration and proprioception throughout.  o Gait and Station  o :   § Gait Screening  § : Normal, narrow based gait, with a normal arm swing.  o Coordination  o : Intact finger to nose and heel to shin. Rapid alternating movements are intact in the upper and lower extremities.          Assessment  · Spell of altered consciousness     780.09/R40.4  Will continue to monitor. Likely secondary to extreme psychologic stress. She will not drive for 90 days after the event. Discussed signs and symptoms of stroke and the importance of going to the ER immediately if those symptoms occur.     Problems Reconciled  Plan  · Orders  o EEG (Sleep Deprived) [facility] ([id number]) - 780.09/R40.4 - 06/24/2020   4 hour sleep deprived  · Medications  o Medications have been Reconciled  o Transition of Care or Provider Policy  · Instructions  o Encouraged to follow-up with Primary Care Provider for preventative care.  o Call or Return if symptoms worsen or persist.   o Follow up in 3 months.   o I have informed the patient of no driving for 90 days per [place] state law. I have asked that they refrain from risky behavior including, but not limited to, taking baths, working at heights, and operating heavy machinery.             Electronically Signed by: ALBAN Hay -Author on June 26, 2020 11:55:28 AM

## 2021-05-10 NOTE — H&P
"   History and Physical      Patient Name: Suzanne Gutierrez   Patient ID: [id number]   Sex: Female   YOB: 1950    Primary Care Provider: Chelita Simpson MD   Referring Provider: Juany Palencia MD    Visit Date: October 16, 2020    Provider: Radha Farias PA-C   Location: [facility]   Location Address: [address] [place] [address]  [id number]   Location Phone: [phone]          Chief Complaint  · Left knee pain      History Of Present Illness  Suzanne Gutierrez is a 70 year old /White female who presents today to [facility].      Patient is here for left knee injury sustained from falling in the shower twisting the left knee. Patient states moderate amount of pain and swelling on the medial side of the knee. Patient states knee feels like it will \"give away\".       Past Medical History  ADHD (attention deficit hyperactivity disorder); Chronic Obstructive Pulmonary Disease; Conjunctivitis; COPD (chronic obstructive pulmonary disease); Dyspnea; Hearing loss; Hoarseness; Insomnia; Leg pain; Leg swelling; Limb Swelling; Lung disease; Muscle cramps; Myofacial muscle pain; Otalgia; Peripheral neuropathy; Pneumonia; Pulmonary fibrosis; Recurrent otitis media; Sinus congestion/allergies; Sleep apnea; Tinnitus, right; Vertigo; Vocal cord dysfunction         Past Surgical History  Appendectomy; Colonoscopy; Gallbladder; Hand surgery; Hysterectomy; Tonsilectomy         Medication List  amitriptyline 100 mg oral tablet; diclofenac sodium 75 mg oral tablet,delayed release (DR/EC); meclizine 25 mg oral tablet         Allergy List  Excedrin Migraine; Keflex         Family Medical History  Heart Disease; Cancer, Unspecified; Breast Neoplasm; Melanoma; Diabetes; Family history of cancer; Family history of heart disease         Social History  Alcohol (Never); Alcohol Use (Never); ; lives with children; Recreational Drug Use (Never); Tobacco (Former); Working         Review of " "Systems  · Constitutional  o Denies  o : fever, chills, weight loss  · Cardiovascular  o Denies  o : chest pain, shortness of breath  · Gastrointestinal  o Denies  o : liver disease, heartburn, nausea, blood in stools  · Genitourinary  o Denies  o : painful urination, blood in urine  · Integument  o Denies  o : rash, itching  · Neurologic  o Denies  o : headache, weakness, loss of consciousness  · Musculoskeletal  o Denies  o : painful, swollen joints  · Psychiatric  o Denies  o : drug/alcohol addiction, anxiety, depression      Vitals  Date Time BP Position Site L\R Cuff Size HR RR TEMP (F) WT  HT  BMI kg/m2 BSA m2 O2 Sat FR L/min FiO2 HC       10/16/2020 02:20 PM      102 - R   211lbs 0oz 5'  4\" 36.22 2.08 95 %            Physical Examination  · Constitutional  o Appearance  o : well developed, well-nourished, no obvious deformities present  · Head and Face  o Head  o :   § Inspection  § : normocephalic  o Face  o :   § Inspection  § : no facial lesions  · Eyes  o Conjunctivae  o : conjunctivae normal  o Sclerae  o : sclerae white  · Ears, Nose, Mouth and Throat  o Ears  o :   § External Ears  § : appearance within normal limits  § Hearing  § : intact  o Nose  o :   § External Nose  § : appearance normal  · Neck  o Inspection/Palpation  o : normal appearance  o Range of Motion  o : full range of motion  · Respiratory  o Respiratory Effort  o : breathing unlabored  o Inspection of Chest  o : normal appearance  o Auscultation of Lungs  o : no audible wheezing or rales  · Cardiovascular  o Heart  o : regular rate  · Gastrointestinal  o Abdominal Examination  o : soft and non-tender  · Skin and Subcutaneous Tissue  o General Inspection  o : intact, no rashes  · Psychiatric  o General  o : Alert and oriented x3  o Judgement and Insight  o : judgment and insight intact  o Mood and Affect  o : mood normal, affect appropriate  · In Office Procedures  o View  o : LAT/SUNRISE/STANDING   o Site  o : left, " knee  o Indication  o : Left knee pain   o Study  o : X-rays ordered, taken in the office, and reviewed today.  o Xray  o : negative fracture or dislocation  o Comparative Data  o : No comparative data found  · Left Knee-Street  o Inspection  o : limping gait, weight bearing, swelling present, no ecchymosis, no atrophy, neutral alignment  o Palpation  o : tenderness at medial joint line, no lateral joint line tenderness, no patellar tendon tenderness, no pain of MCL, no pain at LCL  o ROM  o : full extension, full flexion  o Strength  o : full extension, full flexion  o Special Tests  o : negative ballotable effusion , negtive fluid wave, negative patellar compression, negative patellar apprehenison, positive Bruce's test, positive Apley's test, negative anterior drawer, negative posterior drawer, negative lachman's drawer , negative varus stress, negaitve valgus stress  o Neurovascular  o : Full sensation, Dorsal Pedal Pulse 2+, posteriror tibialis pulse 2+          Assessment  · Pain: Knee     719.46/M25.569  · Knee injury     959.7/S89.90XA      Plan  · Orders  o Knee (Left) [facility] Preferred View (51918-MM) - 719.46/M25.569 - 10/16/2020  · Medications  o Medications have been Reconciled  o Transition of Care or Provider Policy  · Instructions  o Reviewed the patient's Past Medical, Social, and Family history as well as the ROS at today's visit, no changes.  o Call or return if worsening symptoms.  o Exercise handout given.  o X-ray ordered, taken and reviewed at this visit.  o Follow Up in 4 weeks.   o Electronically Identified Patient Education Materials Provided Electronically     Prescribed Tramadol 50mg one po q 6 hours PRN #25  Prescribed Voltaren 75mg one po BID #60 with food  If failing to improve order left knee MRI             Electronically Signed by: TERESE Dang-WAYNE -Author on October 16, 2020 03:26:13 PM  Electronically Co-signed by: Nicola Langford MD -Reviewer on October 16, 2020 09:02:17 PM

## 2021-05-13 NOTE — PROGRESS NOTES
Progress Note      Patient Name: Suzanne Gutierrez   Patient ID: [id number]   Sex: Female   YOB: 1950    Primary Care Provider: Chelita Simpson MD   Referring Provider: Juany Palencia MD    Visit Date: July 15, 2020    Provider: Radha Farias PA-C   Location: [facility]   Location Address: [address] [place] [address]  [id number]   Location Phone: [phone]          Chief Complaint  · Right knee pain      History Of Present Illness  Suzanne Gutierrez is a 70 year old /White female who presents today to [facility].      Patient is status post right knee arthroscopic partial lateral meniscectomy, chondroplasty of medial femoral condyle performed 7/2/20 by Dr. Langford. Patient states moderate pain in right knee. Patient denies calf pain. Patient is using a cane.       Past Medical History  ADHD (attention deficit hyperactivity disorder); Chronic Obstructive Pulmonary Disease; Conjunctivitis; COPD (chronic obstructive pulmonary disease); Dyspnea; Hearing loss; Hoarseness; Insomnia; Leg pain; Leg swelling; Limb Swelling; Lung disease; Muscle cramps; Myofacial muscle pain; Otalgia; Peripheral neuropathy; Pneumonia; Pulmonary fibrosis; Recurrent otitis media; Sinus congestion/allergies; Sleep apnea; Tinnitus, right; Vertigo; Vocal cord dysfunction         Past Surgical History  Appendectomy; Colonoscopy; Gallbladder; Hand surgery; Hysterectomy; Tonsilectomy         Medication List  amitriptyline 100 mg oral tablet; meclizine 25 mg oral tablet         Allergy List  Excedrin Migraine; Keflex         Family Medical History  Heart Disease; Cancer, Unspecified; Breast Neoplasm; Melanoma; Diabetes; Family history of cancer; Family history of heart disease         Social History  Alcohol (Never); Alcohol Use (Never); ; lives with children; Recreational Drug Use (Never); Tobacco (Former); Working         Review of Systems  · Constitutional  o Denies  o : fever, chills, weight  "loss  · Cardiovascular  o Denies  o : chest pain, shortness of breath  · Gastrointestinal  o Denies  o : liver disease, heartburn, nausea, blood in stools  · Genitourinary  o Denies  o : painful urination, blood in urine  · Integument  o Denies  o : rash, itching  · Neurologic  o Denies  o : headache, weakness, loss of consciousness  · Musculoskeletal  o Denies  o : painful, swollen joints  · Psychiatric  o Denies  o : drug/alcohol addiction, anxiety, depression      Vitals  Date Time BP Position Site L\R Cuff Size HR RR TEMP (F) WT  HT  BMI kg/m2 BSA m2 O2 Sat        07/15/2020 12:55 PM      101 - R   217lbs 0oz 5'  4\" 37.25 2.11 90 %          Physical Examination  · Constitutional  o Appearance  o : well developed, well-nourished, no obvious deformities present  · Head and Face  o Head  o :   § Inspection  § : normocephalic  o Face  o :   § Inspection  § : no facial lesions  · Eyes  o Conjunctivae  o : conjunctivae normal  o Sclerae  o : sclerae white  · Ears, Nose, Mouth and Throat  o Ears  o :   § External Ears  § : appearance within normal limits  § Hearing  § : intact  o Nose  o :   § External Nose  § : appearance normal  · Neck  o Inspection/Palpation  o : normal appearance  o Range of Motion  o : full range of motion  · Respiratory  o Respiratory Effort  o : breathing unlabored  o Inspection of Chest  o : normal appearance  o Auscultation of Lungs  o : no audible wheezing or rales  · Cardiovascular  o Heart  o : regular rate  · Gastrointestinal  o Abdominal Examination  o : soft and non-tender  · Skin and Subcutaneous Tissue  o General Inspection  o : intact, no rashes  · Psychiatric  o General  o : Alert and oriented x3  o Judgement and Insight  o : judgment and insight intact  o Mood and Affect  o : mood normal, affect appropriate  · Right Knee-Street  o Inspection  o : incisions well healed without signs of infection, limping gait, weight bearing, swelling, no ecchymosis, no atrophy, neutral " alignment  o Palpation  o : no medial joint line tenderness, no lateral joint line tenderness, no patellar tendon tenderness, no pain of MCL, no pain at LCL  o ROM  o : full extension, full flexion  o Strength  o : weak extension, weak flexion   o Special Tests  o : negative Nisha's sign  o Neurovascular  o : Full sensation, Dorsal Pedal Pulse 2+, posteriror tibialis pulse 2+          Assessment  · Aftercare following surgery of the muskuloskeletal system     V54.81  · Right knee pain, unspecified chronicity     719.46/M25.561      Plan  · Medications  o Medications have been Reconciled  o Transition of Care or Provider Policy  · Instructions  o Sutures removed in clinic today.  o Reviewed the patient's Past Medical, Social, and Family history as well as the ROS at today's visit, no changes.  o Call or return if worsening symptoms.  o Follow Up in 4 weeks.   o Electronically Identified Patient Education Materials Provided Electronically     Prescribed physical therapy at [facility]  Prescribed Norco 7.5/325mg one po q 6 hours prn #30             Electronically Signed by: CHRISTINE DangC -Author on July 15, 2020 01:05:09 PM

## 2021-05-13 NOTE — PROGRESS NOTES
Progress Note      Patient Name: Suzanne Gutierrez   Patient ID: [id number]   Sex: Female   YOB: 1950    Primary Care Provider: Chelita Simpson MD   Referring Provider: Juany Palencia MD    Visit Date: June 26, 2020    Provider: Radha Farias PA-C   Location: [facility]   Location Address: [address] [place] [address]  [id number]   Location Phone: [phone]          Chief Complaint  · Follow up right knee pain      History Of Present Illness  Suzanne Gutierrez is a 70 year old /White female who presents today to [facility].      Patient is following up for right knee pain. Patient states pain on the medial and lateral sides of the knee. Patient states steroid injection provided relief for 1 week. Patient states pain is causing a limping gait. Patient states right knee will lock at times.            Past Medical History  ADHD (attention deficit hyperactivity disorder); Chronic Obstructive Pulmonary Disease; Conjunctivitis; COPD (chronic obstructive pulmonary disease); Dyspnea; Hearing loss; Hoarseness; Insomnia; Leg pain; Leg swelling; Limb Swelling; Lung disease; Muscle cramps; Myofacial muscle pain; Otalgia; Peripheral neuropathy; Pneumonia; Pulmonary fibrosis; Recurrent otitis media; Sinus congestion/allergies; Sleep apnea; Tinnitus, right; Vertigo; Vocal cord dysfunction         Past Surgical History  Appendectomy; Colonoscopy; Gallbladder; Hand surgery; Hysterectomy; Tonsilectomy         Medication List  amitriptyline 100 mg oral tablet; meclizine 25 mg oral tablet; prednisone 10 mg oral tablet         Allergy List  Excedrin Migraine; Keflex         Family Medical History  Heart Disease; Cancer, Unspecified; Breast Neoplasm; Melanoma; Diabetes; Family history of cancer; Family history of heart disease         Social History  Alcohol (Never); Alcohol Use (Never); ; lives with children; Recreational Drug Use (Never); Tobacco (Former); Working         Review of  "Systems  · Constitutional  o Denies  o : fever, chills, weight loss  · Cardiovascular  o Denies  o : chest pain, shortness of breath  · Gastrointestinal  o Denies  o : liver disease, heartburn, nausea, blood in stools  · Genitourinary  o Denies  o : painful urination, blood in urine  · Integument  o Denies  o : rash, itching  · Neurologic  o Denies  o : headache, weakness, loss of consciousness  · Musculoskeletal  o Denies  o : painful, swollen joints  · Psychiatric  o Denies  o : drug/alcohol addiction, anxiety, depression      Vitals  Date Time BP Position Site L\R Cuff Size HR RR TEMP (F) WT  HT  BMI kg/m2 BSA m2 O2 Sat HC       06/26/2020 01:15 PM      101 - R   212lbs 0oz 5'  4\" 36.39 2.08 98 %          Physical Examination  · Constitutional  o Appearance  o : well developed, well-nourished, no obvious deformities present  · Head and Face  o Head  o :   § Inspection  § : normocephalic  o Face  o :   § Inspection  § : no facial lesions  · Eyes  o Conjunctivae  o : conjunctivae normal  o Sclerae  o : sclerae white  · Ears, Nose, Mouth and Throat  o Ears  o :   § External Ears  § : appearance within normal limits  § Hearing  § : intact  o Nose  o :   § External Nose  § : appearance normal  · Neck  o Inspection/Palpation  o : normal appearance  o Range of Motion  o : full range of motion  · Respiratory  o Respiratory Effort  o : breathing unlabored  o Inspection of Chest  o : normal appearance  o Auscultation of Lungs  o : no audible wheezing or rales  · Cardiovascular  o Heart  o : regular rate  · Gastrointestinal  o Abdominal Examination  o : soft and non-tender  · Skin and Subcutaneous Tissue  o General Inspection  o : intact, no rashes  · Psychiatric  o General  o : Alert and oriented x3  o Judgement and Insight  o : judgment and insight intact  o Mood and Affect  o : mood normal, affect appropriate  · Imaging  o Imaging  o : Right Knee MRI 6/19/20: 1. Tears of the medial and lateral menisci. 2. Mild to " moderate osteoarthritic changes. 3. Moderate joint effusion. 4. 4.5 cm popliteal cyst with evidence of cyst leak or rupture.  · Right Knee-Street  o Inspection  o : no limping gait, weight bearing, no swelling, no ecchymosis, no atrophy, neutral alignment  o Palpation  o : tenderness at medial joint line, tenderness at lateral joint line, no patellar tendon tenderness, no pain of MCL, no pain at LCL  o ROM  o : full extension, full flexion  o Strength  o : full extension, full flexion  o Special Tests  o : negative ballotable effusion , negtive fluid wave, negative patellar compression, negative patellar apprehenison, positive Bruce's test, positive Apley's test, negative anterior drawer, negative posterior drawer, negative lachman's drawer , negative varus stress, negaitve valgus stress  o Neurovascular  o : Full sensation, Dorsal Pedal Pulse 2+, posteriror tibialis pulse 2+          Assessment  · Primary osteoarthritis of right knee     715.16/M17.11  · Lateral meniscus tear     836.1/S83.289A  · MMT (medial meniscus tear)     836.0/S83.249A  · Pain: Knee     719.46/M25.569      Plan  · Medications  o Medications have been Reconciled  o Transition of Care or Provider Policy  · Instructions  o  Been saw and examined the patient and agrees with plan.   o Reviewed the patient's Past Medical, Social, and Family history as well as the ROS at today's visit, no changes.  o Call or return if worsening symptoms.  o Discussed surgery.  o Risks/benefits discussed with patient including, but not limited to: infection, bleeding, neurovascular damage, re-rupture, aesthetic deformity, need for further surgery, and death.  o Surgery pamphlet given.  o Electronically Identified Patient Education Materials Provided Electronically            Electronically Signed by: Radha Farias PA-C -Author on June 26, 2020 03:02:53 PM

## 2021-05-13 NOTE — PROGRESS NOTES
"   Progress Note      Patient Name: Suzanne Gutierrez   Patient ID: [id number]   Sex: Female   YOB: 1950    Primary Care Provider: Chelita Simpson MD   Referring Provider: Juany Palencia MD    Visit Date: October 27, 2020    Provider: ALBAN Hay   Location: [facility]   Location Address: [address] [place] [address]  [id number]   Location Phone: [phone]          Chief Complaint  · Follow Up Exam      History Of Present Illness  Suzanne Gutierrez is a 70 year old /White female who presents today to [facility] today referred from Juany Palencia MD. States she had a very severe argument with her daughter and family said she didn't recognize anyone and was \"just staring\". Amnesic to 2 days around the event. Cried a lot. Has had some headaches since but no other spells of amnesia. Does have a history of headaches. Prior to the event headaches were very intermittent, Aleve would be effective in aborting the headache. States she's had 2 headaches since this event. Was seen at [facility] for r/o stroke. No stroke found. No previous history of seizure. Denies urinary incontinence or tongue biting during that spell. No recurrent spell. Is doing well.   Suzanne Gutierrez is a 70 year old /White female who presents today to [facility] today for a follow up exam. Denies recurrent seizure-like spells since previous visit. States she is doing well. Reviewed EEG results.      Record Review from [facility]     CTA Head/Neck  - No hemodynamically significant stenosis or large vessel occlusion    MRI Brain  - Extremely limited exam due to patient inability to tolerate.  Given these limitations, no evidence of acute or subacute infarction is demonstrated on diffusion-weighted sequences.    EEG:   Abnormal EEG because of:  1.  Intermittent low to medium voltage single sharp wave discharges noted  alternating in the left frontal region and the left frontal " temporal region  suggestive of neuronal irritability and/or epileptogenic activities in this  region.  2.  Neuro radiographic imaging may be of some help to rule out pathology in  the left brain.       Past Medical History  ADHD (attention deficit hyperactivity disorder); Chronic Obstructive Pulmonary Disease; Conjunctivitis; COPD (chronic obstructive pulmonary disease); Dyspnea; Hearing loss; Hoarseness; Insomnia; Leg pain; Leg swelling; Limb Swelling; Lung disease; Muscle cramps; Myofacial muscle pain; Otalgia; Peripheral neuropathy; Pneumonia; Pulmonary fibrosis; Recurrent otitis media; Sinus congestion/allergies; Sleep apnea; Tinnitus, right; Vertigo; Vocal cord dysfunction         Past Surgical History  Appendectomy; Colonoscopy; Gallbladder; Hand surgery; Hysterectomy; Tonsilectomy         Medication List  amitriptyline 100 mg oral tablet; diclofenac sodium 75 mg oral tablet,delayed release (DR/EC); meclizine 25 mg oral tablet; Ultram 50 mg oral tablet         Allergy List  Excedrin Migraine; Keflex       Allergies Reconciled  Family Medical History  Heart Disease; Cancer, Unspecified; Breast Neoplasm; Melanoma; Diabetes; Family history of cancer; Family history of heart disease         Social History  Alcohol (Never); Alcohol Use (Never); ; lives with children; Recreational Drug Use (Never); Tobacco (Former); Working         Review of Systems  · Constitutional  o Denies  o : chills, excessive sweating, fatigue, fever, sycope/passing out, weight gain, weight loss  · Eyes  o Denies  o : changes in vision, blurry vision, double vision  · HENT  o Admits  o : seasonal allergies  o Denies  o : loss of hearing, ringing in the ears, ear aches, sore throat, nasal congestion, sinus pain, nose bleeds  · Cardiovascular  o Admits  o : swollen legs  o Denies  o : blood clots, anemia, easy burising or bleeding, transfusions  · Respiratory  o Admits  o : shortness of breath, dry cough  o Denies  o : productive cough,  "pneumonia, COPD  · Gastrointestinal  o Denies  o : difficulty swallowing, reflux  · Genitourinary  o Denies  o : incontinence  · Neurologic  o Admits  o : loss of balance, dizziness/vertigo  o Denies  o : headache, seizure, stroke, tremor, falls, difficulty with sleep, numbness/tingling/paresthesia , difficulty with coordination, difficulty with dexterity, weakness  · Musculoskeletal  o Denies  o : neck stiffness/pain, swollen lymph nodes, muscle aches, joint pain, weakness, spasms, sciatica, pain radiating in arm, pain radiating in leg, low back pain  · Endocrine  o Denies  o : diabetes, thyroid disorder  · Psychiatric  o Denies  o : anxiety, depression      Vitals  Date Time BP Position Site L\R Cuff Size HR RR TEMP (F) WT  HT  BMI kg/m2 BSA m2 O2 Sat FR L/min FiO2 HC       10/27/2020 02:46 /73 Sitting    74 - R   207lbs 0oz 5'  4\" 35.53 2.06             Physical Examination  · Constitutional  o Appearance  o : well-nourished, well groomed, in no apparent distress  · Eyes  o Pupils and Irises  o : Pupils equal, round, and reactive to light and accommodation bilaterally  · Respiratory  o Auscultation of Lungs  o : Lungs were clear to ascultation bilaterally. No wheezes, rhonchi or rales were appreciated.  · Cardiovascular  o Heart  o :   § Auscultation of Heart  § : Regular rate and rhythm, no murmurs, gallops or rubs were appreciated.  o Peripheral Vascular System  o :   § Extremities  § : No peripheral edema was appreciated  · Musculoskeletal  o General  o : Normal bulk and normal tone throughout. 5/5 motor strength throughout and symmetric. Normal gait and station.  · Neurologic  o Mental Status Examination  o :   § Orientation  § : Alert and oriented to person, place, and time,  § Speech/Language  § : Intact naming, comprehension, and repetition. No dysarthria.  § Memory  § : Immediate recall is 3/3. Recall at 5 minutes is 3/3.   § Attention  § : Attention span is intact to serial 7 examination and finger " tapping.   § Fund of Knowledge  § : Adequate fund of knowledge  o Cranial Nerves  o : Pupils are equal, round and reactive to light. Extraocular movements are intact. Visual fields are full. Fundoscopic examination reveals sharp disc bilaterally. Sensation in the V1-V3 distribution is intact and symmetric. Muscles of mastication are strong and symmetric. Muscles of facial expression are strong and symmetric. Hearing is intact. Palatal raise is intact and symmetric. Uvula is midline. Shoulder shrug is strong. Tongue protrudes in the midline.  o Motor Examination  o :   § RUE Strength  § : strength normal  § LUE Strength  § : strength normal  § RLE Strength  § : strength normal  § LLE Strength  § : strength normal  o Reflexes  o : 2+ reflexes throughout and symmetric. Negative Ibarra. Negative Babinski.   o Sensation  o : Intact sensation to light touch, pinprick, vibration and proprioception throughout.  o Gait and Station  o :   § Gait Screening  § : Normal, narrow based gait, with a normal arm swing.  o Cerebellar Function  o : intact finger to nose and heel to shin. Rapid alternating movements are intact in the upper and lower extremities.   o Coordination  o : Intact finger to nose and heel to shin. Rapid alternating movements are intact in the upper and lower extremities.          Assessment  · Seizures     780.39/R56.9  EEG showed abnormal waveforms in left frontal/temporal lobe that can be indicative of seizure activity. Will start Keppra for prophylactic therapy. Will order MRI brain to ensure no structural abnormality in that area of the brain. Discussed routine seizure precautions. She is aware of [place] state laws regarding no driving for 90 days after a seizure.      Plan  · Orders  o MRI brain wo then w contrast (33219) - 780.39/R56.9 - 10/27/2020  · Medications  o Keppra 500 mg oral tablet   SIG: take 1 tablet (500 mg) by oral route 2 times per day for 30 days   DISP: (60) Tablet with 3 refills  Prescribed  on 10/27/2020     o Medications have been Reconciled  o Transition of Care or Provider Policy  · Instructions  o Call or Return if symptoms worsen or persist.   o Follow up in 3 months.  · Disposition  o Call or Return if symptoms worsen or persist.            Electronically Signed by: Yasmeen Matamoros APRN -Author on October 29, 2020 08:22:51 AM

## 2021-05-13 NOTE — PROGRESS NOTES
Progress Note      Patient Name: Suzanne Gutierrez   Patient ID: [id number]   Sex: Female   YOB: 1950    Primary Care Provider: Chelita Simpson MD   Referring Provider: Juany Palencia MD    Visit Date: August 19, 2020    Provider: Radha Farias PA-C   Location: [facility]   Location Address: [address] [place] [address]  [id number]   Location Phone: [phone]          Chief Complaint  · right knee pain      History Of Present Illness  Suzanne Gutierrez is a 70 year old /White female who presents today to [facility].      Patient is status post right knee arthroscopic partial lateral meniscectomy, chondroplasty of medial femoral condyle performed 7/2/20 by Dr. Langford. Patient denies pain in right knee. Patient is very pleased with the outcome of surgery.       Past Medical History  ADHD (attention deficit hyperactivity disorder); Chronic Obstructive Pulmonary Disease; Conjunctivitis; COPD (chronic obstructive pulmonary disease); Dyspnea; Hearing loss; Hoarseness; Insomnia; Leg pain; Leg swelling; Limb Swelling; Lung disease; Muscle cramps; Myofacial muscle pain; Otalgia; Peripheral neuropathy; Pneumonia; Pulmonary fibrosis; Recurrent otitis media; Sinus congestion/allergies; Sleep apnea; Tinnitus, right; Vertigo; Vocal cord dysfunction         Past Surgical History  Appendectomy; Colonoscopy; Gallbladder; Hand surgery; Hysterectomy; Tonsilectomy         Medication List  amitriptyline 100 mg oral tablet; meclizine 25 mg oral tablet         Allergy List  Excedrin Migraine; Keflex         Family Medical History  Heart Disease; Cancer, Unspecified; Breast Neoplasm; Melanoma; Diabetes; Family history of cancer; Family history of heart disease         Social History  Alcohol (Never); Alcohol Use (Never); ; lives with children; Recreational Drug Use (Never); Tobacco (Former); Working         Review of Systems  · Constitutional  o Denies  o : fever, chills, weight  "loss  · Cardiovascular  o Denies  o : chest pain, shortness of breath  · Gastrointestinal  o Denies  o : liver disease, heartburn, nausea, blood in stools  · Genitourinary  o Denies  o : painful urination, blood in urine  · Integument  o Denies  o : rash, itching  · Neurologic  o Denies  o : headache, weakness, loss of consciousness  · Musculoskeletal  o Denies  o : painful, swollen joints  · Psychiatric  o Denies  o : drug/alcohol addiction, anxiety, depression      Vitals  Date Time BP Position Site L\R Cuff Size HR RR TEMP (F) WT  HT  BMI kg/m2 BSA m2 O2 Sat        08/19/2020 02:10 PM      87 - R   203lbs 16oz 5'  4\" 35.02 2.04 98 %          Physical Examination  · Constitutional  o Appearance  o : well developed, well-nourished, no obvious deformities present  · Head and Face  o Head  o :   § Inspection  § : normocephalic  o Face  o :   § Inspection  § : no facial lesions  · Eyes  o Conjunctivae  o : conjunctivae normal  o Sclerae  o : sclerae white  · Ears, Nose, Mouth and Throat  o Ears  o :   § External Ears  § : appearance within normal limits  § Hearing  § : intact  o Nose  o :   § External Nose  § : appearance normal  · Neck  o Inspection/Palpation  o : normal appearance  o Range of Motion  o : full range of motion  · Respiratory  o Respiratory Effort  o : breathing unlabored  o Inspection of Chest  o : normal appearance  o Auscultation of Lungs  o : no audible wheezing or rales  · Cardiovascular  o Heart  o : regular rate  · Gastrointestinal  o Abdominal Examination  o : soft and non-tender  · Skin and Subcutaneous Tissue  o General Inspection  o : intact, no rashes  · Psychiatric  o General  o : Alert and oriented x3  o Judgement and Insight  o : judgment and insight intact  o Mood and Affect  o : mood normal, affect appropriate  · Right Knee-Street  o Inspection  o : scars well healed, no limping gait, weight bearing, no swelling, no ecchymosis, no atrophy, neutral alignment  o Palpation  o : no " medial joint line tenderness, no lateral joint line tenderness, no patellar tendon tenderness, no pain of MCL, no pain at LCL  o ROM  o : full extension, full flexion  o Strength  o : full extension, full flexion  o Neurovascular  o : Full sensation, Dorsal Pedal Pulse 2+, posteriror tibialis pulse 2+          Assessment  · Aftercare following surgery of the muskuloskeletal system     V54.81  · Right knee pain, unspecified chronicity     719.46/M25.561      Plan  · Medications  o Medications have been Reconciled  o Transition of Care or Provider Policy  · Instructions  o Reviewed the patient's Past Medical, Social, and Family history as well as the ROS at today's visit, no changes.  o Call or return if worsening symptoms.  o Follow Up PRN.  o Electronically Identified Patient Education Materials Provided Electronically            Electronically Signed by: Radha Farias PA-C -Author on August 19, 2020 02:34:56 PM

## 2021-05-14 VITALS
WEIGHT: 214 LBS | DIASTOLIC BLOOD PRESSURE: 70 MMHG | HEIGHT: 64 IN | SYSTOLIC BLOOD PRESSURE: 131 MMHG | TEMPERATURE: 97.9 F | HEART RATE: 100 BPM | BODY MASS INDEX: 36.54 KG/M2

## 2021-05-14 VITALS
WEIGHT: 207 LBS | HEART RATE: 74 BPM | HEIGHT: 64 IN | DIASTOLIC BLOOD PRESSURE: 73 MMHG | SYSTOLIC BLOOD PRESSURE: 128 MMHG | BODY MASS INDEX: 35.34 KG/M2

## 2021-05-14 VITALS
BODY MASS INDEX: 37.22 KG/M2 | HEIGHT: 64 IN | WEIGHT: 218 LBS | DIASTOLIC BLOOD PRESSURE: 76 MMHG | HEART RATE: 84 BPM | SYSTOLIC BLOOD PRESSURE: 134 MMHG

## 2021-05-14 VITALS — OXYGEN SATURATION: 95 % | BODY MASS INDEX: 36.02 KG/M2 | HEIGHT: 64 IN | WEIGHT: 211 LBS | HEART RATE: 102 BPM

## 2021-05-14 NOTE — PROGRESS NOTES
"   Progress Note      Patient Name: Suzanne Gutierrez   Patient ID: [id number]   Sex: Female   YOB: 1950    Primary Care Provider: Chelita Simpson MD   Referring Provider: Juany Palencia MD    Visit Date: January 27, 2021    Provider: ALBAN Hay   Location: [facility]   Location Address: [address] [place] [address]  [id number]   Location Phone: [phone]          Chief Complaint     Pt is here for a 3 month f/u       History Of Present Illness  Suzanne Gutierrez is a 70 year old /White female who presents today to [facility] today referred from Juany Palencia MD. States she had a very severe argument with her daughter and family said she didn't recognize anyone and was \"just staring\". Amnesic to 2 days around the event. Cried a lot. Has had some headaches since but no other spells of amnesia. Does have a history of headaches. Prior to the event headaches were very intermittent, Aleve would be effective in aborting the headache. States she's had 2 headaches since this event. Was seen at [facility] for r/o stroke. No stroke found. No previous history of seizure. Denies urinary incontinence or tongue biting during that spell. No recurrent spell. Is doing well.   Suzanne Gutierrez is a 70 year old /White female who presents today to [facility] today for a follow up exam. Denies recurrent seizure-like spells since previous visit. Does report 2 spells of near syncope where she felt lightheaded and had blurry vision, but that resovled. Did not lose consciousness.      Record Review from [facility]     CTA Head/Neck  - No hemodynamically significant stenosis or large vessel occlusion    MRI Brain  - Extremely limited exam due to patient inability to tolerate.  Given these limitations, no evidence of acute or subacute infarction is demonstrated on diffusion-weighted sequences.    EEG:   Abnormal EEG because of:  1.  Intermittent low to medium " voltage single sharp wave discharges noted  alternating in the left frontal region and the left frontal temporal region  suggestive of neuronal irritability and/or epileptogenic activities in this  region.  2.  Neuro radiographic imaging may be of some help to rule out pathology in  the left brain.       Past Medical History  ADHD (attention deficit hyperactivity disorder); Chronic Obstructive Pulmonary Disease; Conjunctivitis; COPD (chronic obstructive pulmonary disease); Dyspnea; Hearing loss; Hoarseness; Insomnia; Leg pain; Leg swelling; Limb Swelling; Lung disease; Muscle cramps; Myofacial muscle pain; Otalgia; Peripheral neuropathy; Pneumonia; Pulmonary fibrosis; Recurrent otitis media; Sinus congestion/allergies; Sleep apnea; Tinnitus, right; Vertigo; Vocal cord dysfunction         Past Surgical History  Appendectomy; Colonoscopy; Gallbladder; Hand surgery; Hysterectomy; Tonsilectomy         Medication List  amitriptyline 100 mg oral tablet; diclofenac sodium 75 mg oral tablet,delayed release (DR/EC); Keppra 500 mg oral tablet; meclizine 25 mg oral tablet; Ultram 50 mg oral tablet         Allergy List  Excedrin Migraine; Keflex       Allergies Reconciled  Family Medical History  Heart Disease; Cancer, Unspecified; Breast Neoplasm; Melanoma; Diabetes; Family history of cancer; Family history of heart disease         Social History  Alcohol (Never); Alcohol Use (Never); ; lives with children; Recreational Drug Use (Never); Tobacco (Former); Working         Review of Systems  · Constitutional  o Denies  o : chills, excessive sweating, fatigue, fever, sycope/passing out, weight gain, weight loss  · Eyes  o Denies  o : changes in vision, blurry vision, double vision  · HENT  o Admits  o : seasonal allergies  o Denies  o : loss of hearing, ringing in the ears, ear aches, sore throat, nasal congestion, sinus pain, nose bleeds  · Cardiovascular  o Admits  o : swollen legs  o Denies  o : blood clots, anemia,  "easy burising or bleeding, transfusions  · Respiratory  o Admits  o : shortness of breath, dry cough  o Denies  o : productive cough, pneumonia, COPD  · Gastrointestinal  o Denies  o : difficulty swallowing, reflux  · Genitourinary  o Denies  o : incontinence  · Neurologic  o Admits  o : loss of balance, dizziness/vertigo  o Denies  o : headache, seizure, stroke, tremor, falls, difficulty with sleep, numbness/tingling/paresthesia , difficulty with coordination, difficulty with dexterity, weakness  · Musculoskeletal  o Denies  o : neck stiffness/pain, swollen lymph nodes, muscle aches, joint pain, weakness, spasms, sciatica, pain radiating in arm, pain radiating in leg, low back pain  · Endocrine  o Denies  o : diabetes, thyroid disorder  · Psychiatric  o Denies  o : anxiety, depression      Vitals  Date Time BP Position Site L\R Cuff Size HR RR TEMP (F) WT  HT  BMI kg/m2 BSA m2 O2 Sat FR L/min FiO2        01/27/2021 02:25 /70 Sitting    100 - R  97.9 213lbs 16oz 5'  4\" 36.73 2.09             Physical Examination  · Constitutional  o Appearance  o : well-nourished, well groomed, in no apparent distress  · Eyes  o Pupils and Irises  o : Pupils equal, round, and reactive to light and accommodation bilaterally  · Respiratory  o Auscultation of Lungs  o : Lungs were clear to ascultation bilaterally. No wheezes, rhonchi or rales were appreciated.  · Cardiovascular  o Heart  o :   § Auscultation of Heart  § : Regular rate and rhythm, no murmurs, gallops or rubs were appreciated.  o Peripheral Vascular System  o :   § Extremities  § : No peripheral edema was appreciated  · Musculoskeletal  o General  o : Normal bulk and normal tone throughout. 5/5 motor strength throughout and symmetric. Normal gait and station.  · Neurologic  o Mental Status Examination  o :   § Orientation  § : Alert and oriented to person, place, and time,  § Speech/Language  § : Intact naming, comprehension, and repetition. No " dysarthria.  § Memory  § : Immediate recall is 3/3. Recall at 5 minutes is 3/3.   § Attention  § : Attention span is intact to serial 7 examination and finger tapping.   § Fund of Knowledge  § : Adequate fund of knowledge  o Cranial Nerves  o : Pupils are equal, round and reactive to light. Extraocular movements are intact. Visual fields are full. Fundoscopic examination reveals sharp disc bilaterally. Sensation in the V1-V3 distribution is intact and symmetric. Muscles of mastication are strong and symmetric. Muscles of facial expression are strong and symmetric. Hearing is intact. Palatal raise is intact and symmetric. Uvula is midline. Shoulder shrug is strong. Tongue protrudes in the midline.  o Motor Examination  o :   § RUE Strength  § : strength normal  § LUE Strength  § : strength normal  § RLE Strength  § : strength normal  § LLE Strength  § : strength normal  o Reflexes  o : 2+ reflexes throughout and symmetric. Negative Ibarra. Negative Babinski.   o Sensation  o : Intact sensation to light touch, pinprick, vibration and proprioception throughout.  o Gait and Station  o :   § Gait Screening  § : Normal, narrow based gait, with a normal arm swing.  o Cerebellar Function  o : intact finger to nose and heel to shin. Rapid alternating movements are intact in the upper and lower extremities.   o Coordination  o : Intact finger to nose and heel to shin. Rapid alternating movements are intact in the upper and lower extremities.          Assessment  · Seizures     780.39/R56.9  EEG showed abnormal waveforms in left frontal/temporal lobe that can be indicative of seizure activity. Will continue Keppra. Discussed routine seizure precautions. She is aware of [place] state laws regarding no driving for 90 days after a seizure.      Plan  · Medications  o Keppra 500 mg oral tablet   SIG: take 1 tablet (500 mg) by oral route 2 times per day for 30 days   DISP: (60) Tablet with 3 refills  Refilled on 01/27/2021      o Medications have been Reconciled  o Transition of Care or Provider Policy  · Disposition  o Call or Return if symptoms worsen or persist.            Electronically Signed by: ALABN Hay -Author on January 28, 2021 11:24:51 AM

## 2021-05-14 NOTE — PROGRESS NOTES
"   Progress Note      Patient Name: Suzanne Gutierrez   Patient ID: [id number]   Sex: Female   YOB: 1950    Primary Care Provider: Chelita Simpson MD   Referring Provider: Juany Palencia MD    Visit Date: April 27, 2021    Provider: ALBAN Hay   Location: [facility]   Location Address: [address] [place] [address]  [id number]   Location Phone: [phone]          Chief Complaint     3 month f/u       History Of Present Illness  Suzanne Gutierrez is a 70 year old /White female who presents today to [facility] today referred from Juany Palencia MD. States she had a very severe argument with her daughter and family said she didn't recognize anyone and was \"just staring\". Amnesic to 2 days around the event. Cried a lot. Has had some headaches since but no other spells of amnesia. Does have a history of headaches. Prior to the event headaches were very intermittent, Aleve would be effective in aborting the headache. States she's had 2 headaches since this event. Was seen at [facility] for r/o stroke. No stroke found. No previous history of seizure. Denies urinary incontinence or tongue biting during that spell. No recurrent spell. Is doing well.   Suzanne Gutierrez is a 70 year old /White female who presents today to [facility] today for a follow up exam. Denies recurrent seizure-like spells since previous visit. Doing well on Keppra. Denies side effects. Does state she was hospitalized with Covid in Feb but is doing better.      Record Review from [facility]     CTA Head/Neck  - No hemodynamically significant stenosis or large vessel occlusion    MRI Brain  - Extremely limited exam due to patient inability to tolerate.  Given these limitations, no evidence of acute or subacute infarction is demonstrated on diffusion-weighted sequences.    EEG:   Abnormal EEG because of:  1.  Intermittent low to medium voltage single sharp wave discharges " noted  alternating in the left frontal region and the left frontal temporal region  suggestive of neuronal irritability and/or epileptogenic activities in this  region.  2.  Neuro radiographic imaging may be of some help to rule out pathology in  the left brain.       Past Medical History  ADHD (attention deficit hyperactivity disorder); Chronic Obstructive Pulmonary Disease; Conjunctivitis; COPD (chronic obstructive pulmonary disease); Dyspnea; Hearing loss; Hoarseness; Insomnia; Leg pain; Leg swelling; Limb Swelling; Lung disease; Muscle cramps; Myofacial muscle pain; Otalgia; Peripheral neuropathy; Pneumonia; Pulmonary fibrosis; Recurrent otitis media; Sinus congestion/allergies; Sleep apnea; Tinnitus, right; Vertigo; Vocal cord dysfunction         Past Surgical History  Appendectomy; Colonoscopy; Gallbladder; Hand surgery; Hysterectomy; Tonsilectomy         Medication List  amitriptyline 100 mg oral tablet; diclofenac sodium 75 mg oral tablet,delayed release (DR/EC); Keppra 500 mg oral tablet; meclizine 25 mg oral tablet; Ultram 50 mg oral tablet         Allergy List  Excedrin Migraine; Keflex       Allergies Reconciled  Family Medical History  Heart Disease; Cancer, Unspecified; Breast Neoplasm; Melanoma; Diabetes; Family history of cancer; Family history of heart disease         Social History  Alcohol (Never); Alcohol Use (Never); ; lives with children; Recreational Drug Use (Never); Tobacco (Former); Working         Review of Systems  · Constitutional  o Denies  o : chills, excessive sweating, fatigue, fever, sycope/passing out, weight gain, weight loss  · Eyes  o Denies  o : changes in vision, blurry vision, double vision  · HENT  o Admits  o : seasonal allergies  o Denies  o : loss of hearing, ringing in the ears, ear aches, sore throat, nasal congestion, sinus pain, nose bleeds  · Cardiovascular  o Admits  o : swollen legs  o Denies  o : blood clots, anemia, easy burising or bleeding,  "transfusions  · Respiratory  o Admits  o : shortness of breath, dry cough  o Denies  o : productive cough, pneumonia, COPD  · Gastrointestinal  o Denies  o : difficulty swallowing, reflux  · Genitourinary  o Denies  o : incontinence  · Neurologic  o Admits  o : loss of balance, dizziness/vertigo  o Denies  o : headache, seizure, stroke, tremor, falls, difficulty with sleep, numbness/tingling/paresthesia , difficulty with coordination, difficulty with dexterity, weakness  · Musculoskeletal  o Denies  o : neck stiffness/pain, swollen lymph nodes, muscle aches, joint pain, weakness, spasms, sciatica, pain radiating in arm, pain radiating in leg, low back pain  · Endocrine  o Denies  o : diabetes, thyroid disorder  · Psychiatric  o Denies  o : anxiety, depression      Vitals  Date Time BP Position Site L\R Cuff Size HR RR TEMP (F) WT  HT  BMI kg/m2 BSA m2 O2 Sat FR L/min FiO2        04/27/2021 01:49 /76 Sitting    84 - R   218lbs 0oz 5'  4\" 37.42 2.11             Physical Examination  · Constitutional  o Appearance  o : well-nourished, well groomed, in no apparent distress  · Eyes  o Pupils and Irises  o : Pupils equal, round, and reactive to light and accommodation bilaterally  · Respiratory  o Auscultation of Lungs  o : Lungs were clear to ascultation bilaterally. No wheezes, rhonchi or rales were appreciated.  · Cardiovascular  o Heart  o :   § Auscultation of Heart  § : Regular rate and rhythm, no murmurs, gallops or rubs were appreciated.  o Peripheral Vascular System  o :   § Extremities  § : No peripheral edema was appreciated  · Musculoskeletal  o General  o : Normal bulk and normal tone throughout. 5/5 motor strength throughout and symmetric. Normal gait and station.  · Neurologic  o Mental Status Examination  o :   § Orientation  § : Alert and oriented to person, place, and time,  § Speech/Language  § : Intact naming, comprehension, and repetition. No dysarthria.  § Memory  § : Immediate recall is 3/3. " Recall at 5 minutes is 3/3.   § Attention  § : Attention span is intact to serial 7 examination and finger tapping.   § Fund of Knowledge  § : Adequate fund of knowledge  o Cranial Nerves  o : Pupils are equal, round and reactive to light. Extraocular movements are intact. Visual fields are full. Fundoscopic examination reveals sharp disc bilaterally. Sensation in the V1-V3 distribution is intact and symmetric. Muscles of mastication are strong and symmetric. Muscles of facial expression are strong and symmetric. Hearing is intact. Palatal raise is intact and symmetric. Uvula is midline. Shoulder shrug is strong. Tongue protrudes in the midline.  o Motor Examination  o :   § RUE Strength  § : strength normal  § LUE Strength  § : strength normal  § RLE Strength  § : strength normal  § LLE Strength  § : strength normal  o Reflexes  o : 2+ reflexes throughout and symmetric. Negative Ibarra. Negative Babinski.   o Sensation  o : Intact sensation to light touch, pinprick, vibration and proprioception throughout.  o Gait and Station  o :   § Gait Screening  § : Normal, narrow based gait, with a normal arm swing.  o Cerebellar Function  o : intact finger to nose and heel to shin. Rapid alternating movements are intact in the upper and lower extremities.   o Coordination  o : Intact finger to nose and heel to shin. Rapid alternating movements are intact in the upper and lower extremities.          Assessment  · Seizures     780.39/R56.9  EEG showed abnormal waveforms in left frontal/temporal lobe that can be indicative of seizure activity. Will continue Keppra. Discussed routine seizure precautions. She is aware of [place] state laws regarding no driving for 90 days after a seizure.      Plan  · Medications  o Keppra 500 mg oral tablet   SIG: take 1 tablet (500 mg) by oral route 2 times per day for 30 days   DISP: (60) Tablet with 3 refills  Refilled on 04/27/2021     o Medications have been Reconciled  o Transition of Care or  Provider Policy  · Instructions  o f/u 4 months  · Disposition  o Call or Return if symptoms worsen or persist.            Electronically Signed by: ALBAN Hay -Author on April 30, 2021 11:53:24 AM

## 2021-05-15 VITALS — HEIGHT: 64 IN | OXYGEN SATURATION: 94 % | BODY MASS INDEX: 36.79 KG/M2 | WEIGHT: 215.5 LBS | HEART RATE: 108 BPM

## 2021-05-15 VITALS — HEART RATE: 79 BPM | OXYGEN SATURATION: 97 % | WEIGHT: 209 LBS | BODY MASS INDEX: 35.68 KG/M2 | HEIGHT: 64 IN

## 2021-05-15 VITALS — HEART RATE: 87 BPM | HEIGHT: 64 IN | OXYGEN SATURATION: 98 % | BODY MASS INDEX: 34.83 KG/M2 | WEIGHT: 204 LBS

## 2021-05-15 VITALS
DIASTOLIC BLOOD PRESSURE: 84 MMHG | SYSTOLIC BLOOD PRESSURE: 133 MMHG | TEMPERATURE: 97.6 F | BODY MASS INDEX: 36.25 KG/M2 | HEART RATE: 97 BPM | HEIGHT: 64 IN | WEIGHT: 212.31 LBS

## 2021-05-15 VITALS — OXYGEN SATURATION: 90 % | BODY MASS INDEX: 37.05 KG/M2 | HEIGHT: 64 IN | WEIGHT: 217 LBS | HEART RATE: 101 BPM

## 2021-05-15 VITALS — HEART RATE: 101 BPM | OXYGEN SATURATION: 98 % | HEIGHT: 64 IN | BODY MASS INDEX: 36.19 KG/M2 | WEIGHT: 212 LBS

## 2021-05-22 ENCOUNTER — TRANSCRIBE ORDERS (OUTPATIENT)
Dept: ADMINISTRATIVE | Facility: HOSPITAL | Age: 71
End: 2021-05-22

## 2021-05-22 DIAGNOSIS — J44.9 CHRONIC OBSTRUCTIVE PULMONARY DISEASE, UNSPECIFIED COPD TYPE (HCC): Primary | ICD-10-CM

## 2021-06-11 DIAGNOSIS — R42 DIZZINESS AND GIDDINESS: ICD-10-CM

## 2021-06-11 RX ORDER — MECLIZINE HYDROCHLORIDE 25 MG/1
25 TABLET ORAL 3 TIMES DAILY
COMMUNITY
Start: 2021-05-26 | End: 2021-06-14 | Stop reason: SDUPTHER

## 2021-06-14 RX ORDER — MECLIZINE HYDROCHLORIDE 25 MG/1
TABLET ORAL
Qty: 30 TABLET | Refills: 0 | Status: SHIPPED | OUTPATIENT
Start: 2021-06-14 | End: 2021-07-06 | Stop reason: SDUPTHER

## 2021-06-17 ENCOUNTER — TELEPHONE (OUTPATIENT)
Dept: FAMILY MEDICINE CLINIC | Facility: CLINIC | Age: 71
End: 2021-06-17

## 2021-06-17 DIAGNOSIS — G47.00 INSOMNIA, UNSPECIFIED TYPE: Primary | ICD-10-CM

## 2021-06-17 RX ORDER — AMITRIPTYLINE HYDROCHLORIDE 100 MG/1
TABLET ORAL
COMMUNITY
End: 2021-06-17 | Stop reason: SDUPTHER

## 2021-06-17 RX ORDER — AMITRIPTYLINE HYDROCHLORIDE 100 MG/1
100 TABLET, FILM COATED ORAL NIGHTLY
Qty: 90 TABLET | Refills: 0 | Status: SHIPPED | OUTPATIENT
Start: 2021-06-17 | End: 2021-09-13 | Stop reason: SDUPTHER

## 2021-06-17 NOTE — TELEPHONE ENCOUNTER
Caller: Suzanne Gutierrez    Relationship: Self    Best call back number: [phone]    Medication needed:   AMITRUPTYLINE    When do you need the refill by: ASAP        Does the patient have less than a 3 day supply:  [x] Yes  [] No    What is the patient's preferred pharmacy: [address] [phone] [facility] [phone] [facility]

## 2021-07-06 DIAGNOSIS — R42 DIZZINESS AND GIDDINESS: ICD-10-CM

## 2021-07-06 RX ORDER — FLUTICASONE PROPIONATE 50 MCG
SPRAY, SUSPENSION (ML) NASAL
COMMUNITY
End: 2021-07-12

## 2021-07-06 RX ORDER — FAMOTIDINE 20 MG/1
TABLET, FILM COATED ORAL
COMMUNITY
End: 2021-07-12

## 2021-07-06 RX ORDER — METHYLPREDNISOLONE 4 MG/1
TABLET ORAL
COMMUNITY
Start: 2021-04-29 | End: 2021-07-08

## 2021-07-06 RX ORDER — INTERFERON GAMMA-1B 100 UG/.5ML
1 INJECTION, SOLUTION SUBCUTANEOUS
COMMUNITY
End: 2021-07-08

## 2021-07-06 RX ORDER — AZITHROMYCIN 250 MG/1
TABLET, FILM COATED ORAL
COMMUNITY
Start: 2021-04-29 | End: 2021-07-08

## 2021-07-06 RX ORDER — ASPIRIN 81 MG/1
TABLET, CHEWABLE ORAL
COMMUNITY

## 2021-07-06 RX ORDER — TRAMADOL HYDROCHLORIDE 50 MG/1
TABLET ORAL
COMMUNITY
Start: 2021-06-07 | End: 2021-07-12

## 2021-07-06 RX ORDER — BUDESONIDE AND FORMOTEROL FUMARATE DIHYDRATE 160; 4.5 UG/1; UG/1
AEROSOL RESPIRATORY (INHALATION)
COMMUNITY
End: 2021-08-23

## 2021-07-06 RX ORDER — HYDROCHLOROTHIAZIDE 12.5 MG/1
TABLET ORAL
COMMUNITY
Start: 2021-05-10 | End: 2021-07-07 | Stop reason: SDUPTHER

## 2021-07-06 RX ORDER — CLOTRIMAZOLE AND BETAMETHASONE DIPROPIONATE 10; .64 MG/G; MG/G
CREAM TOPICAL
COMMUNITY
Start: 2021-04-29 | End: 2021-11-01

## 2021-07-06 RX ORDER — LEVETIRACETAM 500 MG/1
TABLET ORAL
COMMUNITY
Start: 2021-06-11 | End: 2021-08-30 | Stop reason: SDUPTHER

## 2021-07-06 RX ORDER — ALBUTEROL SULFATE 90 UG/1
AEROSOL, METERED RESPIRATORY (INHALATION)
COMMUNITY
Start: 2021-06-26 | End: 2022-12-19 | Stop reason: SDUPTHER

## 2021-07-06 NOTE — TELEPHONE ENCOUNTER
Caller: Suzanne Gutierrez    Relationship: Self    Best call back number:   [phone]  Medication needed:   Requested Prescriptions     Pending Prescriptions Disp Refills   • meclizine (ANTIVERT) 25 MG tablet 30 tablet 0     HYDROCALIMADINE (WATER PILL)   preferred pharmacy: [address] [phone] [facility] [phone]

## 2021-07-07 DIAGNOSIS — R60.9 EDEMA, UNSPECIFIED TYPE: Primary | ICD-10-CM

## 2021-07-07 RX ORDER — MECLIZINE HYDROCHLORIDE 25 MG/1
25 TABLET ORAL 3 TIMES DAILY PRN
Qty: 30 TABLET | Refills: 2 | Status: SHIPPED | OUTPATIENT
Start: 2021-07-07 | End: 2021-08-21

## 2021-07-07 RX ORDER — HYDROCHLOROTHIAZIDE 12.5 MG/1
12.5 TABLET ORAL DAILY
Qty: 90 TABLET | Refills: 0 | Status: SHIPPED | OUTPATIENT
Start: 2021-07-07 | End: 2021-10-20 | Stop reason: SDUPTHER

## 2021-07-12 ENCOUNTER — OFFICE VISIT (OUTPATIENT)
Dept: FAMILY MEDICINE CLINIC | Facility: CLINIC | Age: 71
End: 2021-07-12

## 2021-07-12 ENCOUNTER — HOSPITAL ENCOUNTER (OUTPATIENT)
Dept: CT IMAGING | Facility: HOSPITAL | Age: 71
Discharge: HOME | End: 2021-07-12
Admitting: NURSE PRACTITIONER

## 2021-07-12 VITALS
DIASTOLIC BLOOD PRESSURE: 70 MMHG | BODY MASS INDEX: 34.93 KG/M2 | HEART RATE: 94 BPM | SYSTOLIC BLOOD PRESSURE: 128 MMHG | WEIGHT: 204.6 LBS | HEIGHT: 64 IN | OXYGEN SATURATION: 96 % | TEMPERATURE: 96.7 F

## 2021-07-12 DIAGNOSIS — R10.30 LOWER ABDOMINAL PAIN: ICD-10-CM

## 2021-07-12 DIAGNOSIS — R10.814 ABDOMINAL TENDERNESS OF LEFT LOWER QUADRANT, REBOUND TENDERNESS PRESENCE NOT SPECIFIED: ICD-10-CM

## 2021-07-12 DIAGNOSIS — R10.813 RIGHT LOWER QUADRANT ABDOMINAL TENDERNESS, REBOUND TENDERNESS PRESENCE NOT SPECIFIED: ICD-10-CM

## 2021-07-12 DIAGNOSIS — R10.30 LOWER ABDOMINAL PAIN: Primary | ICD-10-CM

## 2021-07-12 DIAGNOSIS — Z11.1 SCREENING-PULMONARY TB: ICD-10-CM

## 2021-07-12 PROBLEM — R42 VERTIGO: Status: ACTIVE | Noted: 2021-07-12

## 2021-07-12 PROBLEM — H66.90 RECURRENT OTITIS MEDIA: Status: ACTIVE | Noted: 2021-07-12

## 2021-07-12 PROBLEM — H92.09 OTALGIA: Status: ACTIVE | Noted: 2021-07-12

## 2021-07-12 PROBLEM — J01.80 OTHER ACUTE SINUSITIS: Status: ACTIVE | Noted: 2021-07-12

## 2021-07-12 PROBLEM — R49.0 HOARSENESS: Status: ACTIVE | Noted: 2021-07-12

## 2021-07-12 PROBLEM — G47.30 SLEEP APNEA: Status: ACTIVE | Noted: 2021-07-12

## 2021-07-12 PROBLEM — J84.10 FIBROSIS OF LUNG: Status: ACTIVE | Noted: 2021-07-12

## 2021-07-12 PROBLEM — J98.4 LUNG DISEASE: Status: ACTIVE | Noted: 2021-07-12

## 2021-07-12 PROBLEM — M79.606 LEG PAIN: Status: ACTIVE | Noted: 2021-07-12

## 2021-07-12 PROBLEM — R06.00 DYSPNEA: Status: ACTIVE | Noted: 2021-07-12

## 2021-07-12 LAB
ALBUMIN SERPL-MCNC: 4.4 G/DL (ref 3.5–5.2)
ALBUMIN/GLOB SERPL: 1.4 G/DL
ALP SERPL-CCNC: 60 U/L (ref 39–117)
ALT SERPL W P-5'-P-CCNC: 19 U/L (ref 1–33)
ANION GAP SERPL CALCULATED.3IONS-SCNC: 11.2 MMOL/L (ref 5–15)
AST SERPL-CCNC: 25 U/L (ref 1–32)
BASOPHILS # BLD AUTO: 0.06 10*3/MM3 (ref 0–0.2)
BASOPHILS NFR BLD AUTO: 0.8 % (ref 0–1.5)
BILIRUB SERPL-MCNC: 0.2 MG/DL (ref 0–1.2)
BUN SERPL-MCNC: 10 MG/DL (ref 8–23)
BUN/CREAT SERPL: 12.7 (ref 7–25)
CALCIUM SPEC-SCNC: 9.9 MG/DL (ref 8.6–10.5)
CHLORIDE SERPL-SCNC: 98 MMOL/L (ref 98–107)
CO2 SERPL-SCNC: 29.8 MMOL/L (ref 22–29)
CREAT SERPL-MCNC: 0.79 MG/DL (ref 0.57–1)
DEPRECATED RDW RBC AUTO: 38.5 FL (ref 37–54)
EOSINOPHIL # BLD AUTO: 0.31 10*3/MM3 (ref 0–0.4)
EOSINOPHIL NFR BLD AUTO: 4.3 % (ref 0.3–6.2)
ERYTHROCYTE [DISTWIDTH] IN BLOOD BY AUTOMATED COUNT: 12.4 % (ref 12.3–15.4)
GFR SERPL CREATININE-BSD FRML MDRD: 72 ML/MIN/1.73
GLOBULIN UR ELPH-MCNC: 3.1 GM/DL
GLUCOSE SERPL-MCNC: 106 MG/DL (ref 65–99)
HCT VFR BLD AUTO: 36.6 % (ref 34–46.6)
HGB BLD-MCNC: 12.4 G/DL (ref 12–15.9)
IMM GRANULOCYTES # BLD AUTO: 0.03 10*3/MM3 (ref 0–0.05)
IMM GRANULOCYTES NFR BLD AUTO: 0.4 % (ref 0–0.5)
LYMPHOCYTES # BLD AUTO: 2.08 10*3/MM3 (ref 0.7–3.1)
LYMPHOCYTES NFR BLD AUTO: 28.6 % (ref 19.6–45.3)
MCH RBC QN AUTO: 29.2 PG (ref 26.6–33)
MCHC RBC AUTO-ENTMCNC: 33.9 G/DL (ref 31.5–35.7)
MCV RBC AUTO: 86.3 FL (ref 79–97)
MONOCYTES # BLD AUTO: 0.66 10*3/MM3 (ref 0.1–0.9)
MONOCYTES NFR BLD AUTO: 9.1 % (ref 5–12)
NEUTROPHILS NFR BLD AUTO: 4.13 10*3/MM3 (ref 1.7–7)
NEUTROPHILS NFR BLD AUTO: 56.8 % (ref 42.7–76)
NRBC BLD AUTO-RTO: 0 /100 WBC (ref 0–0.2)
PLATELET # BLD AUTO: 300 10*3/MM3 (ref 140–450)
PMV BLD AUTO: 10.8 FL (ref 6–12)
POTASSIUM SERPL-SCNC: 3.4 MMOL/L (ref 3.5–5.2)
PROT SERPL-MCNC: 7.5 G/DL (ref 6–8.5)
RBC # BLD AUTO: 4.24 10*6/MM3 (ref 3.77–5.28)
SODIUM SERPL-SCNC: 139 MMOL/L (ref 136–145)
WBC # BLD AUTO: 7.27 10*3/MM3 (ref 3.4–10.8)

## 2021-07-12 PROCEDURE — 36415 COLL VENOUS BLD VENIPUNCTURE: CPT | Performed by: NURSE PRACTITIONER

## 2021-07-12 PROCEDURE — 80053 COMPREHEN METABOLIC PANEL: CPT | Performed by: NURSE PRACTITIONER

## 2021-07-12 PROCEDURE — 74176 CT ABD & PELVIS W/O CONTRAST: CPT

## 2021-07-12 PROCEDURE — 99213 OFFICE O/P EST LOW 20 MIN: CPT | Performed by: NURSE PRACTITIONER

## 2021-07-12 PROCEDURE — 85025 COMPLETE CBC W/AUTO DIFF WBC: CPT | Performed by: NURSE PRACTITIONER

## 2021-07-12 RX ORDER — INTERFERON GAMMA-1B 100 UG/.5ML
INJECTION, SOLUTION SUBCUTANEOUS
COMMUNITY
End: 2021-11-01

## 2021-07-12 RX ORDER — LEVOFLOXACIN 500 MG/1
TABLET, FILM COATED ORAL
COMMUNITY
End: 2021-07-12

## 2021-07-12 RX ORDER — IPRATROPIUM BROMIDE AND ALBUTEROL SULFATE 2.5; .5 MG/3ML; MG/3ML
SOLUTION RESPIRATORY (INHALATION)
COMMUNITY
End: 2023-01-12 | Stop reason: SDUPTHER

## 2021-07-12 RX ORDER — ZOSTER VACCINE RECOMBINANT, ADJUVANTED 50 MCG/0.5
KIT INTRAMUSCULAR
COMMUNITY
End: 2021-07-12

## 2021-07-12 RX ORDER — ACYCLOVIR 50 MG/G
CREAM TOPICAL
COMMUNITY
End: 2021-07-12

## 2021-07-12 RX ORDER — INFLUENZA VACCINE, ADJUVANTED 15; 15; 15 UG/.5ML; UG/.5ML; UG/.5ML
INJECTION, SUSPENSION INTRAMUSCULAR
COMMUNITY
End: 2021-07-12

## 2021-07-12 RX ORDER — ONDANSETRON 4 MG/1
TABLET, FILM COATED ORAL
COMMUNITY
End: 2021-07-12

## 2021-07-12 RX ORDER — DICLOFENAC SODIUM 75 MG/1
TABLET, DELAYED RELEASE ORAL
COMMUNITY
End: 2021-07-12

## 2021-07-12 RX ORDER — AZITHROMYCIN 250 MG/1
TABLET, FILM COATED ORAL
COMMUNITY
End: 2021-07-12

## 2021-07-12 RX ORDER — CARBAMAZEPINE 100 MG/1
TABLET, CHEWABLE ORAL
COMMUNITY
End: 2021-07-12 | Stop reason: ALTCHOICE

## 2021-07-12 RX ORDER — DOXYCYCLINE HYCLATE 100 MG/1
CAPSULE ORAL
COMMUNITY
End: 2021-07-12

## 2021-07-12 RX ORDER — PNEUMOCOCCAL VACCINE POLYVALENT 25; 25; 25; 25; 25; 25; 25; 25; 25; 25; 25; 25; 25; 25; 25; 25; 25; 25; 25; 25; 25; 25; 25 UG/.5ML; UG/.5ML; UG/.5ML; UG/.5ML; UG/.5ML; UG/.5ML; UG/.5ML; UG/.5ML; UG/.5ML; UG/.5ML; UG/.5ML; UG/.5ML; UG/.5ML; UG/.5ML; UG/.5ML; UG/.5ML; UG/.5ML; UG/.5ML; UG/.5ML; UG/.5ML; UG/.5ML; UG/.5ML; UG/.5ML
INJECTION, SOLUTION INTRAMUSCULAR; SUBCUTANEOUS
COMMUNITY
End: 2021-07-12

## 2021-07-12 NOTE — PROGRESS NOTES
Chief Complaint  Hip Pain (pelvic pain, Urgent Care on Thursday for pain, they wanted her to go to ER, she made apt for you.)    Subjective            Suzanne Gutierrez presents to [facility]  Pt here for the F/U from the acute care visit last week --last Thursday and pt with persistent lower abd pain--to the colon area--they did do a UA and was good per pt report--pt reports when it starts like labor pains and shooting pain and it all started Tuesday last week--and no N/V/D and   No Hx of diverticulitis last cologuard test was good --pain comes and goes --and was told needed the CT scan of the abd --concerning for some type acute colitis       PHQ-2 Total Score: 0  PHQ-9 Total Score: 0    Past Medical History:   Diagnosis Date   • Insomnia    • Oxygen dependent     wears 02 at hs only   • Pre-diabetes    • Pulmonary fibrosis (CMS/HCC)    • Sleep apnea    • Vertigo    • Vertigo    • Visit for screening mammogram        Allergies   Allergen Reactions   • Cephalexin Itching   • Excedrin Migraine [Asa-Apap-Caff Buffered] GI Intolerance        Past Surgical History:   Procedure Laterality Date   • APPENDECTOMY     • CHOLECYSTECTOMY     • HYSTERECTOMY     • TONSILLECTOMY     • TUBAL ABDOMINAL LIGATION          Social History     Tobacco Use   • Smoking status: Former Smoker     Quit date: 2004     Years since quittin.0   • Smokeless tobacco: Never Used   Vaping Use   • Vaping Use: Never used   Substance Use Topics   • Alcohol use: Never   • Drug use: Never       Family History   Problem Relation Age of Onset   • Alcohol abuse Father    • Breast cancer Sister         Health Maintenance Due   Topic Date Due   • COLORECTAL CANCER SCREENING  Never done   • TDAP/TD VACCINES (1 - Tdap) Never done   • ZOSTER VACCINE (2 of 3) 2019   • ANNUAL WELLNESS VISIT  Never done        Current Outpatient Medications on File Prior to Visit   Medication Sig   • albuterol sulfate  (90  Base) MCG/ACT inhaler inhale 2 puffs EVERY 4 HOURS AS NEEDED increase in directions use spacer AND mask with this inhaler   • amitriptyline (ELAVIL) 100 MG tablet Take 1 tablet by mouth Every Night.   • aspirin 81 MG chewable tablet aspirin 81 mg oral tablet,chewable chew 1 tablet (81 mg) by oral route once daily   Active   • budesonide-formoterol (Symbicort) 160-4.5 MCG/ACT inhaler Symbicort 160-4.5 mcg/actuation inhalation HFA aerosol inhaler inhale 2 puffs by inhalation route 2 times per day in the morning and evening   Suspended   • clotrimazole-betamethasone (LOTRISONE) 1-0.05 % cream APPLY TO THE AFFECTED AND SURROUNDING AREAS OF SKIN TOPICALLY TWICE DAILY IN THE MORNING AND EVENING FOR 2 WEEKS.   • hydroCHLOROthiazide (HYDRODIURIL) 12.5 MG tablet Take 1 tablet by mouth Daily.   • interferon gamma-1b (Actimmune) 7410633 UNIT/0.5ML syringe Actimmune 100 mcg (2 million unit)/0.5 mL subcutaneous solution   • ipratropium-albuterol (DUO-NEB) 0.5-2.5 mg/3 ml nebulizer ipratropium 0.5 mg-albuterol 3 mg (2.5 mg base)/3 mL nebulization soln   • levETIRAcetam (KEPPRA) 500 MG tablet TAKE ONE TABLET BY MOUTH TWICE DAILY FOR 30 DAYS   • meclizine (ANTIVERT) 25 MG tablet Take 1 tablet by mouth 3 (Three) Times a Day As Needed for Dizziness (or vertigo).   • vitamin D3 (vitamin d) 125 MCG (5000 UT) capsule capsule cholecalciferol (vitamin D3) 125 mcg (5,000 unit) oral capsule take 1 capsule by oral route daily   Active     No current facility-administered medications on file prior to visit.       Immunization History   Administered Date(s) Administered   • PPD Test 07/15/2021   • Pneumococcal Polysaccharide (PPSV23) 07/03/2019   • Zostavax 07/03/2019       [facility]  Review of Systems   Constitutional: Negative for chills, fever, unexpected weight gain and unexpected weight loss.   HENT: Negative.    Eyes: Negative.    Gastrointestinal: Positive for abdominal distention and abdominal pain. Negative for anal bleeding, blood  "in stool, constipation, diarrhea, nausea, rectal pain, vomiting, GERD and indigestion.   Genitourinary: Negative for decreased urine volume, difficulty urinating, frequency, hematuria, pelvic pain, pelvic pressure and urgency.   Musculoskeletal: Negative for back pain.   Neurological: Negative for tremors and light-headedness.   Hematological: Negative.    Psychiatric/Behavioral: Negative.         Objective     /70 (BP Location: Right arm, Patient Position: Sitting, Cuff Size: Adult)   Pulse 94   Temp 96.7 °F (35.9 °C) (Temporal)   Ht 161.9 cm (63.75\")   Wt 92.8 kg (204 lb 9.6 oz)   SpO2 96%   BMI 35.40 kg/m²       Physical Exam  Constitutional:       Appearance: Normal appearance.   HENT:      Head: Normocephalic.      Right Ear: Tympanic membrane normal.      Left Ear: Tympanic membrane normal.      Nose: Nose normal.      Mouth/Throat:      Comments: Wearing mask   Eyes:      Pupils: Pupils are equal, round, and reactive to light.   Cardiovascular:      Rate and Rhythm: Normal rate and regular rhythm.      Heart sounds: Normal heart sounds.   Pulmonary:      Effort: Pulmonary effort is normal.      Breath sounds: Normal breath sounds.   Abdominal:      General: Bowel sounds are normal.      Palpations: Abdomen is soft.      Tenderness: There is abdominal tenderness in the right lower quadrant and left lower quadrant. There is guarding. There is no right CVA tenderness or left CVA tenderness.      Hernia: No hernia is present.      Comments: Pt with facial grimacing with the palpation of the LLQ and RLQ    Musculoskeletal:         General: Normal range of motion.      Cervical back: Normal range of motion and neck supple.   Skin:     General: Skin is warm and dry.   Neurological:      Mental Status: She is alert and oriented to person, place, and time.   Psychiatric:         Mood and Affect: Mood normal.         Behavior: Behavior normal.         Judgment: Judgment normal.         Result Review : "     The following data was reviewed by: ALBAN Carpio on 07/12/2021:      POC Urinalysis Dipstick, Multipro (Automated dipstick) (07/08/2021 15:52)  UC with Lara Mccoy PA-C (07/08/2021)                 Assessment and Plan      Diagnoses and all orders for this visit:    1. Lower abdominal pain (Primary)  -     Comprehensive Metabolic Panel  -     CBC & Differential  -     Cancel: CT Abdomen Pelvis Without Contrast; Future  -     Cancel: CT Abdomen Pelvis Without Contrast; Future  -     CT Abdomen Pelvis Without Contrast; Future    2. Right lower quadrant abdominal tenderness, rebound tenderness presence not specified  -     Cancel: CT Abdomen Pelvis Without Contrast; Future  -     Cancel: CT Abdomen Pelvis Without Contrast; Future  -     CT Abdomen Pelvis Without Contrast; Future    3. Abdominal tenderness of left lower quadrant, rebound tenderness presence not specified  -     Cancel: CT Abdomen Pelvis Without Contrast; Future  -     Cancel: CT Abdomen Pelvis Without Contrast; Future  -     CT Abdomen Pelvis Without Contrast; Future    4. Screening-pulmonary TB  -     TB Skin Test      TB Skin Test (07/12/2021 17:09)        Follow Up     Return if symptoms worsen or fail to improve.    Getting CT scan and labs ASAP --concerning for a possible diverticulitis or some type of colitis

## 2021-07-12 NOTE — PROGRESS NOTES
Venipuncture Blood Specimen Collection  Venipuncture performed in LEFT ARM  by Violeta Amor with good hemostasis. Patient tolerated the procedure well without complications.   07/12/21   Violeta Amor

## 2021-07-13 DIAGNOSIS — Z09 FOLLOW UP: ICD-10-CM

## 2021-07-13 DIAGNOSIS — K57.92 ACUTE DIVERTICULITIS: Primary | ICD-10-CM

## 2021-07-13 RX ORDER — METRONIDAZOLE 500 MG/1
500 TABLET ORAL 3 TIMES DAILY
Qty: 30 TABLET | Refills: 0 | Status: SHIPPED | OUTPATIENT
Start: 2021-07-13 | End: 2021-08-23

## 2021-07-13 RX ORDER — AMOXICILLIN AND CLAVULANATE POTASSIUM 875; 125 MG/1; MG/1
1 TABLET, FILM COATED ORAL 2 TIMES DAILY
Qty: 20 TABLET | Refills: 0 | Status: SHIPPED | OUTPATIENT
Start: 2021-07-13 | End: 2021-08-23

## 2021-07-13 NOTE — PROGRESS NOTES
Also please call Suzanne and let her know that her comprehensive panel and her blood counts are good thank you

## 2021-07-13 NOTE — PROGRESS NOTES
please make sure that Suzanne knows that her comprehensive panel and her blood counts were normal thank you

## 2021-07-13 NOTE — PROGRESS NOTES
Please call Suzanne and let her know that I have received her CT scan and it does show evidence suggestive of acute diverticulitis in the sigmoid colon and that I need to send her to the gastroenterologist and in the meantime I have sent in 2 prescriptions Augmentin and Flagyl she needs to take both and I looked back in her prior history in the chart and she was able to tolerate Augmentin in the past and should anything worsen she needs to go to the emergency room immediately please

## 2021-07-15 PROCEDURE — 86580 TB INTRADERMAL TEST: CPT | Performed by: NURSE PRACTITIONER

## 2021-07-16 ENCOUNTER — OFFICE VISIT (OUTPATIENT)
Dept: ORTHOPEDIC SURGERY | Facility: CLINIC | Age: 71
End: 2021-07-16

## 2021-07-16 VITALS — HEART RATE: 85 BPM | WEIGHT: 207 LBS | OXYGEN SATURATION: 95 % | HEIGHT: 64 IN | BODY MASS INDEX: 35.34 KG/M2

## 2021-07-16 DIAGNOSIS — M25.561 PAIN IN BOTH KNEES, UNSPECIFIED CHRONICITY: Primary | ICD-10-CM

## 2021-07-16 DIAGNOSIS — M25.562 PAIN IN BOTH KNEES, UNSPECIFIED CHRONICITY: Primary | ICD-10-CM

## 2021-07-16 PROCEDURE — 99213 OFFICE O/P EST LOW 20 MIN: CPT | Performed by: PHYSICIAN ASSISTANT

## 2021-07-16 RX ORDER — MELOXICAM 15 MG/1
15 TABLET ORAL DAILY
Qty: 30 TABLET | Refills: 1 | Status: SHIPPED | OUTPATIENT
Start: 2021-07-16 | End: 2021-09-10

## 2021-07-16 NOTE — PROGRESS NOTES
"Chief Complaint  Pain of the Left Knee and Pain of the Right Knee    Subjective          Suzanne Gutierrez presents to [facility] for evaluation of bilateral knee pain.  Patient had right knee arthroscopy with partial lateral meniscectomy, chondroplasty of the medial femoral condyle 7/2/2020 by Dr. Langford.  She states the right knee hurts worse than the left, 8 out of 10.  Right knee painful anteriorly.  She takes occasional Aleve or ibuprofen or uses ice for pain relief.  Left knee pain is also anterior, she states she had a prior injection in this knee which greatly helped with her pain.  She states that if she stands for greater than 1 hour she has significant pain and states that she is very limited with kneeling or squatting due to the pain.    Objective   Vital Signs:   Pulse 85   Ht 162.6 cm (64\")   Wt 93.9 kg (207 lb)   SpO2 95%   BMI 35.53 kg/m²       Physical Exam  Constitutional:       Appearance: Normal appearance. He is well-developed and normal weight.   HENT:      Head: Normocephalic.      Right Ear: Hearing and external ear normal.      Left Ear: Hearing and external ear normal.      Nose: Nose normal.   Eyes:      Conjunctiva/sclera: Conjunctivae normal.   Cardiovascular:      Rate and Rhythm: Normal rate.   Pulmonary:      Effort: Pulmonary effort is normal.      Breath sounds: No wheezing or rales.   Abdominal:      Palpations: Abdomen is soft.      Tenderness: There is no abdominal tenderness.   Musculoskeletal:      Cervical back: Normal range of motion.   Skin:     Findings: No rash.   Neurological:      Mental Status: He is alert and oriented to person, place, and time.   Psychiatric:         Mood and Affect: Mood and affect normal.         Judgment: Judgment normal.     Ortho Exam  Right knee: Well-healed surgical incisions, skin intact, no swelling, tenderness to palpation of the medial joint line and about the patella.  She has full flexion and extension, " crepitus with range of motion, stable to varus and valgus stress, negative anterior and posterior drawer.  Sensation to light touch is intact in bilateral lower extremities and dorsalis pedis pulses are 2+.  Full range of motion right ankle and digits on the extremity.  Left knee: Skin intact, no swelling, no tenderness to palpation.  Full flexion and extension, no crepitus, stable to varus and valgus stress, negative anterior and posterior drawer.  Full range of motion of the left ankle and digits on the extremity.  Result Review :            Imaging Results (Most Recent)     Procedure Component Value Units Date/Time    XR Knee 3 View Bilateral [[id number]] Resulted: 07/16/21 1616     Updated: 07/16/21 1619    Narrative:      X-Ray Report:  Study: X-rays ordered, taken in the office, and reviewed today  Site: Right knee xray  Indication: Pain  View: AP, Lateral and Sunrise view(s)  Findings: Advanced osteoarthritic changes in the knee, most prominent at   the medial joint line.  Valgus deformity appreciated.  Patellofemoral   arthritis is noted.  No other acute osseous abnormalities, malalignment or   soft tissue swelling is appreciated.  Prior studies available for comparison: yes     X-Ray Report:  Study: X-rays ordered, taken in the office, and reviewed today  Site: Left knee xray  Indication: Pain  View: AP, Lateral and Sunrise view(s)  Findings: Mild to moderate osteoarthritic changes of the left knee, no   other acute osseous abnormalities, malalignment or soft tissue swelling   appreciated on this exam.  Prior studies available for comparison: yes                   Assessment and Plan    Problem List Items Addressed This Visit        Musculoskeletal and Injuries    Pain in both knees - Primary    Current Assessment & Plan     X-rays taken in office and reviewed.  Discussed treatment options with patient today, she would like to try conservative management at this time.  She will begin taking Mobic and doing  home exercises and follow-up in 4 to 6 weeks for recheck.         Relevant Orders    XR Knee 3 View Bilateral (Completed)          Follow Up   Return in about 6 weeks (around 8/27/2021) for Recheck.  Patient Instructions   Rest ice and elevate, begin taking Mobic as prescribed, you may need additional Tylenol for pain relief, home exercises advised, follow-up in 4 to 6 weeks for recheck.    Patient was given instructions and counseling regarding her condition or for health maintenance advice. Please see specific information pulled into the AVS if appropriate.

## 2021-07-16 NOTE — ASSESSMENT & PLAN NOTE
X-rays taken in office and reviewed.  Discussed treatment options with patient today, she would like to try conservative management at this time.  She will begin taking Mobic and doing home exercises and follow-up in 4 to 6 weeks for recheck.

## 2021-07-16 NOTE — PATIENT INSTRUCTIONS
Rest ice and elevate, begin taking Mobic as prescribed, you may need additional Tylenol for pain relief, home exercises advised, follow-up in 4 to 6 weeks for recheck.

## 2021-08-12 ENCOUNTER — HOSPITAL ENCOUNTER (OUTPATIENT)
Dept: CT IMAGING | Facility: HOSPITAL | Age: 71
Discharge: HOME | End: 2021-08-12
Admitting: INTERNAL MEDICINE

## 2021-08-12 DIAGNOSIS — J44.9 CHRONIC OBSTRUCTIVE PULMONARY DISEASE, UNSPECIFIED COPD TYPE (HCC): ICD-10-CM

## 2021-08-12 PROCEDURE — 71260 CT THORAX DX C+: CPT

## 2021-08-12 PROCEDURE — 0 IOPAMIDOL PER 1 ML: Performed by: INTERNAL MEDICINE

## 2021-08-12 RX ADMIN — IOPAMIDOL 100 ML: 755 INJECTION, SOLUTION INTRAVENOUS at 13:25

## 2021-08-17 ENCOUNTER — TRANSCRIBE ORDERS (OUTPATIENT)
Dept: ADMINISTRATIVE | Facility: HOSPITAL | Age: 71
End: 2021-08-17

## 2021-08-17 DIAGNOSIS — J44.9 OBSTRUCTIVE CHRONIC BRONCHITIS WITHOUT EXACERBATION (HCC): Primary | ICD-10-CM

## 2021-08-21 DIAGNOSIS — R42 DIZZINESS AND GIDDINESS: ICD-10-CM

## 2021-08-21 RX ORDER — MECLIZINE HYDROCHLORIDE 25 MG/1
TABLET ORAL
Qty: 30 TABLET | Refills: 2 | Status: SHIPPED | OUTPATIENT
Start: 2021-08-21 | End: 2021-11-11 | Stop reason: SDUPTHER

## 2021-08-23 ENCOUNTER — OFFICE VISIT (OUTPATIENT)
Dept: FAMILY MEDICINE CLINIC | Facility: CLINIC | Age: 71
End: 2021-08-23

## 2021-08-23 VITALS — OXYGEN SATURATION: 96 % | BODY MASS INDEX: 35.87 KG/M2 | WEIGHT: 209 LBS | HEART RATE: 86 BPM | TEMPERATURE: 97.3 F

## 2021-08-23 DIAGNOSIS — R59.0 HILAR LYMPHADENOPATHY: ICD-10-CM

## 2021-08-23 DIAGNOSIS — R91.1 LUNG NODULE: Primary | ICD-10-CM

## 2021-08-23 DIAGNOSIS — J84.10 PULMONARY FIBROSIS (HCC): ICD-10-CM

## 2021-08-23 DIAGNOSIS — R09.81 CHRONIC NASAL CONGESTION: ICD-10-CM

## 2021-08-23 DIAGNOSIS — J44.9 COPD MIXED TYPE (HCC): ICD-10-CM

## 2021-08-23 PROCEDURE — 99213 OFFICE O/P EST LOW 20 MIN: CPT | Performed by: NURSE PRACTITIONER

## 2021-08-23 NOTE — PROGRESS NOTES
"Chief Complaint  Lung Nodule (Follow up on CT results. Pt would like 2nd opinion on results )    Subjective            Suzanne Gutierrez presents to [facility]  Pt here today to go over the recent CT scan of the chest--and pt was just a little concerned as there stable LLL,  7 mm nodule and they are continuing to monitor this--then also the stable mildly prominent right hilar lymph node--and cont to monitor that as well--pt reports right now doing the Q 6 month F/U visit with oncologist DR Mane and then the Q 6 month CT scans as well-patient reports that she even asked  \"Do I have cancer and he told me he did not think so\"     Pt also sees pulmonary as well--DR Mcclellan-and sees him for the pulmonary fibrosis  and COPD --and sees him normally Q 3 months--last visit was in  she thinks--    Also always nasally congested as well--and tried Claritin OTC and dayquil and nothing really helps       PHQ-2 Total Score:    PHQ-9 Total Score:      Past Medical History:   Diagnosis Date   • Insomnia    • Oxygen dependent     wears 02 at hs only   • Pre-diabetes    • Pulmonary fibrosis (CMS/HCC)    • Sleep apnea    • Vertigo    • Vertigo    • Visit for screening mammogram 2018       Allergies   Allergen Reactions   • Cephalexin Itching   • Excedrin Migraine [Asa-Apap-Caff Buffered] GI Intolerance        Past Surgical History:   Procedure Laterality Date   • APPENDECTOMY     • CHOLECYSTECTOMY     • HYSTERECTOMY     • TONSILLECTOMY     • TUBAL ABDOMINAL LIGATION          Social History     Tobacco Use   • Smoking status: Former Smoker     Quit date: 2004     Years since quittin.1   • Smokeless tobacco: Never Used   Vaping Use   • Vaping Use: Never used   Substance Use Topics   • Alcohol use: Never   • Drug use: Never       Family History   Problem Relation Age of Onset   • Alcohol abuse Father    • Breast cancer Sister         Health Maintenance Due   Topic Date Due   • COLORECTAL " CANCER SCREENING  Never done   • TDAP/TD VACCINES (1 - Tdap) Never done   • ZOSTER VACCINE (2 of 3) 08/28/2019   • ANNUAL WELLNESS VISIT  Never done        Current Outpatient Medications on File Prior to Visit   Medication Sig   • albuterol sulfate  (90 Base) MCG/ACT inhaler inhale 2 puffs EVERY 4 HOURS AS NEEDED increase in directions use spacer AND mask with this inhaler   • amitriptyline (ELAVIL) 100 MG tablet Take 1 tablet by mouth Every Night.   • aspirin 81 MG chewable tablet aspirin 81 mg oral tablet,chewable chew 1 tablet (81 mg) by oral route once daily   Active   • clotrimazole-betamethasone (LOTRISONE) 1-0.05 % cream APPLY TO THE AFFECTED AND SURROUNDING AREAS OF SKIN TOPICALLY TWICE DAILY IN THE MORNING AND EVENING FOR 2 WEEKS.   • Fluticasone-Umeclidin-Vilant (Trelegy Ellipta) 100-62.5-25 MCG/INH inhaler Inhale 1 puff Daily.   • hydroCHLOROthiazide (HYDRODIURIL) 12.5 MG tablet Take 1 tablet by mouth Daily.   • interferon gamma-1b (Actimmune) 5310399 UNIT/0.5ML syringe Actimmune 100 mcg (2 million unit)/0.5 mL subcutaneous solution   • ipratropium-albuterol (DUO-NEB) 0.5-2.5 mg/3 ml nebulizer ipratropium 0.5 mg-albuterol 3 mg (2.5 mg base)/3 mL nebulization soln   • levETIRAcetam (KEPPRA) 500 MG tablet TAKE ONE TABLET BY MOUTH TWICE DAILY FOR 30 DAYS   • meclizine (ANTIVERT) 25 MG tablet TAKE 1 TABLET BY MOUTH THREE TIMES DAILY AS NEEDED FOR DIZZINESS OR VERTIGO   • meloxicam (MOBIC) 15 MG tablet Take 1 tablet by mouth Daily.   • vitamin D3 (vitamin d) 125 MCG (5000 UT) capsule capsule cholecalciferol (vitamin D3) 125 mcg (5,000 unit) oral capsule take 1 capsule by oral route daily   Active   • [DISCONTINUED] amoxicillin-clavulanate (Augmentin) 875-125 MG per tablet Take 1 tablet by mouth 2 (Two) Times a Day.   • [DISCONTINUED] budesonide-formoterol (Symbicort) 160-4.5 MCG/ACT inhaler Symbicort 160-4.5 mcg/actuation inhalation HFA aerosol inhaler inhale 2 puffs by inhalation route 2 times per day  in the morning and evening   Suspended   • [DISCONTINUED] metroNIDAZOLE (Flagyl) 500 MG tablet Take 1 tablet by mouth 3 (Three) Times a Day.     No current facility-administered medications on file prior to visit.       Immunization History   Administered Date(s) Administered   • PPD Test 07/15/2021   • Pneumococcal Polysaccharide (PPSV23) 07/03/2019   • Zostavax 07/03/2019       [facility]  Review of Systems   Constitutional: Positive for fatigue. Negative for appetite change and unexpected weight loss.        No changes with her level of fatigue    HENT: Positive for congestion.         Always has the nasal congestion-   Eyes: Negative.    Respiratory: Positive for cough, chest tightness, shortness of breath and wheezing.         Intermittent wheezes and chest tightness--   Gastrointestinal: Negative.  Negative for anal bleeding, blood in stool, nausea and vomiting.   Endocrine: Negative.    Genitourinary: Negative.    Neurological: Negative.  Negative for dizziness and light-headedness.   Hematological: Negative.    Psychiatric/Behavioral: Negative.         Objective     Pulse 86   Temp 97.3 °F (36.3 °C)   Wt 94.8 kg (209 lb)   SpO2 96%   BMI 35.87 kg/m²       Physical Exam  Vitals and nursing note reviewed.   Constitutional:       Appearance: Normal appearance.   HENT:      Head: Normocephalic.      Right Ear: External ear normal.      Left Ear: External ear normal.      Nose: Nose normal.      Mouth/Throat:      Comments: wearingmask   Eyes:      Pupils: Pupils are equal, round, and reactive to light.   Cardiovascular:      Rate and Rhythm: Normal rate and regular rhythm.      Heart sounds: Normal heart sounds.   Pulmonary:      Effort: Pulmonary effort is normal.      Breath sounds: Normal breath sounds.   Abdominal:      General: Bowel sounds are normal.      Palpations: Abdomen is soft.   Musculoskeletal:         General: Normal range of motion.      Cervical back: Normal range of motion and neck  supple.   Skin:     General: Skin is warm and dry.   Neurological:      Mental Status: She is alert and oriented to person, place, and time.   Psychiatric:         Mood and Affect: Mood normal.         Behavior: Behavior normal.         Judgment: Judgment normal.         Result Review :     The following data was reviewed by: ALBAN Carpio on 08/23/2021:      CT Chest With Contrast (08/17/2021 13:15)  CT Chest With Contrast Diagnostic (08/12/2021 14:37)  CT CHEST HI RESOLUTION WITH CONTRAST DIAGNOSTIC(INACTIVE) (04/01/2021 15:39)  CT CHEST HI RESOLUTION WITH CONTRAST DIAGNOSTIC(INACTIVE) (08/19/2020 15:52)  CT CHEST HI RESOLUTION WITH CONTRAST DIAGNOSTIC(INACTIVE) (03/17/2020 10:48)  CT Chest Without Contrast Diagnostic (11/08/2019 11:38)    Reviewed the report that the patient brought in and she had certain areas highlighted that she had questions about--there was a small hiatal hernia; and then the hilar lymphadenopathy; and then the stable left lower lobe 7 mm lung nodule;              Assessment and Plan      Diagnoses and all orders for this visit:    1. Lung nodule (Primary)    2. Hilar lymphadenopathy    3. Pulmonary fibrosis (CMS/HCC)    4. COPD mixed type (CMS/HCC)    5. Chronic nasal congestion    Recommended patient follow-up with Dr. Mcclellan and discussed the CT scan results with him as this is his specialty reassured patient that close monitoring every 6 month CT scan is the normal follow-up but if Dr. Mcclellan thinks that a bronchoscopy is needed then he would recommend that after reviewing everything        Follow Up     Return if symptoms worsen or fail to improve.

## 2021-08-30 ENCOUNTER — OFFICE VISIT (OUTPATIENT)
Dept: NEUROLOGY | Facility: CLINIC | Age: 71
End: 2021-08-30

## 2021-08-30 ENCOUNTER — OFFICE VISIT (OUTPATIENT)
Dept: ORTHOPEDIC SURGERY | Facility: CLINIC | Age: 71
End: 2021-08-30

## 2021-08-30 VITALS — HEIGHT: 64 IN | BODY MASS INDEX: 32.44 KG/M2 | RESPIRATION RATE: 14 BRPM | WEIGHT: 190 LBS

## 2021-08-30 VITALS
SYSTOLIC BLOOD PRESSURE: 124 MMHG | BODY MASS INDEX: 36.04 KG/M2 | HEART RATE: 89 BPM | DIASTOLIC BLOOD PRESSURE: 77 MMHG | HEIGHT: 64 IN | WEIGHT: 211.1 LBS

## 2021-08-30 DIAGNOSIS — M25.561 PAIN IN BOTH KNEES, UNSPECIFIED CHRONICITY: Primary | ICD-10-CM

## 2021-08-30 DIAGNOSIS — G40.009 PARTIAL IDIOPATHIC EPILEPSY WITH SEIZURES OF LOCALIZED ONSET, NOT INTRACTABLE, WITHOUT STATUS EPILEPTICUS (HCC): Primary | ICD-10-CM

## 2021-08-30 DIAGNOSIS — M25.562 PAIN IN BOTH KNEES, UNSPECIFIED CHRONICITY: Primary | ICD-10-CM

## 2021-08-30 PROCEDURE — 20610 DRAIN/INJ JOINT/BURSA W/O US: CPT | Performed by: PHYSICIAN ASSISTANT

## 2021-08-30 PROCEDURE — 99213 OFFICE O/P EST LOW 20 MIN: CPT | Performed by: NURSE PRACTITIONER

## 2021-08-30 RX ORDER — LIDOCAINE HYDROCHLORIDE 10 MG/ML
9 INJECTION, SOLUTION INFILTRATION; PERINEURAL
Status: COMPLETED | OUTPATIENT
Start: 2021-08-30 | End: 2021-08-30

## 2021-08-30 RX ORDER — LEVETIRACETAM 500 MG/1
500 TABLET ORAL 2 TIMES DAILY
Qty: 60 TABLET | Refills: 5 | Status: SHIPPED | OUTPATIENT
Start: 2021-08-30 | End: 2022-03-15 | Stop reason: SDUPTHER

## 2021-08-30 RX ORDER — METHYLPREDNISOLONE ACETATE 80 MG/ML
80 INJECTION, SUSPENSION INTRA-ARTICULAR; INTRALESIONAL; INTRAMUSCULAR; SOFT TISSUE
Status: COMPLETED | OUTPATIENT
Start: 2021-08-30 | End: 2021-08-30

## 2021-08-30 RX ADMIN — LIDOCAINE HYDROCHLORIDE 9 ML: 10 INJECTION, SOLUTION INFILTRATION; PERINEURAL at 15:12

## 2021-08-30 RX ADMIN — METHYLPREDNISOLONE ACETATE 80 MG: 80 INJECTION, SUSPENSION INTRA-ARTICULAR; INTRALESIONAL; INTRAMUSCULAR; SOFT TISSUE at 15:12

## 2021-08-30 NOTE — PROGRESS NOTES
"Chief Complaint  Pain of the Right Knee    Subjective          Suzanne Gutierrez presents to [facility] for follow-up on bilateral knee pain.  At last visit patient was prescribed meloxicam and has been doing home exercises, she states the pain in her left knee has greatly improved.  She states her right knee still aches anteriorly and now posteriorly.  She had an injection in the knee previously which greatly helped her and she is interested in this again today.    Objective   Vital Signs:   Resp 14   Ht 162.6 cm (64\")   Wt 86.2 kg (190 lb)   BMI 32.61 kg/m²       Physical Exam  Constitutional:       Appearance: Normal appearance. Patient is well-developed and normal weight.   HENT:      Head: Normocephalic.      Right Ear: Hearing and external ear normal.      Left Ear: Hearing and external ear normal.      Nose: Nose normal.   Eyes:      Conjunctiva/sclera: Conjunctivae normal.   Cardiovascular:      Rate and Rhythm: Normal rate.   Pulmonary:      Effort: Pulmonary effort is normal.      Breath sounds: No wheezing or rales.   Abdominal:      Palpations: Abdomen is soft.      Tenderness: There is no abdominal tenderness.   Musculoskeletal:      Cervical back: Normal range of motion.   Skin:     Findings: No rash.   Neurological:      Mental Status: Patient is alert and oriented to person, place, and time.   Psychiatric:         Mood and Affect: Mood and affect normal.         Judgment: Judgment normal.     Ortho Exam  Right knee: Tenderness to the joint line, skin intact, moderate swelling.  Full flexion extension, crepitus with range of motion, stable to varus valgus stress, gait nonantalgic, sensation light touch intact in the lower extremities, posterior tibialis pulses 2+.  Good range of motion right ankle and digits.  Left knee: No tenderness or swelling, full range of motion.  Result Review :            Imaging Results (Most Recent)     None         Large Joint Arthrocentesis: R " knee  Date/Time: 8/30/2021 3:12 PM  Consent given by: patient  Site marked: site marked  Timeout: Immediately prior to procedure a time out was called to verify the correct patient, procedure, equipment, support staff and site/side marked as required   Supporting Documentation  Indications: pain   Procedure Details  Location: knee - R knee  Needle gauge: 21g.  Medications administered: 9 mL lidocaine 1 %; 80 mg methylPREDNISolone acetate 80 MG/ML  Patient tolerance: patient tolerated the procedure well with no immediate complications            Assessment and Plan    Problem List Items Addressed This Visit        Musculoskeletal and Injuries    Pain in both knees - Primary    Current Assessment & Plan     Patient elected to receive right knee steroid injection today, tolerated well, risk and benefits discussed, recommend resting icing and elevating over the next 1 to 2 days.  We will follow up in 6 weeks for recheck.  Continue Mobic and home exercises at this time.  If no improvement with steroid injection may consider gel injection at next appointment.               Follow Up   Return in about 6 weeks (around 10/11/2021) for Recheck.  Patient Instructions   Patient elected to receive right knee steroid injection today, tolerated well, risk and benefits discussed, recommend resting icing and elevating over the next 1 to 2 days.  We will follow up in 6 weeks for recheck.  Continue Mobic and home exercises at this time.  If no improvement with steroid injection may consider gel injection at next appointment.    Patient was given instructions and counseling regarding her condition or for health maintenance advice. Please see specific information pulled into the AVS if appropriate.

## 2021-08-30 NOTE — PATIENT INSTRUCTIONS
Seizure, Adult  A seizure is a sudden burst of abnormal electrical activity in the brain. Seizures usually last from 30 seconds to 2 minutes. The abnormal activity temporarily interrupts normal brain function.  Many types of seizures can affect adults. A seizure can cause many different symptoms depending on where in the brain it starts.  What are the causes?  Common causes of this condition include:  · Fever or infection.  · Brain injury, head trauma, bleeding in the brain, or tumor.  · Low blood sugar.  · Metabolic disorders or other conditions that are passed from parent to child (are inherited).  · Reaction to a substance, such as a drug or a medicine, or suddenly stopping the use of a substance (withdrawal).  · Stroke.  · Developmental disorders such as autism or cerebral palsy.  In some cases, the cause of this condition may not be known. Some people who have a seizure never have another one. Seizures usually do not cause brain damage or permanent problems unless they are prolonged. A person who has repeated seizures over time without a clear cause has a condition called epilepsy.  What increases the risk?  You are more likely to develop this condition if you have:  · A family history of epilepsy.  · Had a tonic-clonic seizure in the past. This is a type of seizure that involves whole-body contraction of muscles and a loss of consciousness.  · A history of head trauma, lack of oxygen at birth, or strokes.  What are the signs or symptoms?  There are many different types of seizures. The symptoms vary depending on the type of seizure you have. Symptoms occur during the seizure and may also occur before a seizure (aura) and after a seizure (postictal).  Symptoms during a seizure  · Uncontrollable shaking (convulsions).  · Stiffening of the body.  · Loss of consciousness.  · Head nodding.  · Staring.  · Not responding to sound or touch.  · Loss of bladder or bowel control.  Symptoms before a seizure  · Fear or  anxiety.  · Nausea.  · Feeling like the room is spinning (vertigo).  · A feeling of having seen or heard something before (déjà vu).  · Odd tastes or smells.  · Changes in vision, such as seeing flashing lights or spots.  Symptoms after a seizure  · Confusion.  · Sleepiness.  · Headache.  · Weakness on one side of the body.  How is this diagnosed?  This condition may be diagnosed based on:  · A description of your symptoms. Video of your seizures can be helpful.  · Your medical history.  · A physical exam.  You may also have tests, including:  · Blood tests.  · CT scan.  · MRI.  · Electroencephalogram (EEG). This test measures electrical activity in the brain. An EEG can predict whether seizures will return (recur).  · A spinal tap (also called a lumbar puncture). This is the removal and testing of fluid that surrounds the brain and spinal cord.  How is this treated?  Most seizures will stop on their own in under 5 minutes, and no treatment is needed. Seizures that last longer than 5 minutes will usually need treatment. Treatment can include:  · Medicines given through an IV.  · Avoiding known triggers, such as medicines that you take for another condition.  · Medicines to treat epilepsy (antiepileptics), if epilepsy caused your seizures.  · Surgery to stop seizures, if you have epilepsy that does not respond to medicines.  Follow these instructions at home:  Medicines  · Take over-the-counter and prescription medicines only as told by your health care provider.  · Avoid any substances that may prevent your medicine from working properly, such as alcohol.  Activity  · Do not drive, swim, or do any other activities that would be dangerous if you had another seizure. Wait until your health care provider says it is safe to do them.  · If you live in the [place] check with your local DMV (department of motor vehicles) to find out about local driving laws. Each state has specific rules about when you can legally return to  driving.  · Get enough rest. Lack of sleep can make seizures more likely to occur.  Educating others  Teach friends and family what to do if you have a seizure. They should:  · Lay you on the ground to prevent a fall.  · Cushion your head and body.  · Loosen any tight clothing around your neck.  · Turn you on your side. If vomiting occurs, this helps keep your airway clear.  · Not hold you down. Holding you down will not stop the seizure.  · Not put anything into your mouth.  · Know whether or not you need emergency care. For example, they should get help right away if you have a seizure that lasts longer than 5 minutes or have several seizures in a row.  · Stay with you until you recover.    General instructions  · Contact your health care provider each time you have a seizure.  · Avoid anything that has ever triggered a seizure for you.  · Keep a seizure diary. Record what you remember about each seizure, especially anything that might have triggered the seizure.  · Keep all follow-up visits as told by your health care provider. This is important.  Contact a health care provider if:  · You have another seizure.  · You have seizures more often.  · Your seizure symptoms change.  · You continue to have seizures with treatment.  · You have symptoms of an infection or illness. This might increase your risk of having a seizure.  Get help right away if:  · You have a seizure that:  ? Lasts longer than 5 minutes.  ? Is different than previous seizures.  ? Leaves you unable to speak or use a part of your body.  ? Makes it harder to breathe.  · You have:  ? A seizure after a head injury.  ? Multiple seizures in a row.  ? Confusion or a severe headache right after a seizure.  · You do not wake up immediately after a seizure.  · You injure yourself during a seizure.  These symptoms may represent a serious problem that is an emergency. Do not wait to see if the symptoms will go away. Get medical help right away. Call your  local emergency services (911 in the [place] Do not drive yourself to the hospital.  Summary  · Seizures are caused by abnormal electrical activity in the brain. The activity disrupts normal brain function and can cause various symptoms, such as convulsions, abnormal movements, or a change in consciousness.  · There are many causes of seizures, including illnesses, medicines, genetic conditions, head injuries, strokes, tumors, substance abuse, or substance withdrawal.  · Most seizures will stop on their own in under 5 minutes. Seizures that last longer than 5 minutes are a medical emergency and require immediate treatment.  · Many medicines are used to treat seizures. Take over-the-counter and prescription medicines only as told by your health care provider.  This information is not intended to replace advice given to you by your health care provider. Make sure you discuss any questions you have with your health care provider.  Document Revised: 11/13/2020 Document Reviewed: 11/13/2020  [employer] Patient Education © 2021 [employer]

## 2021-08-30 NOTE — PATIENT INSTRUCTIONS
Patient elected to receive right knee steroid injection today, tolerated well, risk and benefits discussed, recommend resting icing and elevating over the next 1 to 2 days.  We will follow up in 6 weeks for recheck.  Continue Mobic and home exercises at this time.  If no improvement with steroid injection may consider gel injection at next appointment.

## 2021-08-30 NOTE — ASSESSMENT & PLAN NOTE
Continue Keppra.  Discussed routine seizure precautions including, but not limited to, avoiding bathing alone, swimming alone, climbing on ladders.  Also discussed need for medication compliance and risks of unmanaged epilepsy including status epilepticus, permanent brain injury or potentially death.  Patient is aware of [place] state law regarding no driving for 90 days following a seizure.

## 2021-08-30 NOTE — PROGRESS NOTES
"Chief Complaint  Seizures (4mth follow up)    Subjective          Suzanne Gutierrez presents to [facility]  Denies seizures since previous visit.  States she's been doing well with Keppra.  Denies side effect.       Objective   Vital Signs:   /77   Pulse 89   Ht 162.6 cm (64\")   Wt 95.8 kg (211 lb 1.6 oz)   BMI 36.24 kg/m²     Physical Exam  HENT:      Head: Normocephalic.   Pulmonary:      Effort: Pulmonary effort is normal.   Neurological:      Mental Status: She is alert and oriented to person, place, and time.      Cranial Nerves: Cranial nerves are intact.      Sensory: Sensation is intact.      Motor: Motor function is intact.      Coordination: Coordination is intact.      Deep Tendon Reflexes: Reflexes are normal and symmetric.        Neurologic Exam     Mental Status   Oriented to person, place, and time.        Result Review :               Assessment and Plan    Diagnoses and all orders for this visit:    1. Partial idiopathic epilepsy with seizures of localized onset, not intractable, without status epilepticus (CMS/HCC) (Primary)  Assessment & Plan:  Continue Keppra.  Discussed routine seizure precautions including, but not limited to, avoiding bathing alone, swimming alone, climbing on ladders.  Also discussed need for medication compliance and risks of unmanaged epilepsy including status epilepticus, permanent brain injury or potentially death.  Patient is aware of [place] state law regarding no driving for 90 days following a seizure.        Other orders  -     levETIRAcetam (KEPPRA) 500 MG tablet; Take 1 tablet by mouth 2 (Two) Times a Day.  Dispense: 60 tablet; Refill: 5      Follow Up   Return in about 6 months (around 2/28/2022) for seizure f/u.  Patient was given instructions and counseling regarding her condition or for health maintenance advice. Please see specific information pulled into the AVS if appropriate.       "

## 2021-09-10 RX ORDER — MELOXICAM 15 MG/1
15 TABLET ORAL DAILY
Qty: 30 TABLET | Refills: 1 | Status: SHIPPED | OUTPATIENT
Start: 2021-09-10 | End: 2022-01-17 | Stop reason: SDUPTHER

## 2021-09-13 ENCOUNTER — OFFICE VISIT (OUTPATIENT)
Dept: FAMILY MEDICINE CLINIC | Facility: CLINIC | Age: 71
End: 2021-09-13

## 2021-09-13 VITALS — OXYGEN SATURATION: 98 % | HEART RATE: 75 BPM | TEMPERATURE: 97.3 F

## 2021-09-13 DIAGNOSIS — G47.00 INSOMNIA, UNSPECIFIED TYPE: ICD-10-CM

## 2021-09-13 DIAGNOSIS — R05.9 COUGH: Primary | ICD-10-CM

## 2021-09-13 DIAGNOSIS — J02.9 PHARYNGITIS, UNSPECIFIED ETIOLOGY: ICD-10-CM

## 2021-09-13 LAB
EXPIRATION DATE: NORMAL
INTERNAL CONTROL: NORMAL
Lab: NORMAL
SARS-COV-2 AG UPPER RESP QL IA.RAPID: NOT DETECTED

## 2021-09-13 PROCEDURE — U0005 INFEC AGEN DETEC AMPLI PROBE: HCPCS | Performed by: FAMILY MEDICINE

## 2021-09-13 PROCEDURE — 87426 SARSCOV CORONAVIRUS AG IA: CPT | Performed by: FAMILY MEDICINE

## 2021-09-13 PROCEDURE — 99213 OFFICE O/P EST LOW 20 MIN: CPT | Performed by: FAMILY MEDICINE

## 2021-09-13 PROCEDURE — U0004 COV-19 TEST NON-CDC HGH THRU: HCPCS | Performed by: FAMILY MEDICINE

## 2021-09-13 PROCEDURE — C9803 HOPD COVID-19 SPEC COLLECT: HCPCS | Performed by: FAMILY MEDICINE

## 2021-09-13 RX ORDER — AZITHROMYCIN 250 MG/1
TABLET, FILM COATED ORAL
Qty: 6 TABLET | Refills: 0 | Status: SHIPPED | OUTPATIENT
Start: 2021-09-13 | End: 2021-11-01

## 2021-09-13 RX ORDER — AMITRIPTYLINE HYDROCHLORIDE 100 MG/1
100 TABLET, FILM COATED ORAL NIGHTLY
Qty: 90 TABLET | Refills: 0 | Status: SHIPPED | OUTPATIENT
Start: 2021-09-13 | End: 2021-10-20 | Stop reason: SDUPTHER

## 2021-09-13 RX ORDER — PREDNISONE 20 MG/1
60 TABLET ORAL DAILY
Qty: 15 TABLET | Refills: 0 | Status: SHIPPED | OUTPATIENT
Start: 2021-09-13 | End: 2021-09-18

## 2021-09-13 NOTE — PROGRESS NOTES
Chief Complaint  Cough (Cough, sore thorat, ear ache x five days. Family member pos for Covid), Sore Throat, and Earache    Subjective          Suzanne Gutierrez presents to [facility]  URI   This is a new problem. Episode onset: 4 days ago. The problem has been gradually worsening. There has been no fever. Associated symptoms include congestion, coughing, ear pain, sinus pain and a sore throat. Pertinent negatives include no abdominal pain, chest pain, diarrhea, dysuria, headaches, joint pain, joint swelling, nausea, neck pain, plugged ear sensation, rash, rhinorrhea, sneezing, swollen glands, vomiting or wheezing. She has tried nothing for the symptoms. The treatment provided no relief.       Objective   Allergies   Allergen Reactions   • Cephalexin Itching   • Excedrin Migraine [Asa-Apap-Caff Buffered] GI Intolerance     Immunization History   Administered Date(s) Administered   • COVID-19 (PFIZER) 05/07/2021, 06/01/2021   • PPD Test 07/15/2021   • Pneumococcal Polysaccharide (PPSV23) 07/03/2019   • Zostavax 07/03/2019       Vital Signs:   Vitals:    09/13/21 1307   Pulse: 75   Temp: 97.3 °F (36.3 °C)   SpO2: 98%       Physical Exam  Vitals reviewed.   Constitutional:       Appearance: Normal appearance. She is well-developed.   HENT:      Head: Normocephalic and atraumatic.      Right Ear: Tympanic membrane, ear canal and external ear normal.      Left Ear: Tympanic membrane, ear canal and external ear normal.      Nose: Nose normal.      Mouth/Throat:      Mouth: Mucous membranes are moist.      Pharynx: Oropharynx is clear. Posterior oropharyngeal erythema present. No oropharyngeal exudate.   Eyes:      Conjunctiva/sclera: Conjunctivae normal.      Pupils: Pupils are equal, round, and reactive to light.   Cardiovascular:      Rate and Rhythm: Normal rate and regular rhythm.      Pulses: Normal pulses.      Heart sounds: Normal heart sounds. No murmur heard.   No friction rub. No  gallop.    Pulmonary:      Effort: Pulmonary effort is normal.      Breath sounds: Normal breath sounds. No wheezing or rhonchi.   Abdominal:      General: Bowel sounds are normal. There is no distension.      Palpations: Abdomen is soft.      Tenderness: There is no abdominal tenderness.   Musculoskeletal:      Cervical back: Normal range of motion and neck supple.   Skin:     General: Skin is warm and dry.      Capillary Refill: Capillary refill takes less than 2 seconds.   Neurological:      Mental Status: She is alert and oriented to person, place, and time.      Cranial Nerves: No cranial nerve deficit.   Psychiatric:         Mood and Affect: Mood and affect normal.         Behavior: Behavior normal.         Thought Content: Thought content normal.         Judgment: Judgment normal.        Result Review :                 Assessment and Plan    Diagnoses and all orders for this visit:    1. Cough (Primary)  -     POCT SARS-CoV-2 Antigen EMILY    2. Pharyngitis, unspecified etiology  -     COVID-19,CEPHEID/SAIRA/BDMAX,COR/RONNIE/PAD/HARI IN-HOUSE(OR EMERGENT/ADD-ON),NP SWAB IN TRANSPORT MEDIA 3-4 HR TAT, RT-PCR - Swab, Nasopharynx  -     azithromycin (Zithromax Z-Mark) 250 MG tablet; Take 2 tablets by mouth on day 1, then 1 tablet daily on days 2-5  Dispense: 6 tablet; Refill: 0  -     predniSONE (DELTASONE) 20 MG tablet; Take 3 tablets by mouth Daily for 5 days.  Dispense: 15 tablet; Refill: 0            Follow Up   Return in about 1 week (around 9/20/2021), or if symptoms worsen or fail to improve.  Patient was given instructions and counseling regarding her condition or for health maintenance advice. Please see specific information pulled into the AVS if appropriate.

## 2021-09-13 NOTE — TELEPHONE ENCOUNTER
Caller: Matt Suzanne AYAZ    Relationship: Self    Best call back number: [phone]    Medication needed:   Requested Prescriptions     Pending Prescriptions Disp Refills   • amitriptyline (ELAVIL) 100 MG tablet 90 tablet 0     Sig: Take 1 tablet by mouth Every Night.       When do you need the refill by: ASAP    Does the patient have less than a 3 day supply:  [x] Yes  [] No    What is the patient's preferred pharmacy: [address] [phone] [facility] [phone]

## 2021-09-14 LAB — SARS-COV-2 RNA NOSE QL NAA+PROBE: NOT DETECTED

## 2021-10-02 ENCOUNTER — CLINICAL SUPPORT (OUTPATIENT)
Dept: FAMILY MEDICINE CLINIC | Facility: CLINIC | Age: 71
End: 2021-10-02

## 2021-10-02 DIAGNOSIS — Z23 NEED FOR INFLUENZA VACCINATION: Primary | ICD-10-CM

## 2021-10-02 PROCEDURE — 90662 IIV NO PRSV INCREASED AG IM: CPT | Performed by: FAMILY MEDICINE

## 2021-10-02 PROCEDURE — G0008 ADMIN INFLUENZA VIRUS VAC: HCPCS | Performed by: FAMILY MEDICINE

## 2021-10-08 ENCOUNTER — TRANSCRIBE ORDERS (OUTPATIENT)
Dept: FAMILY MEDICINE CLINIC | Facility: CLINIC | Age: 71
End: 2021-10-08

## 2021-10-08 DIAGNOSIS — Z12.31 VISIT FOR SCREENING MAMMOGRAM: Primary | ICD-10-CM

## 2021-10-18 ENCOUNTER — OFFICE VISIT (OUTPATIENT)
Dept: ORTHOPEDIC SURGERY | Facility: CLINIC | Age: 71
End: 2021-10-18

## 2021-10-18 VITALS — WEIGHT: 214.2 LBS | OXYGEN SATURATION: 96 % | BODY MASS INDEX: 36.57 KG/M2 | HEIGHT: 64 IN | HEART RATE: 84 BPM

## 2021-10-18 DIAGNOSIS — M17.11 PRIMARY LOCALIZED OSTEOARTHROSIS OF RIGHT LOWER LEG: Primary | ICD-10-CM

## 2021-10-18 PROCEDURE — 20610 DRAIN/INJ JOINT/BURSA W/O US: CPT | Performed by: PHYSICIAN ASSISTANT

## 2021-10-18 NOTE — PROGRESS NOTES
"Chief Complaint  Pain of the Right Knee    Subjective          Suzanne Gutierrez presents to [facility] for follow-up on bilateral knee pain.  Patient is taking meloxicam and doing some home exercises.  She states the pain in her left knee is tolerable but she still having significant pain in the right knee.  She had a right knee steroid injection 8/30/2021 which she states did not help much with the pain.  She is here today to try a Synvisc injection in the right knee, this is the first gel injection she is received.  Majority of the pain in the right knee is in the joint line but she notes pain in the hamstrings and the posterior aspect of the knee.    Objective   Allergies   Allergen Reactions   • Cephalexin Itching   • Excedrin Migraine [Asa-Apap-Caff Buffered] GI Intolerance       Vital Signs:   Pulse 84   Ht 162.6 cm (64\")   Wt 97.2 kg (214 lb 3.2 oz)   SpO2 96%   BMI 36.77 kg/m²       Physical Exam  Constitutional:       Appearance: Normal appearance. Patient is well-developed and normal weight.   HENT:      Head: Normocephalic.      Right Ear: Hearing and external ear normal.      Left Ear: Hearing and external ear normal.      Nose: Nose normal.   Eyes:      Conjunctiva/sclera: Conjunctivae normal.   Cardiovascular:      Rate and Rhythm: Normal rate.   Pulmonary:      Effort: Pulmonary effort is normal.      Breath sounds: No wheezing or rales.   Abdominal:      Palpations: Abdomen is soft.      Tenderness: There is no abdominal tenderness.   Musculoskeletal:      Cervical back: Normal range of motion.   Skin:     Findings: No rash.   Neurological:      Mental Status: Patient is alert and oriented to person, place, and time.   Psychiatric:         Mood and Affect: Mood and affect normal.         Judgment: Judgment normal.     Ortho Exam  Right knee: Skin intact, moderate swelling, tenderness along the joint line and hamstring insertions posteriorly, sensation light touch is " intact, good range of motion with 0 to 120 degrees of flexion, gait antalgic, stable to varus and valgus stress, posterior tib pulses 2+, good range of motion right ankle and able to wiggle all digits.  Result Review :            Imaging Results (Most Recent)     None         Large Joint Arthrocentesis: R knee  Date/Time: 10/18/2021 10:25 AM  Consent given by: patient  Site marked: site marked  Timeout: Immediately prior to procedure a time out was called to verify the correct patient, procedure, equipment, support staff and site/side marked as required   Supporting Documentation  Indications: pain   Procedure Details  Location: knee - R knee  Needle gauge: 21G.  Medications administered: 48 mg hylan 48 MG/6ML  Patient tolerance: patient tolerated the procedure well with no immediate complications            Assessment and Plan    Problem List Items Addressed This Visit        Musculoskeletal and Injuries    Primary localized osteoarthrosis of right lower leg - Primary    Current Assessment & Plan     Patient elected to receive right knee Synvisc injection, risks and benefits were discussed and the patient tolerated this procedure well.  We will follow up in 6 weeks for recheck.  Continue meloxicam, home exercises for tight hamstrings were provided for the patient today.               Follow Up   Return in about 6 weeks (around 11/29/2021) for Recheck.  Patient Instructions   Patient elected to receive right knee Synvisc injection, risks and benefits were discussed and the patient tolerated this procedure well.  We will follow up in 6 weeks for recheck.  Continue meloxicam, home exercises for tight hamstrings were provided for the patient today.    Patient was given instructions and counseling regarding her condition or for health maintenance advice. Please see specific information pulled into the AVS if appropriate.         Yes

## 2021-10-18 NOTE — PATIENT INSTRUCTIONS
Patient elected to receive right knee Synvisc injection, risks and benefits were discussed and the patient tolerated this procedure well.  We will follow up in 6 weeks for recheck.  Continue meloxicam, home exercises for tight hamstrings were provided for the patient today.

## 2021-10-19 ENCOUNTER — APPOINTMENT (OUTPATIENT)
Dept: MAMMOGRAPHY | Facility: HOSPITAL | Age: 71
End: 2021-10-19

## 2021-10-20 DIAGNOSIS — G47.00 INSOMNIA, UNSPECIFIED TYPE: ICD-10-CM

## 2021-10-20 DIAGNOSIS — R60.9 EDEMA, UNSPECIFIED TYPE: ICD-10-CM

## 2021-10-20 RX ORDER — AMITRIPTYLINE HYDROCHLORIDE 100 MG/1
100 TABLET, FILM COATED ORAL NIGHTLY
Qty: 90 TABLET | Refills: 0 | Status: SHIPPED | OUTPATIENT
Start: 2021-10-20 | End: 2021-11-18 | Stop reason: SDUPTHER

## 2021-10-20 RX ORDER — HYDROCHLOROTHIAZIDE 12.5 MG/1
12.5 TABLET ORAL DAILY
Qty: 90 TABLET | Refills: 0 | Status: SHIPPED | OUTPATIENT
Start: 2021-10-20 | End: 2022-04-06 | Stop reason: SDUPTHER

## 2021-10-20 NOTE — TELEPHONE ENCOUNTER
Caller: Suzanne Gutierrez    Relationship: Self    Requested Prescriptions:   hydroCHLOROthiazide (HYDRODIURIL) 12.5 MG tablet  amitriptyline (ELAVIL) 100 MG tablet       Pharmacy where request should be sent:[address] - [phone] [facility] [phone]   [phone]        Best call back number:[phone]    Does the patient have less than a 3 day supply:  [x] Yes  [] No    Richmond Gonzalez Rep   10/20/21 14:02 EDT

## 2021-10-28 ENCOUNTER — HOSPITAL ENCOUNTER (OUTPATIENT)
Dept: MAMMOGRAPHY | Facility: HOSPITAL | Age: 71
Discharge: HOME | End: 2021-10-28
Admitting: NURSE PRACTITIONER

## 2021-10-28 DIAGNOSIS — Z12.31 VISIT FOR SCREENING MAMMOGRAM: ICD-10-CM

## 2021-10-28 PROCEDURE — 77067 SCR MAMMO BI INCL CAD: CPT

## 2021-10-29 NOTE — PROGRESS NOTES
Please mail letter to pt stating:    Suzanne, the mammogram was read as benign mammogram and to continue your yearly screenings and to continue your monthly self breast exams

## 2021-11-01 ENCOUNTER — OFFICE VISIT (OUTPATIENT)
Dept: GASTROENTEROLOGY | Facility: CLINIC | Age: 71
End: 2021-11-01

## 2021-11-01 ENCOUNTER — PREP FOR SURGERY (OUTPATIENT)
Dept: OTHER | Facility: HOSPITAL | Age: 71
End: 2021-11-01

## 2021-11-01 VITALS
DIASTOLIC BLOOD PRESSURE: 71 MMHG | HEART RATE: 76 BPM | HEIGHT: 64 IN | WEIGHT: 216.8 LBS | BODY MASS INDEX: 37.01 KG/M2 | SYSTOLIC BLOOD PRESSURE: 117 MMHG

## 2021-11-01 DIAGNOSIS — K57.92 DIVERTICULITIS: Primary | ICD-10-CM

## 2021-11-01 PROCEDURE — 99214 OFFICE O/P EST MOD 30 MIN: CPT | Performed by: NURSE PRACTITIONER

## 2021-11-01 RX ORDER — POLYETHYLENE GLYCOL 3350, SODIUM SULFATE ANHYDROUS, SODIUM BICARBONATE, SODIUM CHLORIDE, POTASSIUM CHLORIDE 227.1; 21.5; 6.36; 5.53; .754 G/L; G/L; G/L; G/L; G/L
4 POWDER, FOR SOLUTION ORAL DAILY
Qty: 1 EACH | Refills: 0 | Status: SHIPPED | OUTPATIENT
Start: 2021-11-01 | End: 2021-11-02

## 2021-11-01 NOTE — PROGRESS NOTES
Patient Name: Suzanne Gutierrez   Visit Date: 11/01/2021   Patient ID: [id number]  Provider: ALBAN Muniz    Sex: female  Location:  Location Address:  Location Phone: [address]  [place] [address] [phone]    YOB: 1950  Age: 71 y.o.   Primary Care Provider Shelley Poole APRN      Referring Provider: MILES Carpio*        Chief Complaint  Diverticulitis (Pt had ct and labs that confirmed diverticulitis,pt had a cologuard last year)    History of Present Illness  New pt presents to f/u on diverticulitis episdoe in July, pt improved after antibiotics, she has no GI c/o today. NO blood in stool, no unint wt loss, no abd pain, no constipation or diarrhea.  Last colonoscopy 3-4 yrs ago per pt. Cologuard negative last year.     CT the abdomen pelvis without contrast 7/12/2021 showed diverticulitis in the sigmoid colon area fatty liver noted small 1 cm lesion in the liver thought to be a cyst or hemangioma, comparison was made to 2019, unchanged    Sees Dr Mcclellan for PF wears O2 at HS  Denies cardio hx  Past Medical History:   Diagnosis Date   • Insomnia    • Oxygen dependent     wears 02 at hs only   • Pre-diabetes    • Pulmonary fibrosis (HCC)    • Sleep apnea    • Vertigo    • Vertigo    • Visit for screening mammogram 2018       Past Surgical History:   Procedure Laterality Date   • APPENDECTOMY     • CHOLECYSTECTOMY     • HYSTERECTOMY     • TONSILLECTOMY     • TUBAL ABDOMINAL LIGATION         Allergies   Allergen Reactions   • Cephalexin Itching   • Excedrin Migraine [Asa-Apap-Caff Buffered] GI Intolerance       Family History   Problem Relation Age of Onset   • Alcohol abuse Father    • Breast cancer Sister    • Breast cancer Maternal Aunt    • Colon cancer Neg Hx         Social History     Tobacco Use   • Smoking status: Former Smoker     Packs/day: 1.00     Years: 30.00     Pack years: 30.00     Types: Cigarettes     Quit date: 7/8/2004     Years since  "quittin.3   • Smokeless tobacco: Never Used   Vaping Use   • Vaping Use: Never used   Substance Use Topics   • Alcohol use: Never   • Drug use: Never       Objective     Vital Signs:   /71 (BP Location: Left arm, Patient Position: Sitting, Cuff Size: Adult)   Pulse 76   Ht 162.6 cm (64\")   Wt 98.3 kg (216 lb 12.8 oz)   BMI 37.21 kg/m²       Physical Exam  Constitutional:       General: The patient is not in acute distress.     Appearance: Normal appearance.   HENT:      Head: Normocephalic and atraumatic.      Nose: Nose normal.   Pulmonary:      Effort: Pulmonary effort is normal. No respiratory distress.   Abdominal:      General: Abdomen is flat.      Palpations: Abdomen is soft. There is no mass.      Tenderness: There is no abdominal tenderness. There is no guarding.   Musculoskeletal:      Cervical back: Neck supple.      Right lower leg: No edema.      Left lower leg: No edema.   Skin:     General: Skin is warm and dry.   Neurological:      General: No focal deficit present.      Mental Status: The patient is alert and oriented to person, place, and time.      Gait: Gait normal.   Psychiatric:         Mood and Affect: Mood normal.         Speech: Speech normal.         Behavior: Behavior normal.         Thought Content: Thought content normal.     Result Review :   The following data was reviewed by: ALBAN Muniz on 2021:              Assessment and Plan    Diagnoses and all orders for this visit:    1. Diverticulitis (Primary)  Comments:  per CT in 2021    Other orders  -     PEG 3350-KCl-NaBcb-NaCl-NaSulf (Golytely) 227.1 g pack; Take 4 L by mouth Daily for 1 day. Take per office instructions  Dispense: 1 each; Refill: 0            Follow Up      Colonoscopy Surgical Risk and Benefits: Possible risks/complications, benefits, and alternatives to surgical or invasive procedure have been explained to patient and/or legal guardian; Patient has been evaluated and can " tolerate anesthesia and/or sedation. Risks, benefits, and alternatives to anesthesia and sedation have been explained to patient and/or legal guardian.   Clearance Dr Mcclellan  Patient was given instructions and counseling regarding her condition or for health maintenance advice. Please see specific information pulled into the AVS if appropriate.

## 2021-11-02 ENCOUNTER — PATIENT ROUNDING (BHMG ONLY) (OUTPATIENT)
Dept: GASTROENTEROLOGY | Facility: CLINIC | Age: 71
End: 2021-11-02

## 2021-11-02 NOTE — PROGRESS NOTES
November 2, 2021    Hello, may I speak with Suzanne JACOME Matt?    My name is Manuela Ortega    I am  with [facility]  [address]  [place] [address] [phone].    Before we get started may I verify your date of birth? 1950    I am calling to officially welcome you to our practice and ask about your recent visit. Is this a good time to talk? No-Call could not be completed as dialed    Tell me about your visit with us. What things went well?         We're always looking for ways to make our patients' experiences even better. Do you have recommendations on ways we may improve?     Overall were you satisfied with your first visit to our practice?       I appreciate you taking the time to speak with me today. Is there anything else I can do for you?      Thank you, and have a great day.

## 2021-11-11 DIAGNOSIS — R42 DIZZINESS AND GIDDINESS: ICD-10-CM

## 2021-11-11 RX ORDER — MECLIZINE HYDROCHLORIDE 25 MG/1
25 TABLET ORAL 3 TIMES DAILY PRN
Qty: 30 TABLET | Refills: 2 | Status: SHIPPED | OUTPATIENT
Start: 2021-11-11 | End: 2022-01-05

## 2021-11-11 NOTE — TELEPHONE ENCOUNTER
Caller: Matt Suzanne AYAZ    Relationship: Self    Best call back number: [phone]    Requested Prescriptions:   Requested Prescriptions     Pending Prescriptions Disp Refills   • meclizine (ANTIVERT) 25 MG tablet 30 tablet 2        Pharmacy where request should be sent: [facility] #[id number] [place], [place] - [address] AT [facility] - [phone]  - [phone] FX     Does the patient have less than a 3 day supply:  [x] Yes  [] No    Richmond Hahn Rep   11/11/21 11:49 EST

## 2021-11-18 DIAGNOSIS — G47.00 INSOMNIA, UNSPECIFIED TYPE: ICD-10-CM

## 2021-11-18 RX ORDER — AMITRIPTYLINE HYDROCHLORIDE 100 MG/1
100 TABLET, FILM COATED ORAL NIGHTLY
Qty: 90 TABLET | Refills: 0 | Status: SHIPPED | OUTPATIENT
Start: 2021-11-18 | End: 2022-04-06 | Stop reason: SDUPTHER

## 2021-11-18 NOTE — TELEPHONE ENCOUNTER
Caller: Suzanne Gutierrez    Relationship: Self    Best call back number: [phone]    Requested Prescriptions:   amitriptyline (ELAVIL) 100 MG tablet    Pharmacy where request should be sent: [facility] #[id number] - [place], [place] - [address] AT [facility] - [phone]  - [phone] FX     Additional details provided by patient: PATIENT HAS 4 LEFT CURRENTLY. SHE IS REQUESTING A REFILL OF THIS BECAUSE THE NEXT AVAILABLE APPOINTMENT THAT COULD BE OFFERED IS Wednesday, 11/24/2021, BUT SHE CANNOT DO THAT DAY DUE TO GETTING READY FOR THANKSGIVING.     Does the patient have less than a 3 day supply:  [] Yes  [x] No    Richmond De Los Santos Rep   11/18/21 16:46 EST

## 2022-01-05 DIAGNOSIS — R42 DIZZINESS AND GIDDINESS: ICD-10-CM

## 2022-01-05 RX ORDER — MECLIZINE HYDROCHLORIDE 25 MG/1
TABLET ORAL
Qty: 30 TABLET | Refills: 2 | Status: SHIPPED | OUTPATIENT
Start: 2022-01-05 | End: 2022-04-06 | Stop reason: SDUPTHER

## 2022-01-05 RX ORDER — LEVETIRACETAM 500 MG/1
TABLET ORAL
Qty: 60 TABLET | Refills: 5 | OUTPATIENT
Start: 2022-01-05

## 2022-01-05 NOTE — TELEPHONE ENCOUNTER
"Refill denied. 30 day supply +5 refills sent in on 08/30/2021 and she should have enough to last until her f/u on 02/28/2022. Pt can call if out before then \"E-Prescribing Status: Receipt confirmed by pharmacy (8/30/2021  3:15 PM EDT)\"   "

## 2022-01-17 RX ORDER — MELOXICAM 15 MG/1
15 TABLET ORAL DAILY
Qty: 30 TABLET | Refills: 2 | Status: ON HOLD | OUTPATIENT
Start: 2022-01-17 | End: 2022-05-02

## 2022-02-10 DIAGNOSIS — G47.00 INSOMNIA, UNSPECIFIED TYPE: ICD-10-CM

## 2022-02-10 RX ORDER — AMITRIPTYLINE HYDROCHLORIDE 100 MG/1
100 TABLET, FILM COATED ORAL NIGHTLY
Qty: 90 TABLET | Refills: 0 | OUTPATIENT
Start: 2022-02-10

## 2022-02-10 RX ORDER — MELOXICAM 15 MG/1
15 TABLET ORAL DAILY
Qty: 30 TABLET | Refills: 2 | OUTPATIENT
Start: 2022-02-10

## 2022-02-10 NOTE — TELEPHONE ENCOUNTER
Caller: Suzanne Gutierrez    Relationship: Self      Requested Prescriptions:   Requested Prescriptions     Pending Prescriptions Disp Refills   • amitriptyline (ELAVIL) 100 MG tablet 90 tablet 0     Sig: Take 1 tablet by mouth Every Night.   • meloxicam (MOBIC) 15 MG tablet 30 tablet 2     Sig: Take 1 tablet by mouth Daily.        Pharmacy where request should be sent: [facility] #[id number] [place], [place] - [address] AT [facility] [phone] [facility] [phone] FX         Does the patient have less than a 3 day supply:  [x] Yes  [] No    Richmond Jennings Rep   02/10/22 09:34 EST

## 2022-02-17 ENCOUNTER — HOSPITAL ENCOUNTER (OUTPATIENT)
Dept: CT IMAGING | Facility: HOSPITAL | Age: 72
Discharge: HOME | End: 2022-02-17
Admitting: INTERNAL MEDICINE

## 2022-02-17 DIAGNOSIS — J44.9 OBSTRUCTIVE CHRONIC BRONCHITIS WITHOUT EXACERBATION: ICD-10-CM

## 2022-02-17 LAB — CREAT BLDA-MCNC: 0.9 MG/DL

## 2022-02-17 PROCEDURE — 82565 ASSAY OF CREATININE: CPT

## 2022-02-17 PROCEDURE — 0 IOPAMIDOL PER 1 ML: Performed by: INTERNAL MEDICINE

## 2022-02-17 PROCEDURE — 71260 CT THORAX DX C+: CPT

## 2022-02-17 RX ADMIN — IOPAMIDOL 100 ML: 755 INJECTION, SOLUTION INTRAVENOUS at 13:10

## 2022-03-09 ENCOUNTER — OFFICE VISIT (OUTPATIENT)
Dept: FAMILY MEDICINE CLINIC | Facility: CLINIC | Age: 72
End: 2022-03-09

## 2022-03-09 VITALS
DIASTOLIC BLOOD PRESSURE: 64 MMHG | WEIGHT: 221 LBS | HEIGHT: 64 IN | HEART RATE: 92 BPM | TEMPERATURE: 97.7 F | BODY MASS INDEX: 37.73 KG/M2 | SYSTOLIC BLOOD PRESSURE: 124 MMHG | OXYGEN SATURATION: 97 %

## 2022-03-09 DIAGNOSIS — J34.89 DRAINAGE FROM NOSE: ICD-10-CM

## 2022-03-09 DIAGNOSIS — R05.9 COUGH: ICD-10-CM

## 2022-03-09 DIAGNOSIS — J06.9 UPPER RESPIRATORY TRACT INFECTION, UNSPECIFIED TYPE: Primary | ICD-10-CM

## 2022-03-09 PROCEDURE — 87426 SARSCOV CORONAVIRUS AG IA: CPT | Performed by: NURSE PRACTITIONER

## 2022-03-09 PROCEDURE — 99213 OFFICE O/P EST LOW 20 MIN: CPT | Performed by: NURSE PRACTITIONER

## 2022-03-09 RX ORDER — AZITHROMYCIN 250 MG/1
TABLET, FILM COATED ORAL
Qty: 6 TABLET | Refills: 0 | Status: SHIPPED | OUTPATIENT
Start: 2022-03-09 | End: 2022-03-15

## 2022-03-09 RX ORDER — PREGABALIN 100 MG/1
100 CAPSULE ORAL 2 TIMES DAILY
COMMUNITY
Start: 2021-12-31

## 2022-03-09 RX ORDER — TOBRAMYCIN AND DEXAMETHASONE 3; 1 MG/ML; MG/ML
SUSPENSION/ DROPS OPHTHALMIC
COMMUNITY
Start: 2022-01-24 | End: 2022-04-06

## 2022-03-09 NOTE — PROGRESS NOTES
Chief Complaint  Earache (Both ears hurting, no voice, drainage been going on for 2 days)    Subjective        Past Medical History:   Diagnosis Date   • Insomnia    • Oxygen dependent     wears 02 at hs only   • Pre-diabetes    • Pulmonary fibrosis (HCC)    • Sleep apnea    • Vertigo    • Vertigo      Social History     Tobacco Use   • Smoking status: Former Smoker     Packs/day: 1.00     Years: 30.00     Pack years: 30.00     Types: Cigarettes     Quit date: 2004     Years since quittin.6   • Smokeless tobacco: Never Used   Vaping Use   • Vaping Use: Never used   Substance Use Topics   • Alcohol use: Never   • Drug use: Never      Current Outpatient Medications on File Prior to Visit   Medication Sig   • albuterol sulfate  (90 Base) MCG/ACT inhaler inhale 2 puffs EVERY 4 HOURS AS NEEDED increase in directions use spacer AND mask with this inhaler   • amitriptyline (ELAVIL) 100 MG tablet Take 1 tablet by mouth Every Night.   • aspirin 81 MG chewable tablet aspirin 81 mg oral tablet,chewable chew 1 tablet (81 mg) by oral route once daily   Active   • Fluticasone-Umeclidin-Vilant (Trelegy Ellipta) 100-62.5-25 MCG/INH inhaler Inhale 1 puff Daily.   • hydroCHLOROthiazide (HYDRODIURIL) 12.5 MG tablet Take 1 tablet by mouth Daily.   • ipratropium-albuterol (DUO-NEB) 0.5-2.5 mg/3 ml nebulizer ipratropium 0.5 mg-albuterol 3 mg (2.5 mg base)/3 mL nebulization soln   • levETIRAcetam (KEPPRA) 500 MG tablet Take 1 tablet by mouth 2 (Two) Times a Day.   • meclizine (ANTIVERT) 25 MG tablet TAKE 1 TABLET BY MOUTH THREE TIMES DAILY AS NEEDED FOR DIZZINESS OR VERTIGO   • meloxicam (MOBIC) 15 MG tablet Take 1 tablet by mouth Daily.   • pregabalin (LYRICA) 100 MG capsule Take 100 mg by mouth 2 (Two) Times a Day.   • tobramycin-dexamethasone (TOBRADEX) 0.3-0.1 % ophthalmic suspension SHAKE LIQUID AND INSTILL 1 DROP IN LEFT EYE THREE TIMES DAILY   • vitamin D3 125 MCG (5000 UT) capsule capsule cholecalciferol (vitamin  "D3) 125 mcg (5,000 unit) oral capsule take 1 capsule by oral route daily   Active     No current facility-administered medications on file prior to visit.      Allergies   Allergen Reactions   • Cephalexin Itching   • Excedrin Migraine [Asa-Apap-Caff Buffered] GI Intolerance   • Gabapentin Unknown - High Severity   • Naproxen Unknown - High Severity      Health Maintenance Due   Topic Date Due   • TDAP/TD VACCINES (1 - Tdap) Never done   • ZOSTER VACCINE (2 of 3) 08/28/2019   • ANNUAL WELLNESS VISIT  11/24/2021      Suzanne Gutierrez presents to [facility]  Presents today with ear pain worse on the right, loss of voice, drainage down the throat, chills, headaches, sinus pressure, cough with yellow production. Symptoms ongoing x 2 days.     Denies body aches, nausea, vomiting, diarrhea.       Objective   Vital Signs:   /64 (BP Location: Right arm, Patient Position: Sitting, Cuff Size: Adult)   Pulse 92   Temp 97.7 °F (36.5 °C) (Temporal)   Ht 162.6 cm (64\")   Wt 100 kg (221 lb)   SpO2 97%   BMI 37.93 kg/m²     Review of Systems   Physical Exam  Vitals reviewed.   Constitutional:       General: She is not in acute distress.     Appearance: Normal appearance. She is well-developed.   HENT:      Head: Normocephalic and atraumatic.      Right Ear: Tympanic membrane, ear canal and external ear normal.      Left Ear: Tympanic membrane, ear canal and external ear normal.      Nose:      Right Sinus: Frontal sinus tenderness present.      Left Sinus: Frontal sinus tenderness present.      Mouth/Throat:      Pharynx: Posterior oropharyngeal erythema present.   Eyes:      Conjunctiva/sclera: Conjunctivae normal.      Pupils: Pupils are equal, round, and reactive to light.   Cardiovascular:      Rate and Rhythm: Normal rate and regular rhythm.      Heart sounds: Normal heart sounds.   Pulmonary:      Effort: Pulmonary effort is normal.      Breath sounds: Normal breath sounds. "   Musculoskeletal:      Cervical back: Neck supple.      Right lower leg: No edema.      Left lower leg: No edema.   Skin:     General: Skin is warm and dry.   Neurological:      Mental Status: She is alert and oriented to person, place, and time.      Cranial Nerves: No cranial nerve deficit.   Psychiatric:         Mood and Affect: Mood and affect normal.         Behavior: Behavior normal.         Thought Content: Thought content normal.         Judgment: Judgment normal.        Result Review :   The following data was reviewed by: ALBAN Muniz on 03/09/2022:  POCT Covid: negative            Assessment and Plan    Diagnoses and all orders for this visit:    1. Upper respiratory tract infection, unspecified type (Primary)  -     azithromycin (Zithromax Z-Mark) 250 MG tablet; Take 2 tablets by mouth on day 1, then 1 tablet daily on days 2-5  Dispense: 6 tablet; Refill: 0    2. Cough  -     POCT SARS-CoV-2 Antigen EMILY    3. Drainage from nose  -     POCT SARS-CoV-2 Antigen EMILY        Follow Up   No follow-ups on file.  Patient was given instructions and counseling regarding her condition or for health maintenance advice. Please see specific information pulled into the AVS if appropriate.

## 2022-03-14 ENCOUNTER — TRANSCRIBE ORDERS (OUTPATIENT)
Dept: ADMINISTRATIVE | Facility: HOSPITAL | Age: 72
End: 2022-03-14

## 2022-03-14 DIAGNOSIS — R91.1 LUNG NODULE: Primary | ICD-10-CM

## 2022-03-15 ENCOUNTER — OFFICE VISIT (OUTPATIENT)
Dept: NEUROLOGY | Facility: CLINIC | Age: 72
End: 2022-03-15

## 2022-03-15 ENCOUNTER — HOSPITAL ENCOUNTER (OUTPATIENT)
Dept: PET IMAGING | Facility: HOSPITAL | Age: 72
Discharge: HOME | End: 2022-03-15

## 2022-03-15 VITALS
BODY MASS INDEX: 37.88 KG/M2 | DIASTOLIC BLOOD PRESSURE: 66 MMHG | HEART RATE: 85 BPM | WEIGHT: 221.9 LBS | HEIGHT: 64 IN | SYSTOLIC BLOOD PRESSURE: 118 MMHG

## 2022-03-15 DIAGNOSIS — G40.009 PARTIAL IDIOPATHIC EPILEPSY WITH SEIZURES OF LOCALIZED ONSET, NOT INTRACTABLE, WITHOUT STATUS EPILEPTICUS: Primary | ICD-10-CM

## 2022-03-15 DIAGNOSIS — R91.1 LUNG NODULE: ICD-10-CM

## 2022-03-15 PROCEDURE — 78815 PET IMAGE W/CT SKULL-THIGH: CPT

## 2022-03-15 PROCEDURE — A9552 F18 FDG: HCPCS | Performed by: INTERNAL MEDICINE

## 2022-03-15 PROCEDURE — 0 FLUDEOXYGLUCOSE F18 SOLUTION: Performed by: INTERNAL MEDICINE

## 2022-03-15 PROCEDURE — 99213 OFFICE O/P EST LOW 20 MIN: CPT | Performed by: NURSE PRACTITIONER

## 2022-03-15 RX ORDER — LEVETIRACETAM 500 MG/1
500 TABLET ORAL 2 TIMES DAILY
Qty: 60 TABLET | Refills: 5 | Status: SHIPPED | OUTPATIENT
Start: 2022-03-15 | End: 2022-09-14

## 2022-03-15 RX ADMIN — FLUDEOXYGLUCOSE F18 1 DOSE: 300 INJECTION INTRAVENOUS at 08:28

## 2022-03-15 NOTE — PROGRESS NOTES
"Chief Complaint  No chief complaint on file.    Subjective          Suzanne Gutierrez presents to [facility]  Denies seizures since previous visit.  States she's been doing well with Keppra.  Denies side effect.       Objective   Vital Signs:   /66   Pulse 85   Ht 162.6 cm (64\")   Wt 101 kg (221 lb 14.4 oz)   BMI 38.09 kg/m²     Physical Exam  HENT:      Head: Normocephalic.   Pulmonary:      Effort: Pulmonary effort is normal.   Neurological:      Mental Status: She is alert and oriented to person, place, and time.      Cranial Nerves: Cranial nerves are intact.      Sensory: Sensation is intact.      Motor: Motor function is intact.      Coordination: Coordination is intact.      Deep Tendon Reflexes: Reflexes are normal and symmetric.        Neurologic Exam     Mental Status   Oriented to person, place, and time.        Result Review :               Assessment and Plan    Diagnoses and all orders for this visit:    1. Partial idiopathic epilepsy with seizures of localized onset, not intractable, without status epilepticus (HCC) (Primary)  Assessment & Plan:  Continue Keppra.  Discussed routine seizure precautions including, but not limited to, avoiding bathing alone, swimming alone, climbing on ladders.  Also discussed need for medication compliance and risks of unmanaged epilepsy including status epilepticus, permanent brain injury or potentially death.  Patient is aware of [place] state law regarding no driving for 90 days following a seizure.        Other orders  -     levETIRAcetam (KEPPRA) 500 MG tablet; Take 1 tablet by mouth 2 (Two) Times a Day.  Dispense: 60 tablet; Refill: 5      Follow Up   Return in about 6 months (around 9/15/2022) for seizure f/u.  Patient was given instructions and counseling regarding her condition or for health maintenance advice. Please see specific information pulled into the AVS if appropriate.       "

## 2022-03-15 NOTE — PATIENT INSTRUCTIONS
Seizure, Adult  A seizure is a sudden burst of abnormal electrical activity in the brain. Seizures usually last from 30 seconds to 2 minutes. The abnormal activity temporarily interrupts normal brain function.  Many types of seizures can affect adults. A seizure can cause many different symptoms depending on where in the brain it starts.  What are the causes?  Common causes of this condition include:  Fever or infection.  Brain injury, head trauma, bleeding in the brain, or tumor.  Low blood sugar.  Metabolic disorders or other conditions that are passed from parent to child (are inherited).  Reaction to a substance, such as a drug or a medicine, or suddenly stopping the use of a substance (withdrawal).  Stroke.  Developmental disorders such as autism or cerebral palsy.  In some cases, the cause of this condition may not be known. Some people who have a seizure never have another one. Seizures usually do not cause brain damage or permanent problems unless they are prolonged. A person who has repeated seizures over time without a clear cause has a condition called epilepsy.  What increases the risk?  You are more likely to develop this condition if you have:  A family history of epilepsy.  Had a tonic-clonic seizure in the past. This is a type of seizure that involves whole-body contraction of muscles and a loss of consciousness.  A history of head trauma, lack of oxygen at birth, or strokes.  What are the signs or symptoms?  There are many different types of seizures. The symptoms vary depending on the type of seizure you have. Symptoms occur during the seizure and may also occur before a seizure (aura) and after a seizure (postictal).  Symptoms during a seizure  Uncontrollable shaking (convulsions).  Stiffening of the body.  Loss of consciousness.  Head nodding.  Staring.  Not responding to sound or touch.  Loss of bladder or bowel control.  Symptoms before a seizure  Fear or anxiety.  Nausea.  Feeling like the  room is spinning (vertigo).  A feeling of having seen or heard something before (déjà vu).  Odd tastes or smells.  Changes in vision, such as seeing flashing lights or spots.  Symptoms after a seizure  Confusion.  Sleepiness.  Headache.  Weakness on one side of the body.  How is this diagnosed?  This condition may be diagnosed based on:  A description of your symptoms. Video of your seizures can be helpful.  Your medical history.  A physical exam.  You may also have tests, including:  Blood tests.  CT scan.  MRI.  Electroencephalogram (EEG). This test measures electrical activity in the brain. An EEG can predict whether seizures will return (recur).  A spinal tap (also called a lumbar puncture). This is the removal and testing of fluid that surrounds the brain and spinal cord.  How is this treated?  Most seizures will stop on their own in under 5 minutes, and no treatment is needed. Seizures that last longer than 5 minutes will usually need treatment. Treatment can include:  Medicines given through an IV.  Avoiding known triggers, such as medicines that you take for another condition.  Medicines to treat epilepsy (antiepileptics), if epilepsy caused your seizures.  Surgery to stop seizures, if you have epilepsy that does not respond to medicines.  Follow these instructions at home:  Medicines  Take over-the-counter and prescription medicines only as told by your health care provider.  Avoid any substances that may prevent your medicine from working properly, such as alcohol.  Activity  Do not drive, swim, or do any other activities that would be dangerous if you had another seizure. Wait until your health care provider says it is safe to do them.  If you live in the [place] check with your local DMV (department of motor vehicles) to find out about local driving laws. Each state has specific rules about when you can legally return to driving.  Get enough rest. Lack of sleep can make seizures more likely to  occur.  Educating others  Teach friends and family what to do if you have a seizure. They should:  Lay you on the ground to prevent a fall.  Cushion your head and body.  Loosen any tight clothing around your neck.  Turn you on your side. If vomiting occurs, this helps keep your airway clear.  Not hold you down. Holding you down will not stop the seizure.  Not put anything into your mouth.  Know whether or not you need emergency care. For example, they should get help right away if you have a seizure that lasts longer than 5 minutes or have several seizures in a row.  Stay with you until you recover.    General instructions  Contact your health care provider each time you have a seizure.  Avoid anything that has ever triggered a seizure for you.  Keep a seizure diary. Record what you remember about each seizure, especially anything that might have triggered the seizure.  Keep all follow-up visits as told by your health care provider. This is important.  Contact a health care provider if:  You have another seizure.  You have seizures more often.  Your seizure symptoms change.  You continue to have seizures with treatment.  You have symptoms of an infection or illness. This might increase your risk of having a seizure.  Get help right away if:  You have a seizure that:  Lasts longer than 5 minutes.  Is different than previous seizures.  Leaves you unable to speak or use a part of your body.  Makes it harder to breathe.  You have:  A seizure after a head injury.  Multiple seizures in a row.  Confusion or a severe headache right after a seizure.  You do not wake up immediately after a seizure.  You injure yourself during a seizure.  These symptoms may represent a serious problem that is an emergency. Do not wait to see if the symptoms will go away. Get medical help right away. Call your local emergency services (911 in the [place] Do not drive yourself to the hospital.  Summary  Seizures are caused by abnormal electrical  activity in the brain. The activity disrupts normal brain function and can cause various symptoms, such as convulsions, abnormal movements, or a change in consciousness.  There are many causes of seizures, including illnesses, medicines, genetic conditions, head injuries, strokes, tumors, substance abuse, or substance withdrawal.  Most seizures will stop on their own in under 5 minutes. Seizures that last longer than 5 minutes are a medical emergency and require immediate treatment.  Many medicines are used to treat seizures. Take over-the-counter and prescription medicines only as told by your health care provider.  This information is not intended to replace advice given to you by your health care provider. Make sure you discuss any questions you have with your health care provider.  Document Revised: 11/13/2020 Document Reviewed: 11/13/2020  Elsevier Patient Education © 2021 [employer]

## 2022-03-25 ENCOUNTER — TELEPHONE (OUTPATIENT)
Dept: FAMILY MEDICINE CLINIC | Facility: CLINIC | Age: 72
End: 2022-03-25

## 2022-04-06 ENCOUNTER — OFFICE VISIT (OUTPATIENT)
Dept: FAMILY MEDICINE CLINIC | Facility: CLINIC | Age: 72
End: 2022-04-06

## 2022-04-06 VITALS
SYSTOLIC BLOOD PRESSURE: 132 MMHG | HEIGHT: 64 IN | OXYGEN SATURATION: 92 % | BODY MASS INDEX: 37.9 KG/M2 | WEIGHT: 222 LBS | TEMPERATURE: 96.9 F | HEART RATE: 58 BPM | DIASTOLIC BLOOD PRESSURE: 72 MMHG

## 2022-04-06 DIAGNOSIS — R73.03 PRE-DIABETES: ICD-10-CM

## 2022-04-06 DIAGNOSIS — E55.9 VITAMIN D DEFICIENCY: ICD-10-CM

## 2022-04-06 DIAGNOSIS — Z71.85 VACCINE COUNSELING: ICD-10-CM

## 2022-04-06 DIAGNOSIS — Z13.220 SCREENING, LIPID: ICD-10-CM

## 2022-04-06 DIAGNOSIS — E66.01 CLASS 2 SEVERE OBESITY WITH SERIOUS COMORBIDITY AND BODY MASS INDEX (BMI) OF 38.0 TO 38.9 IN ADULT, UNSPECIFIED OBESITY TYPE: ICD-10-CM

## 2022-04-06 DIAGNOSIS — B37.9 YEAST INFECTION: ICD-10-CM

## 2022-04-06 DIAGNOSIS — G47.00 INSOMNIA, UNSPECIFIED TYPE: ICD-10-CM

## 2022-04-06 DIAGNOSIS — R42 VERTIGO: ICD-10-CM

## 2022-04-06 DIAGNOSIS — R60.9 EDEMA, UNSPECIFIED TYPE: Primary | ICD-10-CM

## 2022-04-06 LAB
25(OH)D3 SERPL-MCNC: 33.2 NG/ML (ref 30–100)
ALBUMIN SERPL-MCNC: 4.4 G/DL (ref 3.5–5.2)
ALBUMIN/GLOB SERPL: 1.5 G/DL
ALP SERPL-CCNC: 65 U/L (ref 39–117)
ALT SERPL W P-5'-P-CCNC: 15 U/L (ref 1–33)
ANION GAP SERPL CALCULATED.3IONS-SCNC: 10.7 MMOL/L (ref 5–15)
AST SERPL-CCNC: 20 U/L (ref 1–32)
BILIRUB SERPL-MCNC: 0.3 MG/DL (ref 0–1.2)
BUN SERPL-MCNC: 20 MG/DL (ref 8–23)
BUN/CREAT SERPL: 23 (ref 7–25)
CALCIUM SPEC-SCNC: 9.6 MG/DL (ref 8.6–10.5)
CHLORIDE SERPL-SCNC: 101 MMOL/L (ref 98–107)
CHOLEST SERPL-MCNC: 239 MG/DL (ref 0–200)
CO2 SERPL-SCNC: 27.3 MMOL/L (ref 22–29)
CREAT SERPL-MCNC: 0.87 MG/DL (ref 0.57–1)
DEPRECATED RDW RBC AUTO: 44.1 FL (ref 37–54)
EGFRCR SERPLBLD CKD-EPI 2021: 71.3 ML/MIN/1.73
ERYTHROCYTE [DISTWIDTH] IN BLOOD BY AUTOMATED COUNT: 12.7 % (ref 12.3–15.4)
GLOBULIN UR ELPH-MCNC: 2.9 GM/DL
GLUCOSE SERPL-MCNC: 82 MG/DL (ref 65–99)
HBA1C MFR BLD: 5.9 % (ref 4.8–5.6)
HCT VFR BLD AUTO: 41.8 % (ref 34–46.6)
HDLC SERPL-MCNC: 55 MG/DL (ref 40–60)
HGB BLD-MCNC: 13.3 G/DL (ref 12–15.9)
LDLC SERPL CALC-MCNC: 154 MG/DL (ref 0–100)
LDLC/HDLC SERPL: 2.75 {RATIO}
MCH RBC QN AUTO: 29.7 PG (ref 26.6–33)
MCHC RBC AUTO-ENTMCNC: 31.8 G/DL (ref 31.5–35.7)
MCV RBC AUTO: 93.3 FL (ref 79–97)
PLATELET # BLD AUTO: 207 10*3/MM3 (ref 140–450)
PMV BLD AUTO: 10.8 FL (ref 6–12)
POTASSIUM SERPL-SCNC: 4.6 MMOL/L (ref 3.5–5.2)
PROT SERPL-MCNC: 7.3 G/DL (ref 6–8.5)
RBC # BLD AUTO: 4.48 10*6/MM3 (ref 3.77–5.28)
SODIUM SERPL-SCNC: 139 MMOL/L (ref 136–145)
TRIGL SERPL-MCNC: 164 MG/DL (ref 0–150)
TSH SERPL DL<=0.05 MIU/L-ACNC: 3.68 UIU/ML (ref 0.27–4.2)
VLDLC SERPL-MCNC: 30 MG/DL (ref 5–40)
WBC NRBC COR # BLD: 7.32 10*3/MM3 (ref 3.4–10.8)

## 2022-04-06 PROCEDURE — 99214 OFFICE O/P EST MOD 30 MIN: CPT | Performed by: NURSE PRACTITIONER

## 2022-04-06 PROCEDURE — 80053 COMPREHEN METABOLIC PANEL: CPT | Performed by: NURSE PRACTITIONER

## 2022-04-06 PROCEDURE — 85027 COMPLETE CBC AUTOMATED: CPT | Performed by: NURSE PRACTITIONER

## 2022-04-06 PROCEDURE — 83036 HEMOGLOBIN GLYCOSYLATED A1C: CPT | Performed by: NURSE PRACTITIONER

## 2022-04-06 PROCEDURE — 90471 IMMUNIZATION ADMIN: CPT | Performed by: NURSE PRACTITIONER

## 2022-04-06 PROCEDURE — 84443 ASSAY THYROID STIM HORMONE: CPT | Performed by: NURSE PRACTITIONER

## 2022-04-06 PROCEDURE — 80061 LIPID PANEL: CPT | Performed by: NURSE PRACTITIONER

## 2022-04-06 PROCEDURE — 82306 VITAMIN D 25 HYDROXY: CPT | Performed by: NURSE PRACTITIONER

## 2022-04-06 PROCEDURE — 36415 COLL VENOUS BLD VENIPUNCTURE: CPT | Performed by: NURSE PRACTITIONER

## 2022-04-06 PROCEDURE — 90715 TDAP VACCINE 7 YRS/> IM: CPT | Performed by: NURSE PRACTITIONER

## 2022-04-06 RX ORDER — MECLIZINE HYDROCHLORIDE 25 MG/1
25 TABLET ORAL 3 TIMES DAILY PRN
Qty: 90 TABLET | Refills: 2 | Status: SHIPPED | OUTPATIENT
Start: 2022-04-06 | End: 2022-07-22 | Stop reason: SDUPTHER

## 2022-04-06 RX ORDER — FLUCONAZOLE 150 MG/1
150 TABLET ORAL ONCE
Qty: 1 TABLET | Refills: 0 | Status: SHIPPED | OUTPATIENT
Start: 2022-04-06 | End: 2022-04-06

## 2022-04-06 RX ORDER — HYDROCHLOROTHIAZIDE 12.5 MG/1
12.5 TABLET ORAL DAILY
Qty: 90 TABLET | Refills: 1 | Status: SHIPPED | OUTPATIENT
Start: 2022-04-06 | End: 2022-04-22

## 2022-04-06 RX ORDER — AMITRIPTYLINE HYDROCHLORIDE 100 MG/1
100 TABLET, FILM COATED ORAL NIGHTLY
Qty: 90 TABLET | Refills: 1 | Status: SHIPPED | OUTPATIENT
Start: 2022-04-06 | End: 2022-10-17

## 2022-04-06 NOTE — PROGRESS NOTES
Venipuncture Blood Specimen Collection  Venipuncture performed in LEFT ARM  by Violeta Amor with good hemostasis. Patient tolerated the procedure well without complications.   04/06/22   Violeta Amor

## 2022-04-06 NOTE — PROGRESS NOTES
Chief Complaint  Med Refill (Patient may have a yeast infection also.  Care gap annual visit, shots)    Subjective            Suzanne Gutierrez presents to [facility]  Patient is here today for her chronic comorbid medication refills for those comorbidities and she will need labs as well and she also reports that she may have a yeast infection as well    Patient does continue seeing pulmonary for her lung condition--pt reports is worsening and sees Dr Mcclellan and he just did recent scans on her lungs--F/U 14th this month     And also sees neurology as well--for the prior Dx elilepsy but no breakthrough SZ     With regards to the edema: Patient uses HCTZ and tolerates very well and causes no side effects no issues    Regarding the insomnia: Does well on the Elavil has been on this for many years no side effects no issues reported no SI/HI    Prediabetes we will obtain the A1c today as patient is fasting    Longstanding intermittent vertigo and patient has been thoroughly evaluated by neurology and ENT in the past and just uses the as needed use of the meclizine as needed    Obese with a BMI of 38.11: Patient is attempting to do dietary changes and stay as active as her lungs allow her to be in an attempt to lose weight      PHQ-2 Total Score: 0  PHQ-9 Total Score: 0    Past Medical History:   Diagnosis Date   • Insomnia    • Oxygen dependent     wears 02 at hs only   • Pre-diabetes    • Pulmonary fibrosis (HCC)    • Sleep apnea    • Vertigo    • Vertigo        Allergies   Allergen Reactions   • Cephalexin Itching   • Excedrin Migraine [Asa-Apap-Caff Buffered] GI Intolerance   • Gabapentin Unknown - High Severity   • Naproxen Unknown - High Severity        Past Surgical History:   Procedure Laterality Date   • APPENDECTOMY     • CHOLECYSTECTOMY     • HYSTERECTOMY     • TONSILLECTOMY     • TUBAL ABDOMINAL LIGATION          Social History     Tobacco Use   • Smoking status: Former Smoker      Packs/day: 1.00     Years: 30.00     Pack years: 30.00     Types: Cigarettes     Quit date: 2004     Years since quittin.7   • Smokeless tobacco: Never Used   Vaping Use   • Vaping Use: Never used   Substance Use Topics   • Alcohol use: Never   • Drug use: Never       Family History   Problem Relation Age of Onset   • Alcohol abuse Father    • Breast cancer Sister    • Breast cancer Maternal Aunt    • Colon cancer Neg Hx         Health Maintenance Due   Topic Date Due   • ZOSTER VACCINE (2 of 3) 2019   • ANNUAL WELLNESS VISIT  2021        Current Outpatient Medications on File Prior to Visit   Medication Sig   • albuterol sulfate  (90 Base) MCG/ACT inhaler inhale 2 puffs EVERY 4 HOURS AS NEEDED increase in directions use spacer AND mask with this inhaler   • aspirin 81 MG chewable tablet aspirin 81 mg oral tablet,chewable chew 1 tablet (81 mg) by oral route once daily   Active   • Fluticasone-Umeclidin-Vilant (Trelegy Ellipta) 100-62.5-25 MCG/INH inhaler Inhale 1 puff Daily.   • ipratropium-albuterol (DUO-NEB) 0.5-2.5 mg/3 ml nebulizer ipratropium 0.5 mg-albuterol 3 mg (2.5 mg base)/3 mL nebulization soln   • levETIRAcetam (KEPPRA) 500 MG tablet Take 1 tablet by mouth 2 (Two) Times a Day.   • meloxicam (MOBIC) 15 MG tablet Take 1 tablet by mouth Daily.   • pregabalin (LYRICA) 100 MG capsule Take 100 mg by mouth 2 (Two) Times a Day.   • vitamin D3 125 MCG (5000 UT) capsule capsule cholecalciferol (vitamin D3) 125 mcg (5,000 unit) oral capsule take 1 capsule by oral route daily   Active   • [DISCONTINUED] amitriptyline (ELAVIL) 100 MG tablet Take 1 tablet by mouth Every Night.   • [DISCONTINUED] hydroCHLOROthiazide (HYDRODIURIL) 12.5 MG tablet Take 1 tablet by mouth Daily.   • [DISCONTINUED] meclizine (ANTIVERT) 25 MG tablet TAKE 1 TABLET BY MOUTH THREE TIMES DAILY AS NEEDED FOR DIZZINESS OR VERTIGO   • [DISCONTINUED] tobramycin-dexamethasone (TOBRADEX) 0.3-0.1 % ophthalmic suspension  "SHAKE LIQUID AND INSTILL 1 DROP IN LEFT EYE THREE TIMES DAILY     No current facility-administered medications on file prior to visit.       Immunization History   Administered Date(s) Administered   • COVID-19 (MODERNA) 1st, 2nd, 3rd Dose Only 12/15/2021   • COVID-19 (MODERNA) BOOSTER 12/15/2021   • COVID-19 (PFIZER) PURPLE CAP 05/07/2021, 06/01/2021   • Fluzone High-Dose 65+yrs 10/02/2021   • Influenza Quad Vaccine (Inpatient) 09/12/2014   • PPD Test 07/15/2021   • Pneumococcal Conjugate 13-Valent (PCV13) 06/19/2017   • Pneumococcal Polysaccharide (PPSV23) 07/03/2019   • Tdap 04/06/2022   • Zostavax 06/19/2017, 07/03/2019       Review of Systems   Constitutional: Positive for fatigue.        Not worse than normal    Eyes: Negative.    Respiratory: Positive for shortness of breath.         R/T chronic lung Dz   Cardiovascular: Negative for chest pain.   Gastrointestinal: Negative for abdominal pain and blood in stool.   Endocrine: Negative.    Genitourinary: Positive for vaginal discharge. Negative for dysuria.        And itching and used OTC --recent antibiotic use    Musculoskeletal: Negative.  Negative for arthralgias, back pain and neck pain.   Skin: Negative.    Allergic/Immunologic: Negative.    Neurological: Negative for dizziness, seizures, syncope and light-headedness.   Psychiatric/Behavioral: Positive for sleep disturbance. Negative for self-injury, suicidal ideas and depressed mood. The patient is not nervous/anxious.         Objective     /72 (BP Location: Right arm, Patient Position: Sitting, Cuff Size: Adult)   Pulse 58   Temp 96.9 °F (36.1 °C) (Temporal)   Ht 162.6 cm (64\")   Wt 101 kg (222 lb)   SpO2 92%   BMI 38.11 kg/m²       Physical Exam  Vitals and nursing note reviewed.   Constitutional:       Appearance: Normal appearance. She is obese.   HENT:      Head: Normocephalic.      Right Ear: External ear normal.      Left Ear: External ear normal.      Nose: Nose normal.      " Mouth/Throat:      Comments: Wearing mask  Eyes:      Pupils: Pupils are equal, round, and reactive to light.   Cardiovascular:      Rate and Rhythm: Normal rate and regular rhythm.      Heart sounds: Normal heart sounds.   Pulmonary:      Effort: Pulmonary effort is normal.      Breath sounds: Normal breath sounds.   Abdominal:      General: Bowel sounds are normal.      Palpations: Abdomen is soft.      Tenderness: There is no abdominal tenderness.   Musculoskeletal:         General: Normal range of motion.      Cervical back: Normal range of motion and neck supple.      Right lower le+ Pitting Edema present.      Left lower le+ Pitting Edema present.   Skin:     General: Skin is warm and dry.   Neurological:      Mental Status: She is alert and oriented to person, place, and time.   Psychiatric:         Mood and Affect: Mood normal.         Behavior: Behavior normal.         Thought Content: Thought content normal.         Judgment: Judgment normal.         Result Review :{Labs  Result Review  Imaging  Med Tab  Media :23}                          Assessment and Plan      Diagnoses and all orders for this visit:    1. Edema, unspecified type (Primary)  -     hydroCHLOROthiazide (HYDRODIURIL) 12.5 MG tablet; Take 1 tablet by mouth Daily.  Dispense: 90 tablet; Refill: 1  -     Comprehensive Metabolic Panel    2. Pre-diabetes  -     Lipid Panel  -     Comprehensive Metabolic Panel  -     Hemoglobin A1c    3. Insomnia, unspecified type  -     amitriptyline (ELAVIL) 100 MG tablet; Take 1 tablet by mouth Every Night.  Dispense: 90 tablet; Refill: 1  -     Comprehensive Metabolic Panel  -     TSH    4. Vertigo  -     meclizine (ANTIVERT) 25 MG tablet; Take 1 tablet by mouth 3 (Three) Times a Day As Needed for Dizziness.  Dispense: 90 tablet; Refill: 2  -     Lipid Panel  -     CBC (No Diff)  -     TSH    5. Vitamin D deficiency  -     Vitamin D 25 Hydroxy    6. Yeast infection  -     fluconazole (Diflucan)  150 MG tablet; Take 1 tablet by mouth 1 (One) Time for 1 dose.  Dispense: 1 tablet; Refill: 0    7. Screening, lipid  -     Lipid Panel    8. Class 2 severe obesity with serious comorbidity and body mass index (BMI) of 38.0 to 38.9 in adult, unspecified obesity type (HCC)  -     Lipid Panel    9. Vaccine counseling  -     Tdap Vaccine Greater Than or Equal To 8yo IM            Follow Up     Return in about 6 months (around 10/6/2022), or if symptoms worsen or fail to improve, for Medicare Wellness, Recheck.

## 2022-04-07 NOTE — PROGRESS NOTES
Please mail letter to patient stating    Suzanne the vitamin D was in the low end of normal so please make sure you are taking a vitamin D3 1000 IUs daily to maintain this; thyroid levels were normal range; you are still in the prediabetic range slightly better than last time 5.9% and it should be less than 5.7%; cholesterol levels show an elevated triglyceride level of 164 and it should be less than 150 normal HDL and your LDL was quite elevated at 154 and it should be less than 100 so you need to go on a low-cholesterol diet make sure you are getting exercise like walking 30 to 60 minutes daily and we will repeat this in 3 to 6 months you may need to start a cholesterol medicine if it still remains elevated    Comprehensive panel shows normal glucose, normal kidney and liver function tests, and normal electrolytes and all of the blood counts were completely normal

## 2022-04-13 ENCOUNTER — TELEPHONE (OUTPATIENT)
Dept: FAMILY MEDICINE CLINIC | Facility: CLINIC | Age: 72
End: 2022-04-13

## 2022-04-13 DIAGNOSIS — B37.9 YEAST INFECTION: Primary | ICD-10-CM

## 2022-04-13 DIAGNOSIS — G47.00 INSOMNIA, UNSPECIFIED TYPE: ICD-10-CM

## 2022-04-13 RX ORDER — FLUCONAZOLE 100 MG/1
100 TABLET ORAL DAILY
Qty: 1 TABLET | Refills: 0 | Status: ON HOLD | OUTPATIENT
Start: 2022-04-13 | End: 2022-05-02

## 2022-04-13 RX ORDER — AMITRIPTYLINE HYDROCHLORIDE 100 MG/1
100 TABLET, FILM COATED ORAL NIGHTLY
Qty: 90 TABLET | Refills: 1 | OUTPATIENT
Start: 2022-04-13

## 2022-04-13 NOTE — TELEPHONE ENCOUNTER
Caller: Suzanne Gutierrez    Relationship: Self    Best call back number: [phone]    Requested Prescriptions:   Requested Prescriptions     Pending Prescriptions Disp Refills   • amitriptyline (ELAVIL) 100 MG tablet 90 tablet 1     Sig: Take 1 tablet by mouth Every Night.        Pharmacy where request should be sent: [facility] - [address] [phone] [facility] [phone]      Additional details provided by patient:PATIENT GETS THIS THROUGH MAIL ORDER      Richmond Gimenez   04/13/22 10:10 EDT

## 2022-04-22 ENCOUNTER — OFFICE VISIT (OUTPATIENT)
Dept: FAMILY MEDICINE CLINIC | Facility: CLINIC | Age: 72
End: 2022-04-22

## 2022-04-22 ENCOUNTER — HOSPITAL ENCOUNTER (OUTPATIENT)
Dept: GENERAL RADIOLOGY | Facility: HOSPITAL | Age: 72
Discharge: HOME | End: 2022-04-22

## 2022-04-22 VITALS
DIASTOLIC BLOOD PRESSURE: 60 MMHG | BODY MASS INDEX: 37.93 KG/M2 | OXYGEN SATURATION: 95 % | SYSTOLIC BLOOD PRESSURE: 96 MMHG | HEART RATE: 87 BPM | WEIGHT: 221 LBS | TEMPERATURE: 97.5 F

## 2022-04-22 DIAGNOSIS — R53.83 OTHER FATIGUE: ICD-10-CM

## 2022-04-22 DIAGNOSIS — R09.81 NASAL CONGESTION: ICD-10-CM
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
## 2022-04-22 DIAGNOSIS — J02.9 SORE THROAT: Primary | ICD-10-CM

## 2022-04-22 DIAGNOSIS — R51.9 NONINTRACTABLE HEADACHE, UNSPECIFIED CHRONICITY PATTERN, UNSPECIFIED HEADACHE TYPE: ICD-10-CM

## 2022-04-22 LAB
ALBUMIN SERPL-MCNC: 3.9 G/DL (ref 3.5–5.2)
ALBUMIN/GLOB SERPL: 1.2 G/DL
ALP SERPL-CCNC: 66 U/L (ref 39–117)
ALT SERPL W P-5'-P-CCNC: 12 U/L (ref 1–33)
ANION GAP SERPL CALCULATED.3IONS-SCNC: 12.7 MMOL/L (ref 5–15)
AST SERPL-CCNC: 17 U/L (ref 1–32)
BASOPHILS # BLD AUTO: 0.06 10*3/MM3 (ref 0–0.2)
BASOPHILS NFR BLD AUTO: 0.5 % (ref 0–1.5)
BILIRUB SERPL-MCNC: 0.5 MG/DL (ref 0–1.2)
BUN SERPL-MCNC: 15 MG/DL (ref 8–23)
BUN/CREAT SERPL: 18.1 (ref 7–25)
CALCIUM SPEC-SCNC: 9.2 MG/DL (ref 8.6–10.5)
CHLORIDE SERPL-SCNC: 101 MMOL/L (ref 98–107)
CO2 SERPL-SCNC: 24.3 MMOL/L (ref 22–29)
CREAT SERPL-MCNC: 0.83 MG/DL (ref 0.57–1)
DEPRECATED RDW RBC AUTO: 42.4 FL (ref 37–54)
EGFRCR SERPLBLD CKD-EPI 2021: 75.5 ML/MIN/1.73
EOSINOPHIL # BLD AUTO: 0.23 10*3/MM3 (ref 0–0.4)
EOSINOPHIL NFR BLD AUTO: 2 % (ref 0.3–6.2)
ERYTHROCYTE [DISTWIDTH] IN BLOOD BY AUTOMATED COUNT: 12.8 % (ref 12.3–15.4)
EXPIRATION DATE: NORMAL
FLUAV AG NPH QL: NEGATIVE
FLUBV AG NPH QL: NEGATIVE
GLOBULIN UR ELPH-MCNC: 3.3 GM/DL
GLUCOSE SERPL-MCNC: 112 MG/DL (ref 65–99)
HCT VFR BLD AUTO: 38.5 % (ref 34–46.6)
HGB BLD-MCNC: 12.7 G/DL (ref 12–15.9)
IMM GRANULOCYTES # BLD AUTO: 0.04 10*3/MM3 (ref 0–0.05)
IMM GRANULOCYTES NFR BLD AUTO: 0.3 % (ref 0–0.5)
INTERNAL CONTROL: NORMAL
LYMPHOCYTES # BLD AUTO: 1.49 10*3/MM3 (ref 0.7–3.1)
LYMPHOCYTES NFR BLD AUTO: 12.7 % (ref 19.6–45.3)
Lab: NORMAL
MAGNESIUM SERPL-MCNC: 1.8 MG/DL (ref 1.6–2.4)
MCH RBC QN AUTO: 30.1 PG (ref 26.6–33)
MCHC RBC AUTO-ENTMCNC: 33 G/DL (ref 31.5–35.7)
MCV RBC AUTO: 91.2 FL (ref 79–97)
MONOCYTES # BLD AUTO: 1.05 10*3/MM3 (ref 0.1–0.9)
MONOCYTES NFR BLD AUTO: 9 % (ref 5–12)
NEUTROPHILS NFR BLD AUTO: 75.5 % (ref 42.7–76)
NEUTROPHILS NFR BLD AUTO: 8.85 10*3/MM3 (ref 1.7–7)
NRBC BLD AUTO-RTO: 0 /100 WBC (ref 0–0.2)
PLATELET # BLD AUTO: 198 10*3/MM3 (ref 140–450)
PMV BLD AUTO: 10.6 FL (ref 6–12)
POTASSIUM SERPL-SCNC: 4.2 MMOL/L (ref 3.5–5.2)
PROT SERPL-MCNC: 7.2 G/DL (ref 6–8.5)
RBC # BLD AUTO: 4.22 10*6/MM3 (ref 3.77–5.28)
S PYO AG THROAT QL: NEGATIVE
SARS-COV-2 AG UPPER RESP QL IA.RAPID: NOT DETECTED
SODIUM SERPL-SCNC: 138 MMOL/L (ref 136–145)
WBC NRBC COR # BLD: 11.72 10*3/MM3 (ref 3.4–10.8)

## 2022-04-22 PROCEDURE — 87804 INFLUENZA ASSAY W/OPTIC: CPT | Performed by: FAMILY MEDICINE

## 2022-04-22 PROCEDURE — 87426 SARSCOV CORONAVIRUS AG IA: CPT | Performed by: FAMILY MEDICINE

## 2022-04-22 PROCEDURE — 87880 STREP A ASSAY W/OPTIC: CPT | Performed by: FAMILY MEDICINE

## 2022-04-22 PROCEDURE — 70100 X-RAY EXAM OF JAW <4VIEWS: CPT

## 2022-04-22 PROCEDURE — 99214 OFFICE O/P EST MOD 30 MIN: CPT | Performed by: FAMILY MEDICINE

## 2022-04-22 PROCEDURE — 73560 X-RAY EXAM OF KNEE 1 OR 2: CPT

## 2022-04-22 PROCEDURE — 83735 ASSAY OF MAGNESIUM: CPT | Performed by: FAMILY MEDICINE

## 2022-04-22 PROCEDURE — 36415 COLL VENOUS BLD VENIPUNCTURE: CPT | Performed by: FAMILY MEDICINE

## 2022-04-22 PROCEDURE — 80053 COMPREHEN METABOLIC PANEL: CPT | Performed by: FAMILY MEDICINE

## 2022-04-22 PROCEDURE — 85025 COMPLETE CBC W/AUTO DIFF WBC: CPT | Performed by: FAMILY MEDICINE

## 2022-04-22 RX ORDER — CLINDAMYCIN HYDROCHLORIDE 300 MG/1
300 CAPSULE ORAL 4 TIMES DAILY
Qty: 28 CAPSULE | Refills: 0 | Status: SHIPPED | OUTPATIENT
Start: 2022-04-22 | End: 2022-04-29

## 2022-04-22 RX ORDER — SACCHAROMYCES BOULARDII 250 MG
250 CAPSULE ORAL 2 TIMES DAILY
Qty: 20 CAPSULE | Refills: 0 | Status: SHIPPED | OUTPATIENT
Start: 2022-04-22 | End: 2022-05-02

## 2022-04-22 RX ORDER — PREDNISONE 20 MG/1
40 TABLET ORAL DAILY
Qty: 10 TABLET | Refills: 0 | Status: SHIPPED | OUTPATIENT
Start: 2022-04-22 | End: 2022-04-27

## 2022-04-22 NOTE — PROGRESS NOTES
Call pt:  Mandible and knees show no fractures.  Use ice, as needed.  Follow-up in 1 week if not better.

## 2022-04-22 NOTE — PROGRESS NOTES
Chief Complaint  Nasal Congestion (Facial pain), Fatigue, Sore Throat, Headache, and Chills    Subjective          uSzanne Gutierrez presents to [facility]  Pt fell this AM in bathroom and hit chin and both knees- no head injury, no LOC, no headache    URI   This is a new problem. Episode onset: 3 days. The problem has been gradually worsening. There has been no fever. Associated symptoms include congestion, coughing, headaches, sinus pain and a sore throat. Pertinent negatives include no abdominal pain, chest pain, diarrhea, dysuria, ear pain, joint pain, joint swelling, nausea, neck pain, plugged ear sensation, rash, rhinorrhea, sneezing, swollen glands, vomiting or wheezing. She has tried nothing for the symptoms.       Objective   Allergies   Allergen Reactions   • Cephalexin Itching   • Excedrin Migraine [Asa-Apap-Caff Buffered] GI Intolerance   • Gabapentin Unknown - High Severity   • Naproxen Unknown - High Severity     Immunization History   Administered Date(s) Administered   • COVID-19 (MODERNA) 1st, 2nd, 3rd Dose Only 12/15/2021   • COVID-19 (MODERNA) BOOSTER 12/15/2021   • COVID-19 (PFIZER) PURPLE CAP 05/07/2021, 06/01/2021   • Fluzone High-Dose 65+yrs 10/02/2021   • Influenza Quad Vaccine (Inpatient) 09/12/2014   • PPD Test 07/15/2021   • Pneumococcal Conjugate 13-Valent (PCV13) 06/19/2017   • Pneumococcal Polysaccharide (PPSV23) 07/03/2019   • Tdap 04/06/2022   • Zostavax 06/19/2017, 07/03/2019     Past Medical History:   Diagnosis Date   • Insomnia    • Oxygen dependent     wears 02 at hs only   • Pre-diabetes    • Pulmonary fibrosis (HCC)    • Sleep apnea    • Vertigo    • Vertigo       Past Surgical History:   Procedure Laterality Date   • APPENDECTOMY     • CHOLECYSTECTOMY     • HYSTERECTOMY     • TONSILLECTOMY     • TUBAL ABDOMINAL LIGATION        Social History     Socioeconomic History   • Marital status:    Tobacco Use   • Smoking status: Former Smoker      Packs/day: 1.00     Years: 30.00     Pack years: 30.00     Types: Cigarettes     Quit date: 2004     Years since quittin.8   • Smokeless tobacco: Never Used   Vaping Use   • Vaping Use: Never used   Substance and Sexual Activity   • Alcohol use: Never   • Drug use: Never   • Sexual activity: Defer        Current Outpatient Medications:   •  albuterol sulfate  (90 Base) MCG/ACT inhaler, inhale 2 puffs EVERY 4 HOURS AS NEEDED increase in directions use spacer AND mask with this inhaler, Disp: , Rfl:   •  amitriptyline (ELAVIL) 100 MG tablet, Take 1 tablet by mouth Every Night., Disp: 90 tablet, Rfl: 1  •  aspirin 81 MG chewable tablet, aspirin 81 mg oral tablet,chewable chew 1 tablet (81 mg) by oral route once daily   Active, Disp: , Rfl:   •  fluconazole (Diflucan) 100 MG tablet, Take 1 tablet by mouth Daily., Disp: 1 tablet, Rfl: 0  •  Fluticasone-Umeclidin-Vilant (Trelegy Ellipta) 100-62.5-25 MCG/INH inhaler, Inhale 1 puff Daily., Disp: , Rfl:   •  ipratropium-albuterol (DUO-NEB) 0.5-2.5 mg/3 ml nebulizer, ipratropium 0.5 mg-albuterol 3 mg (2.5 mg base)/3 mL nebulization soln, Disp: , Rfl:   •  levETIRAcetam (KEPPRA) 500 MG tablet, Take 1 tablet by mouth 2 (Two) Times a Day., Disp: 60 tablet, Rfl: 5  •  meclizine (ANTIVERT) 25 MG tablet, Take 1 tablet by mouth 3 (Three) Times a Day As Needed for Dizziness., Disp: 90 tablet, Rfl: 2  •  meloxicam (MOBIC) 15 MG tablet, Take 1 tablet by mouth Daily., Disp: 30 tablet, Rfl: 2  •  pregabalin (LYRICA) 100 MG capsule, Take 100 mg by mouth 2 (Two) Times a Day., Disp: , Rfl:   •  vitamin D3 125 MCG (5000 UT) capsule capsule, cholecalciferol (vitamin D3) 125 mcg (5,000 unit) oral capsule take 1 capsule by oral route daily   Active, Disp: , Rfl:   •  clindamycin (Cleocin) 300 MG capsule, Take 1 capsule by mouth 4 (Four) Times a Day for 7 days., Disp: 28 capsule, Rfl: 0  •  predniSONE (DELTASONE) 20 MG tablet, Take 2 tablets by mouth Daily for 5 days., Disp: 10  tablet, Rfl: 0  •  saccharomyces boulardii (Florastor) 250 MG capsule, Take 1 capsule by mouth 2 (Two) Times a Day for 10 days., Disp: 20 capsule, Rfl: 0   Family History   Problem Relation Age of Onset   • Alcohol abuse Father    • Breast cancer Sister    • Breast cancer Maternal Aunt    • Colon cancer Neg Hx           Vital Signs:   Vitals:    04/22/22 1455   BP: 96/60   Pulse: 87   Temp: 97.5 °F (36.4 °C)   SpO2: 95%   Weight: 100 kg (221 lb)       Review of Systems   Constitutional: Negative for fatigue and fever.   HENT: Positive for congestion, sinus pain and sore throat. Negative for ear pain, rhinorrhea and sneezing.    Eyes: Negative for visual disturbance.   Respiratory: Positive for cough. Negative for wheezing.    Cardiovascular: Negative for chest pain.   Gastrointestinal: Negative for abdominal pain, diarrhea, nausea and vomiting.   Genitourinary: Negative for dysuria.   Musculoskeletal: Negative for joint pain and neck pain.   Skin: Negative for rash.   Neurological: Positive for headaches. Negative for dizziness, syncope and light-headedness.   Psychiatric/Behavioral: Negative for behavioral problems, decreased concentration and dysphoric mood. The patient is not nervous/anxious.       Physical Exam  Vitals reviewed.   Constitutional:       Appearance: Normal appearance. She is well-developed.   HENT:      Head: Normocephalic and atraumatic.      Comments: Bilateral maxillary sinus tenderness.     Right Ear: Tympanic membrane, ear canal and external ear normal.      Left Ear: Tympanic membrane, ear canal and external ear normal.      Nose: Nose normal.      Mouth/Throat:      Mouth: Mucous membranes are moist.      Pharynx: Oropharynx is clear. Posterior oropharyngeal erythema present. No oropharyngeal exudate.   Eyes:      Conjunctiva/sclera: Conjunctivae normal.      Pupils: Pupils are equal, round, and reactive to light.   Cardiovascular:      Rate and Rhythm: Normal rate and regular rhythm.       Pulses: Normal pulses.      Heart sounds: Normal heart sounds. No murmur heard.    No friction rub. No gallop.   Pulmonary:      Effort: Pulmonary effort is normal.      Breath sounds: Normal breath sounds. No wheezing or rhonchi.   Abdominal:      General: Abdomen is flat. Bowel sounds are normal. There is no distension.      Palpations: Abdomen is soft. There is no mass.      Tenderness: There is no abdominal tenderness. There is no guarding or rebound.      Hernia: No hernia is present.   Musculoskeletal:         General: Normal range of motion.      Cervical back: Normal range of motion and neck supple.      Comments: Bilateral generalized knee tenderness, normal ROM, stable, no swelling, redness, bruising, warmth, stable.    Chin slightly tender, no redness, warmth, bruising, or swelling.   Skin:     General: Skin is warm and dry.      Capillary Refill: Capillary refill takes less than 2 seconds.   Neurological:      General: No focal deficit present.      Mental Status: She is alert and oriented to person, place, and time.      Cranial Nerves: No cranial nerve deficit.   Psychiatric:         Mood and Affect: Mood and affect normal.         Behavior: Behavior normal.         Thought Content: Thought content normal.         Judgment: Judgment normal.        Result Review :                 Assessment and Plan    Diagnoses and all orders for this visit:    1. Sore throat (Primary)  -     POCT SARS-CoV-2 Antigen EMILY  -     POCT Influenza A/B  -     POCT rapid strep A  -     clindamycin (Cleocin) 300 MG capsule; Take 1 capsule by mouth 4 (Four) Times a Day for 7 days.  Dispense: 28 capsule; Refill: 0  -     saccharomyces boulardii (Florastor) 250 MG capsule; Take 1 capsule by mouth 2 (Two) Times a Day for 10 days.  Dispense: 20 capsule; Refill: 0  -     predniSONE (DELTASONE) 20 MG tablet; Take 2 tablets by mouth Daily for 5 days.  Dispense: 10 tablet; Refill: 0    2. Other fatigue  -     POCT SARS-CoV-2 Antigen  EMILY  -     POCT Influenza A/B  -     POCT rapid strep A    3. Nasal congestion  -     POCT SARS-CoV-2 Antigen EMILY  -     POCT Influenza A/B  -     POCT rapid strep A    4. Nonintractable headache, unspecified chronicity pattern, unspecified headache type  -     POCT SARS-CoV-2 Antigen EMILY  -     POCT Influenza A/B  -     POCT rapid strep A    5. Fall, initial encounter  -     XR Mandible < 4 View; Future  -     XR Knee 1 or 2 View Bilateral (In Office)  -     CBC Auto Differential  -     Comprehensive Metabolic Panel  -     Magnesium            Follow Up   Return if symptoms worsen or fail to improve.  Patient was given instructions and counseling regarding her condition or for health maintenance advice. Please see specific information pulled into the AVS if appropriate.

## 2022-04-22 NOTE — PROGRESS NOTES
Venipuncture Blood Specimen Collection  Venipuncture performed in left arm by Rosalee Herndon with good hemostasis. Patient tolerated the procedure well without complications.   04/22/22   Rosalee Herndon

## 2022-04-26 NOTE — PROGRESS NOTES
Labs show no significant abnormalities except for elevated WBC's.  Follow-up in 1-2 weeks to recheck CBC.

## 2022-05-02 ENCOUNTER — ANESTHESIA EVENT (OUTPATIENT)
Dept: GASTROENTEROLOGY | Facility: HOSPITAL | Age: 72
End: 2022-05-02

## 2022-05-02 ENCOUNTER — ANESTHESIA (OUTPATIENT)
Dept: GASTROENTEROLOGY | Facility: HOSPITAL | Age: 72
End: 2022-05-02

## 2022-05-02 ENCOUNTER — HOSPITAL ENCOUNTER (OUTPATIENT)
Facility: HOSPITAL | Age: 72
Setting detail: HOSPITAL OUTPATIENT SURGERY
Discharge: HOME | End: 2022-05-02
Attending: INTERNAL MEDICINE | Admitting: INTERNAL MEDICINE

## 2022-05-02 ENCOUNTER — PREP FOR SURGERY (OUTPATIENT)
Dept: OTHER | Facility: HOSPITAL | Age: 72
End: 2022-05-02

## 2022-05-02 VITALS
WEIGHT: 219.14 LBS | OXYGEN SATURATION: 99 % | DIASTOLIC BLOOD PRESSURE: 61 MMHG | BODY MASS INDEX: 37.61 KG/M2 | RESPIRATION RATE: 18 BRPM | HEART RATE: 67 BPM | SYSTOLIC BLOOD PRESSURE: 124 MMHG | TEMPERATURE: 97.2 F

## 2022-05-02 DIAGNOSIS — K57.92 DIVERTICULITIS: ICD-10-CM

## 2022-05-02 PROCEDURE — 88305 TISSUE EXAM BY PATHOLOGIST: CPT | Performed by: INTERNAL MEDICINE

## 2022-05-02 PROCEDURE — 45380 COLONOSCOPY AND BIOPSY: CPT | Performed by: INTERNAL MEDICINE

## 2022-05-02 PROCEDURE — 25010000002 PROPOFOL 10 MG/ML EMULSION: Performed by: NURSE ANESTHETIST, CERTIFIED REGISTERED

## 2022-05-02 PROCEDURE — 45385 COLONOSCOPY W/LESION REMOVAL: CPT | Performed by: INTERNAL MEDICINE

## 2022-05-02 RX ORDER — LIDOCAINE HYDROCHLORIDE 20 MG/ML
INJECTION, SOLUTION EPIDURAL; INFILTRATION; INTRACAUDAL; PERINEURAL AS NEEDED
Status: DISCONTINUED | OUTPATIENT
Start: 2022-05-02 | End: 2022-05-02 | Stop reason: SURG

## 2022-05-02 RX ORDER — CEFDINIR 300 MG/1
300 CAPSULE ORAL 2 TIMES DAILY
COMMUNITY
End: 2022-06-07

## 2022-05-02 RX ORDER — SODIUM CHLORIDE, SODIUM LACTATE, POTASSIUM CHLORIDE, CALCIUM CHLORIDE 600; 310; 30; 20 MG/100ML; MG/100ML; MG/100ML; MG/100ML
30 INJECTION, SOLUTION INTRAVENOUS CONTINUOUS
Status: DISCONTINUED | OUTPATIENT
Start: 2022-05-02 | End: 2022-05-02 | Stop reason: HOSPADM

## 2022-05-02 RX ORDER — PROPOFOL 10 MG/ML
VIAL (ML) INTRAVENOUS AS NEEDED
Status: DISCONTINUED | OUTPATIENT
Start: 2022-05-02 | End: 2022-05-02 | Stop reason: SURG

## 2022-05-02 RX ADMIN — PROPOFOL 50 MG: 10 INJECTION, EMULSION INTRAVENOUS at 09:21

## 2022-05-02 RX ADMIN — SODIUM CHLORIDE, POTASSIUM CHLORIDE, SODIUM LACTATE AND CALCIUM CHLORIDE: 600; 310; 30; 20 INJECTION, SOLUTION INTRAVENOUS at 09:09

## 2022-05-02 RX ADMIN — PROPOFOL 100 MG: 10 INJECTION, EMULSION INTRAVENOUS at 09:10

## 2022-05-02 RX ADMIN — PROPOFOL 200 MCG/KG/MIN: 10 INJECTION, EMULSION INTRAVENOUS at 09:10

## 2022-05-02 RX ADMIN — PROPOFOL 50 MG: 10 INJECTION, EMULSION INTRAVENOUS at 09:34

## 2022-05-02 RX ADMIN — LIDOCAINE HYDROCHLORIDE 100 MG: 20 INJECTION, SOLUTION EPIDURAL; INFILTRATION; INTRACAUDAL; PERINEURAL at 09:10

## 2022-05-02 NOTE — ANESTHESIA PREPROCEDURE EVALUATION
Anesthesia Evaluation     Patient summary reviewed and Nursing notes reviewed   no history of anesthetic complications:  NPO Solid Status: > 8 hours  NPO Liquid Status: > 2 hours           Airway   Mallampati: II  TM distance: >3 FB  Neck ROM: full  No difficulty expected  Dental    (+) upper dentures    Pulmonary - normal exam    breath sounds clear to auscultation  (+) a smoker Former, COPD, home oxygen, sleep apnea,   Cardiovascular - negative cardio ROS and normal exam  Exercise tolerance: good (4-7 METS)    Rhythm: regular  Rate: normal        Neuro/Psych  (+) seizures well controlled,    GI/Hepatic/Renal/Endo - negative ROS     Musculoskeletal (-) negative ROS    Abdominal    Substance History - negative use     OB/GYN negative ob/gyn ROS         Other - negative ROS                       Anesthesia Plan    ASA 4     general       Anesthetic plan, all risks, benefits, and alternatives have been provided, discussed and informed consent has been obtained with: patient and other.        CODE STATUS:

## 2022-05-02 NOTE — H&P
Pre Procedure History & Physical    Chief Complaint:   History of acute diverticulitis in November    Subjective     HPI:   Left lower quadrant pain    Past Medical History:   Past Medical History:   Diagnosis Date   • Insomnia    • Oxygen dependent     wears 02 at hs only   • Pre-diabetes    • Pulmonary fibrosis (HCC)    • Sleep apnea    • Vertigo    • Vertigo        Past Surgical History:  Past Surgical History:   Procedure Laterality Date   • APPENDECTOMY     • CHOLECYSTECTOMY     • HYSTERECTOMY     • TONSILLECTOMY     • TUBAL ABDOMINAL LIGATION         Family History:  Family History   Problem Relation Age of Onset   • Alcohol abuse Father    • Breast cancer Sister    • Breast cancer Maternal Aunt    • Colon cancer Neg Hx        Social History:   reports that she quit smoking about 17 years ago. Her smoking use included cigarettes. She has a 30.00 pack-year smoking history. She has never used smokeless tobacco. She reports that she does not drink alcohol and does not use drugs.    Medications:   Medications Prior to Admission   Medication Sig Dispense Refill Last Dose   • albuterol sulfate  (90 Base) MCG/ACT inhaler inhale 2 puffs EVERY 4 HOURS AS NEEDED increase in directions use spacer AND mask with this inhaler      • amitriptyline (ELAVIL) 100 MG tablet Take 1 tablet by mouth Every Night. 90 tablet 1    • aspirin 81 MG chewable tablet aspirin 81 mg oral tablet,chewable chew 1 tablet (81 mg) by oral route once daily   Active      • fluconazole (Diflucan) 100 MG tablet Take 1 tablet by mouth Daily. 1 tablet 0    • Fluticasone-Umeclidin-Vilant (Trelegy Ellipta) 100-62.5-25 MCG/INH inhaler Inhale 1 puff Daily.      • ipratropium-albuterol (DUO-NEB) 0.5-2.5 mg/3 ml nebulizer ipratropium 0.5 mg-albuterol 3 mg (2.5 mg base)/3 mL nebulization soln      • levETIRAcetam (KEPPRA) 500 MG tablet Take 1 tablet by mouth 2 (Two) Times a Day. 60 tablet 5    • meclizine (ANTIVERT) 25 MG tablet Take 1 tablet by mouth 3  (Three) Times a Day As Needed for Dizziness. 90 tablet 2    • meloxicam (MOBIC) 15 MG tablet Take 1 tablet by mouth Daily. 30 tablet 2    • pregabalin (LYRICA) 100 MG capsule Take 100 mg by mouth 2 (Two) Times a Day.      • saccharomyces boulardii (Florastor) 250 MG capsule Take 1 capsule by mouth 2 (Two) Times a Day for 10 days. 20 capsule 0    • vitamin D3 125 MCG (5000 UT) capsule capsule cholecalciferol (vitamin D3) 125 mcg (5,000 unit) oral capsule take 1 capsule by oral route daily   Active          Allergies:  Cephalexin, Excedrin migraine [asa-apap-caff buffered], Gabapentin, and Naproxen        Objective     Blood pressure 127/77, pulse 86, temperature 97.8 °F (36.6 °C), temperature source Temporal, resp. rate 16, weight 99.4 kg (219 lb 2.2 oz), SpO2 97 %.    Physical Exam   Constitutional: Pt is oriented to person, place, and time and well-developed, well-nourished, and in no distress.   Mouth/Throat: Oropharynx is clear and moist.   Neck: Normal range of motion.   Cardiovascular: Normal rate, regular rhythm and normal heart sounds.    Pulmonary/Chest: Effort normal and breath sounds normal.   Abdominal: Soft. Nontender  Skin: Skin is warm and dry.   Psychiatric: Mood, memory, affect and judgment normal.     Assessment/Plan     Diagnosis:  Acute diverticulitis  more than 4 months ago    Anticipated Surgical Procedure:  Colonoscopy    The risks, benefits, and alternatives of this procedure have been discussed with the patient or the responsible party- the patient understands and agrees to proceed.

## 2022-05-02 NOTE — ANESTHESIA POSTPROCEDURE EVALUATION
Patient: Suzanne Gutierrez    Procedure Summary     Date: 05/02/22 Room / Location: [facility] 4 / [facility]    Anesthesia Start: 0909 Anesthesia Stop: 1005    Procedure: COLONOSCOPY WITH POLYPECTOMIES, BX (N/A ) Diagnosis:       Diverticulitis      (Diverticulitis [K57.92])    Surgeons: Nicko Aranda MD Provider: Pa Varghese MD    Anesthesia Type: general ASA Status: 4          Anesthesia Type: general    Vitals  Vitals Value Taken Time   /61 05/02/22 1022   Temp 36.2 °C (97.2 °F) 05/02/22 1022   Pulse 67 05/02/22 1022   Resp 18 05/02/22 1022   SpO2 99 % 05/02/22 1022           Post Anesthesia Care and Evaluation    Patient location during evaluation: bedside  Patient participation: complete - patient participated  Level of consciousness: awake  Pain management: adequate  Airway patency: patent  Anesthetic complications: No anesthetic complications  PONV Status: none  Cardiovascular status: acceptable and stable  Respiratory status: acceptable  Hydration status: acceptable    Comments: An Anesthesiologist personally participated in the most demanding procedures (including induction and emergence if applicable) in the anesthesia plan, monitored the course of anesthesia administration at frequent intervals and remained physically present and available for immediate diagnosis and treatment of emergencies.

## 2022-05-03 LAB
CYTO UR: NORMAL
LAB AP CASE REPORT: NORMAL
LAB AP CLINICAL INFORMATION: NORMAL
PATH REPORT.FINAL DX SPEC: NORMAL
PATH REPORT.GROSS SPEC: NORMAL

## 2022-05-06 ENCOUNTER — TELEPHONE (OUTPATIENT)
Dept: GASTROENTEROLOGY | Facility: CLINIC | Age: 72
End: 2022-05-06

## 2022-05-06 NOTE — TELEPHONE ENCOUNTER
Pt placed in colon recall. Letter sent ----- Message from ALBAN Gates sent at 5/6/2022  2:09 PM EDT -----  I have reviewed the patient colonoscopy and pathology report. Patient has benign hyperplastic polyps on pathology report. It is recommended the patient be on a 5 year recall. Please place in the recall system. Send letter.

## 2022-05-10 ENCOUNTER — CLINICAL SUPPORT (OUTPATIENT)
Dept: FAMILY MEDICINE CLINIC | Facility: CLINIC | Age: 72
End: 2022-05-10

## 2022-05-10 DIAGNOSIS — Z86.2 HISTORY OF LEUKOCYTOSIS: Primary | ICD-10-CM

## 2022-05-10 LAB
BASOPHILS # BLD AUTO: 0.05 10*3/MM3 (ref 0–0.2)
BASOPHILS NFR BLD AUTO: 0.7 % (ref 0–1.5)
DEPRECATED RDW RBC AUTO: 41.1 FL (ref 37–54)
EOSINOPHIL # BLD AUTO: 0.25 10*3/MM3 (ref 0–0.4)
EOSINOPHIL NFR BLD AUTO: 3.4 % (ref 0.3–6.2)
ERYTHROCYTE [DISTWIDTH] IN BLOOD BY AUTOMATED COUNT: 12.7 % (ref 12.3–15.4)
HCT VFR BLD AUTO: 37.3 % (ref 34–46.6)
HGB BLD-MCNC: 12.4 G/DL (ref 12–15.9)
IMM GRANULOCYTES # BLD AUTO: 0.03 10*3/MM3 (ref 0–0.05)
IMM GRANULOCYTES NFR BLD AUTO: 0.4 % (ref 0–0.5)
LYMPHOCYTES # BLD AUTO: 1.94 10*3/MM3 (ref 0.7–3.1)
LYMPHOCYTES NFR BLD AUTO: 26.7 % (ref 19.6–45.3)
MCH RBC QN AUTO: 30.2 PG (ref 26.6–33)
MCHC RBC AUTO-ENTMCNC: 33.2 G/DL (ref 31.5–35.7)
MCV RBC AUTO: 90.8 FL (ref 79–97)
MONOCYTES # BLD AUTO: 0.54 10*3/MM3 (ref 0.1–0.9)
MONOCYTES NFR BLD AUTO: 7.4 % (ref 5–12)
NEUTROPHILS NFR BLD AUTO: 4.45 10*3/MM3 (ref 1.7–7)
NEUTROPHILS NFR BLD AUTO: 61.4 % (ref 42.7–76)
NRBC BLD AUTO-RTO: 0 /100 WBC (ref 0–0.2)
PLATELET # BLD AUTO: 227 10*3/MM3 (ref 140–450)
PMV BLD AUTO: 10.7 FL (ref 6–12)
RBC # BLD AUTO: 4.11 10*6/MM3 (ref 3.77–5.28)
WBC NRBC COR # BLD: 7.26 10*3/MM3 (ref 3.4–10.8)

## 2022-05-10 PROCEDURE — 85025 COMPLETE CBC W/AUTO DIFF WBC: CPT | Performed by: FAMILY MEDICINE

## 2022-05-10 PROCEDURE — 36415 COLL VENOUS BLD VENIPUNCTURE: CPT | Performed by: FAMILY MEDICINE

## 2022-05-10 NOTE — PROGRESS NOTES
Venipuncture Blood Specimen Collection  Venipuncture performed in left arm by Violeta Amor with good hemostasis. Patient tolerated the procedure well without complications.   05/10/22   Violeta Amor

## 2022-05-17 DIAGNOSIS — G47.00 INSOMNIA, UNSPECIFIED TYPE: ICD-10-CM

## 2022-05-17 RX ORDER — AMITRIPTYLINE HYDROCHLORIDE 100 MG/1
TABLET, FILM COATED ORAL
Qty: 90 TABLET | Refills: 1 | OUTPATIENT
Start: 2022-05-17

## 2022-06-07 ENCOUNTER — OFFICE VISIT (OUTPATIENT)
Dept: FAMILY MEDICINE CLINIC | Facility: CLINIC | Age: 72
End: 2022-06-07

## 2022-06-07 VITALS
HEIGHT: 64 IN | HEART RATE: 94 BPM | TEMPERATURE: 97.1 F | WEIGHT: 225 LBS | OXYGEN SATURATION: 90 % | SYSTOLIC BLOOD PRESSURE: 124 MMHG | DIASTOLIC BLOOD PRESSURE: 70 MMHG | BODY MASS INDEX: 38.41 KG/M2

## 2022-06-07 DIAGNOSIS — B37.9 ANTIBIOTIC-INDUCED YEAST INFECTION: ICD-10-CM

## 2022-06-07 DIAGNOSIS — E66.01 CLASS 2 SEVERE OBESITY WITH SERIOUS COMORBIDITY AND BODY MASS INDEX (BMI) OF 38.0 TO 38.9 IN ADULT, UNSPECIFIED OBESITY TYPE: ICD-10-CM

## 2022-06-07 DIAGNOSIS — N76.0 BACTERIAL VAGINITIS: ICD-10-CM

## 2022-06-07 DIAGNOSIS — B96.89 BACTERIAL VAGINITIS: ICD-10-CM

## 2022-06-07 DIAGNOSIS — Z00.00 MEDICARE ANNUAL WELLNESS VISIT, SUBSEQUENT: Primary | ICD-10-CM

## 2022-06-07 DIAGNOSIS — T36.95XA ANTIBIOTIC-INDUCED YEAST INFECTION: ICD-10-CM

## 2022-06-07 DIAGNOSIS — Z71.85 VACCINE COUNSELING: ICD-10-CM

## 2022-06-07 PROCEDURE — G0439 PPPS, SUBSEQ VISIT: HCPCS | Performed by: NURSE PRACTITIONER

## 2022-06-07 PROCEDURE — 1159F MED LIST DOCD IN RCRD: CPT | Performed by: NURSE PRACTITIONER

## 2022-06-07 PROCEDURE — 1125F AMNT PAIN NOTED PAIN PRSNT: CPT | Performed by: NURSE PRACTITIONER

## 2022-06-07 PROCEDURE — 1170F FXNL STATUS ASSESSED: CPT | Performed by: NURSE PRACTITIONER

## 2022-06-07 RX ORDER — METRONIDAZOLE 500 MG/1
500 TABLET ORAL 2 TIMES DAILY
Qty: 14 TABLET | Refills: 0 | Status: SHIPPED | OUTPATIENT
Start: 2022-06-07 | End: 2022-06-14

## 2022-06-07 RX ORDER — FLUCONAZOLE 150 MG/1
150 TABLET ORAL ONCE
Qty: 1 TABLET | Refills: 0 | Status: SHIPPED | OUTPATIENT
Start: 2022-06-07 | End: 2022-06-07

## 2022-06-07 NOTE — PROGRESS NOTES
The ABCs of the Annual Wellness Visit  Subsequent Medicare Wellness Visit    Pt was on antibiotics twice in the past 2-3  Months and since that time she's been suffering with burning and itching and damp sensation to her vaginal area--and did the diflucan twice and then OTC monistat and  Creams and vagisil and helps only while taking it and then comes right back no DC     Chief Complaint   Patient presents with   • Medicare Wellness-subsequent     Care gaps        Subjective    History of Present Illness:  Suzanne Gutierrez is a 72 y.o. female who presents for a Subsequent Medicare Wellness Visit.    The following portions of the patient's history were reviewed and   updated as appropriate: allergies, current medications, past family history, past medical history, past social history, past surgical history and problem list.    Compared to one year ago, the patient feels her physical   health is the same.    Compared to one year ago, the patient feels her mental   health is the same.    Recent Hospitalizations:  She was not admitted to the hospital during the last year.       Current Medical Providers:  Patient Care Team:  Shelley Poole APRN as PCP - General (Nurse Practitioner)    Outpatient Medications Prior to Visit   Medication Sig Dispense Refill   • albuterol sulfate  (90 Base) MCG/ACT inhaler inhale 2 puffs EVERY 4 HOURS AS NEEDED increase in directions use spacer AND mask with this inhaler     • amitriptyline (ELAVIL) 100 MG tablet Take 1 tablet by mouth Every Night. 90 tablet 1   • aspirin 81 MG chewable tablet aspirin 81 mg oral tablet,chewable chew 1 tablet (81 mg) by oral route once daily   Active     • Fluticasone-Umeclidin-Vilant (Trelegy Ellipta) 100-62.5-25 MCG/INH inhaler Inhale 1 puff Daily.     • ipratropium-albuterol (DUO-NEB) 0.5-2.5 mg/3 ml nebulizer ipratropium 0.5 mg-albuterol 3 mg (2.5 mg base)/3 mL nebulization soln     • levETIRAcetam (KEPPRA) 500 MG tablet Take 1 tablet by  mouth 2 (Two) Times a Day. 60 tablet 5   • meclizine (ANTIVERT) 25 MG tablet Take 1 tablet by mouth 3 (Three) Times a Day As Needed for Dizziness. 90 tablet 2   • pregabalin (LYRICA) 100 MG capsule Take 100 mg by mouth 2 (Two) Times a Day.     • vitamin D3 125 MCG (5000 UT) capsule capsule cholecalciferol (vitamin D3) 125 mcg (5,000 unit) oral capsule take 1 capsule by oral route daily   Active     • cefdinir (OMNICEF) 300 MG capsule Take 300 mg by mouth 2 (Two) Times a Day.       No facility-administered medications prior to visit.       No opioid medication identified on active medication list. I have reviewed chart for other potential  high risk medication/s and harmful drug interactions in the elderly.          Aspirin is on active medication list. Aspirin use is indicated based on review of current medical condition/s. Pros and cons of this therapy have been discussed today. Benefits of this medication outweigh potential harm.  Patient has been encouraged to continue taking this medication.  .      Patient Active Problem List   Diagnosis   • Dyspnea   • Hoarseness   • Pain in both knees   • Fibrosis of lung (HCC)   • Lung disease   • Otalgia   • Other acute sinusitis   • Recurrent otitis media   • Sleep apnea   • Vertigo   • COPD mixed type (HCC)   • Partial idiopathic epilepsy with seizures of localized onset, not intractable, without status epilepticus (HCC)   • Primary localized osteoarthrosis of right lower leg   • Diverticulitis   • Vitamin D deficiency     Advance Care Planning  Advance Directive is not on file.  ACP discussion was held with the patient during this visit. Patient does not have an advance directive, declines further assistance.    Review of Systems   Constitutional: Negative for fatigue.   HENT: Negative.    Eyes: Negative.    Respiratory: Positive for shortness of breath.         Chronic with the idiopathic lung disease     Cardiovascular: Negative for chest pain and leg swelling.  "  Gastrointestinal: Negative for abdominal pain and blood in stool.   Endocrine: Negative for polydipsia, polyphagia and polyuria.   Genitourinary: Negative for dysuria and vaginal discharge.        Itching and burning and dampness since the 2 rounds of antibiotics    Musculoskeletal: Positive for arthralgias.        Right knee chronically    Skin: Negative for rash and wound.   Allergic/Immunologic: Positive for environmental allergies.   Neurological: Positive for light-headedness. Negative for dizziness, tremors, seizures, syncope and numbness.        With first standing up and resolves in few seconds    Hematological: Does not bruise/bleed easily.   Psychiatric/Behavioral: Negative for dysphoric mood, self-injury and suicidal ideas. The patient is not nervous/anxious.         Objective    Vitals:    06/07/22 1040   BP: 124/70   BP Location: Right arm   Patient Position: Sitting   Cuff Size: Adult   Pulse: 94   Temp: 97.1 °F (36.2 °C)   TempSrc: Temporal   SpO2: 90%   Weight: 102 kg (225 lb)   Height: 162.6 cm (64\")   PainSc:   6   PainLoc: Knee  Comment: right     Estimated body mass index is 38.62 kg/m² as calculated from the following:    Height as of this encounter: 162.6 cm (64\").    Weight as of this encounter: 102 kg (225 lb).    Class 2 Severe Obesity (BMI >=35 and <=39.9). Obesity-related health conditions include the following: osteoarthritis. Obesity is unchanged. BMI is is above average; BMI management plan is completed. We discussed portion control and increasing exercise.      Does the patient have evidence of cognitive impairment? No    Physical Exam  Vitals and nursing note reviewed.   Constitutional:       Appearance: Normal appearance.   HENT:      Head: Normocephalic.      Right Ear: External ear normal.      Left Ear: External ear normal.      Nose: Nose normal.      Mouth/Throat:      Comments: Wearing mask  Eyes:      Pupils: Pupils are equal, round, and reactive to light.   Cardiovascular: "      Rate and Rhythm: Normal rate and regular rhythm.      Pulses:           Carotid pulses are 2+ on the right side and 2+ on the left side.     Heart sounds: Normal heart sounds.   Pulmonary:      Effort: Pulmonary effort is normal.      Breath sounds: Normal breath sounds.   Abdominal:      General: Bowel sounds are normal.      Palpations: Abdomen is soft.      Tenderness: There is no abdominal tenderness.   Musculoskeletal:      Cervical back: Normal range of motion and neck supple.      Right knee: Crepitus present. Decreased range of motion. Tenderness present.      Right lower leg: No edema.      Left lower leg: No edema.   Skin:     General: Skin is warm and dry.   Neurological:      Mental Status: She is alert and oriented to person, place, and time.   Psychiatric:         Mood and Affect: Mood normal.         Behavior: Behavior normal.         Thought Content: Thought content normal.         Judgment: Judgment normal.       Lab Results   Component Value Date    TRIG 164 (H) 2022    HDL 55 2022     (H) 2022    VLDL 30 2022    HGBA1C 5.90 (H) 2022            HEALTH RISK ASSESSMENT    Smoking Status:  Social History     Tobacco Use   Smoking Status Former Smoker   • Packs/day: 1.00   • Years: 30.00   • Pack years: 30.00   • Types: Cigarettes   • Quit date: 2004   • Years since quittin.9   Smokeless Tobacco Never Used     Alcohol Consumption:  Social History     Substance and Sexual Activity   Alcohol Use Never     Fall Risk Screen:    STEADI Fall Risk Assessment was completed, and patient is at LOW risk for falls.Assessment completed on:3/15/2022    Depression Screening:  PHQ-2/PHQ-9 Depression Screening 2022   Retired PHQ-9 Total Score -   Retired Total Score -   Little Interest or Pleasure in Doing Things 0-->not at all   Feeling Down, Depressed or Hopeless 0-->not at all   PHQ-9: Brief Depression Severity Measure Score 0       Health Habits and Functional  and Cognitive Screening:  Functional & Cognitive Status 6/7/2022   Do you have difficulty preparing food and eating? No   Do you have difficulty bathing yourself, getting dressed or grooming yourself? No   Do you have difficulty using the toilet? No   Do you have difficulty moving around from place to place? No   Do you have trouble with steps or getting out of a bed or a chair? No   Current Diet Well Balanced Diet   Dental Exam Up to date   Eye Exam Up to date   Exercise (times per week) 8 times per week   Current Exercises Include House Cleaning;Yard Work;Walking;Dancing;Running/Jogging   Do you need help using the phone?  No   Are you deaf or do you have serious difficulty hearing?  No   Do you need help with transportation? Yes   Do you need help shopping? No   Do you need help preparing meals?  No   Do you need help with housework?  No   Do you need help with laundry? No   Do you need help taking your medications? No   Do you need help managing money? No   Do you ever drive or ride in a car without wearing a seat belt? No   Have you felt unusual stress, anger or loneliness in the last month? No   Who do you live with? Other   If you need help, do you have trouble finding someone available to you? No   Have you been bothered in the last four weeks by sexual problems? No   Do you have difficulty concentrating, remembering or making decisions? No       Age-appropriate Screening Schedule:  Refer to the list below for future screening recommendations based on patient's age, sex and/or medical conditions. Orders for these recommended tests are listed in the plan section. The patient has been provided with a written plan.    Health Maintenance   Topic Date Due   • ZOSTER VACCINE (2 of 3) 08/28/2019   • INFLUENZA VACCINE  08/01/2022   • DXA SCAN  10/23/2022   • MAMMOGRAM  10/28/2023   • TDAP/TD VACCINES (2 - Td or Tdap) 04/06/2032              Assessment & Plan   CMS Preventative Services Quick Reference  Risk Factors  Identified During Encounter  Immunizations Discussed/Encouraged (specific Immunizations; Shingrix  Obesity/Overweight   The above risks/problems have been discussed with the patient.  Follow up actions/plans if indicated are seen below in the Assessment/Plan Section.  Pertinent information has been shared with the patient in the After Visit Summary.    Diagnoses and all orders for this visit:    1. Medicare annual wellness visit, subsequent (Primary)    2. Bacterial vaginitis  -     metroNIDAZOLE (Flagyl) 500 MG tablet; Take 1 tablet by mouth 2 (Two) Times a Day for 7 days.  Dispense: 14 tablet; Refill: 0    3. Antibiotic-induced yeast infection  -     fluconazole (Diflucan) 150 MG tablet; Take 1 tablet by mouth 1 (One) Time for 1 dose.  Dispense: 1 tablet; Refill: 0    4. Vaccine counseling  -     Zoster Vac Recomb Adjuvanted 50 MCG/0.5ML reconstituted suspension; Inject 0.5 mL into the appropriate muscle as directed by prescriber 1 (One) Time for 1 dose. Then repeat at 2-6 months  Dispense: 1 each; Refill: 1    5. Class 2 severe obesity with serious comorbidity and body mass index (BMI) of 38.0 to 38.9 in adult, unspecified obesity type (HCC)  Comments:  counseled diet and exercise         Follow Up:   No follow-ups on file.     An After Visit Summary and PPPS were made available to the patient.

## 2022-06-09 ENCOUNTER — TELEPHONE (OUTPATIENT)
Dept: ORTHOPEDIC SURGERY | Facility: CLINIC | Age: 72
End: 2022-06-09

## 2022-06-09 NOTE — TELEPHONE ENCOUNTER
Caller: PATIENT     Relationship to patient: SELF    Best call back number: [phone]    Chief complaint: RIGHT KNEE PAIN    Type of visit: GEL INJECTION     Requested date: ASAP     Additional notes: PT. STATES SHE LAST SAW GUSTAVO BAXTER AND GOT A GEL INJECTION IN RIGHT KNEE IN November 2021.   SHE IS READY TO SCHEDULE NEXT GEL INJECTION PLEASE.

## 2022-06-22 ENCOUNTER — OFFICE VISIT (OUTPATIENT)
Dept: ORTHOPEDIC SURGERY | Facility: CLINIC | Age: 72
End: 2022-06-22

## 2022-06-22 VITALS — OXYGEN SATURATION: 97 % | BODY MASS INDEX: 39.2 KG/M2 | HEIGHT: 64 IN | HEART RATE: 74 BPM | WEIGHT: 229.6 LBS

## 2022-06-22 DIAGNOSIS — M17.11 PRIMARY OSTEOARTHRITIS OF RIGHT KNEE: Primary | ICD-10-CM

## 2022-06-22 PROCEDURE — 20610 DRAIN/INJ JOINT/BURSA W/O US: CPT | Performed by: ORTHOPAEDIC SURGERY

## 2022-06-22 PROCEDURE — 99213 OFFICE O/P EST LOW 20 MIN: CPT | Performed by: ORTHOPAEDIC SURGERY

## 2022-06-22 NOTE — PROGRESS NOTES
"Chief Complaint  Follow-up of the Right Knee     Subjective      Suzanne Gutierrez presents to [facility] for a follow-up of right knee. She has right knee osteoarthritis, she denies any new injury or trauma to the right knee. Majority of the pain in the right knee is in the joint line but she notes pain in the hamstrings and the posterior aspect of the knee.    Allergies   Allergen Reactions   • Cephalexin Itching   • Excedrin Migraine [Asa-Apap-Caff Buffered] GI Intolerance   • Gabapentin Unknown - High Severity   • Naproxen Unknown - High Severity        Social History     Socioeconomic History   • Marital status:    Tobacco Use   • Smoking status: Former Smoker     Packs/day: 1.00     Years: 30.00     Pack years: 30.00     Types: Cigarettes     Quit date: 2004     Years since quittin.9   • Smokeless tobacco: Never Used   Vaping Use   • Vaping Use: Never used   Substance and Sexual Activity   • Alcohol use: Never   • Drug use: Never   • Sexual activity: Defer        Review of Systems     Objective   Vital Signs:   Pulse 74   Ht 162.6 cm (64\")   Wt 104 kg (229 lb 9.6 oz)   SpO2 97%   BMI 39.41 kg/m²       Physical Exam  Constitutional:       Appearance: Normal appearance. Patient is well-developed and normal weight.   HENT:      Head: Normocephalic.      Right Ear: Hearing and external ear normal.      Left Ear: Hearing and external ear normal.      Nose: Nose normal.   Eyes:      Conjunctiva/sclera: Conjunctivae normal.   Cardiovascular:      Rate and Rhythm: Normal rate.   Pulmonary:      Effort: Pulmonary effort is normal.      Breath sounds: No wheezing or rales.   Abdominal:      Palpations: Abdomen is soft.      Tenderness: There is no abdominal tenderness.   Musculoskeletal:      Cervical back: Normal range of motion.   Skin:     Findings: No rash.   Neurological:      Mental Status: Patient  is alert and oriented to person, place, and time.   Psychiatric:    "      Mood and Affect: Mood and affect normal.         Judgment: Judgment normal.       Ortho Exam      RIGHT KNEE: Full extension. Flexion to 120 degrees. Good strength to hamstrings, quadriceps, dorsiflexors and plantar flexors. Stable to varus/valgus stress. Stable anterior and posterior drawer. Tender joint lines. Mild hamstring tenderness. No swelling, skin discoloration or atrophy. Calf soft.       Large Joint Arthrocentesis: R knee  Date/Time: 6/22/2022 10:42 AM  Consent given by: patient  Site marked: site marked  Timeout: Immediately prior to procedure a time out was called to verify the correct patient, procedure, equipment, support staff and site/side marked as required   Supporting Documentation  Indications: pain   Procedure Details  Location: knee - R knee  Needle size: 22 G  Medications administered: 48 mg hylan 48 MG/6ML  Patient tolerance: patient tolerated the procedure well with no immediate complications              Imaging Results (Most Recent)     None           Result Review :       No results found.           Assessment and Plan     Diagnoses and all orders for this visit:    1. Primary osteoarthritis of right knee (Primary)        Right knee synvisc one injection given, patient tolerated this well.     Call or return if worsening symptoms.    Follow Up     PRN.       Patient was given instructions and counseling regarding her condition or for health maintenance advice. Please see specific information pulled into the AVS if appropriate.     Scribed for Nicola Langford MD by Isabell Perdomo.  06/22/22   10:36 EDT      I have personally performed the services described in this document as scribed by the above individual and it is both accurate and complete. Nicola Langford MD 06/22/22

## 2022-06-28 ENCOUNTER — TELEPHONE (OUTPATIENT)
Dept: FAMILY MEDICINE CLINIC | Facility: CLINIC | Age: 72
End: 2022-06-28

## 2022-06-28 DIAGNOSIS — Z78.0 POSTMENOPAUSAL: Primary | ICD-10-CM

## 2022-06-28 NOTE — TELEPHONE ENCOUNTER
Caller: Suzanne Gutierrez    Relationship: Self    Best call back number: [phone]    What specialty or service is being requested: OB/GYN    What is the office location: ANYONE LOCALLY

## 2022-07-22 DIAGNOSIS — R42 VERTIGO: ICD-10-CM

## 2022-07-22 RX ORDER — MECLIZINE HYDROCHLORIDE 25 MG/1
25 TABLET ORAL 3 TIMES DAILY PRN
Qty: 90 TABLET | Refills: 0 | Status: SHIPPED | OUTPATIENT
Start: 2022-07-22 | End: 2023-01-12 | Stop reason: SDUPTHER

## 2022-08-29 ENCOUNTER — OFFICE VISIT (OUTPATIENT)
Dept: OBSTETRICS AND GYNECOLOGY | Facility: CLINIC | Age: 72
End: 2022-08-29

## 2022-08-29 VITALS
WEIGHT: 228 LBS | DIASTOLIC BLOOD PRESSURE: 75 MMHG | SYSTOLIC BLOOD PRESSURE: 129 MMHG | BODY MASS INDEX: 38.93 KG/M2 | HEART RATE: 99 BPM | HEIGHT: 64 IN

## 2022-08-29 DIAGNOSIS — N90.89 VULVAR LESION: Primary | ICD-10-CM

## 2022-08-29 DIAGNOSIS — N95.2 VAGINAL ATROPHY: ICD-10-CM

## 2022-08-29 PROCEDURE — 56605 BIOPSY OF VULVA/PERINEUM: CPT | Performed by: OBSTETRICS & GYNECOLOGY

## 2022-08-29 PROCEDURE — 88305 TISSUE EXAM BY PATHOLOGIST: CPT | Performed by: OBSTETRICS & GYNECOLOGY

## 2022-08-29 PROCEDURE — 88312 SPECIAL STAINS GROUP 1: CPT | Performed by: OBSTETRICS & GYNECOLOGY

## 2022-08-29 PROCEDURE — 99203 OFFICE O/P NEW LOW 30 MIN: CPT | Performed by: OBSTETRICS & GYNECOLOGY

## 2022-08-29 RX ORDER — PIRFENIDONE 267 MG/1
CAPSULE ORAL
COMMUNITY
Start: 2022-08-03

## 2022-08-31 LAB
CYTO UR: NORMAL
LAB AP CASE REPORT: NORMAL
LAB AP CLINICAL INFORMATION: NORMAL
LAB AP SPECIAL STAINS: NORMAL
PATH REPORT.FINAL DX SPEC: NORMAL
PATH REPORT.GROSS SPEC: NORMAL

## 2022-09-02 ENCOUNTER — PATIENT ROUNDING (BHMG ONLY) (OUTPATIENT)
Dept: OBSTETRICS AND GYNECOLOGY | Facility: CLINIC | Age: 72
End: 2022-09-02

## 2022-09-06 PROBLEM — N90.89 VULVAR LESION: Status: ACTIVE | Noted: 2022-09-06

## 2022-09-06 PROBLEM — N95.2 VAGINAL ATROPHY: Status: ACTIVE | Noted: 2022-09-06

## 2022-09-06 NOTE — ASSESSMENT & PLAN NOTE
The patient has 4 significant vulvar and labial lesions present.  These could be a myriad of issues including lichen sclerosis or planus.  This could also be JAYASHREE.  I recommended she avoid scratching if at all possible.  I have recommended initially topical hydrocortisone OTC for the itching until a formal diagnosis can be made.  I have also recommended she have a vulvar biopsy today.

## 2022-09-06 NOTE — ASSESSMENT & PLAN NOTE
After treatment of the vulvar lesions the patient has severe atrophy which could also be contributing to her itching.  She will likely need local estrogen replacement.

## 2022-09-06 NOTE — PROGRESS NOTES
"Vulvar biopsy      CC:  Vulvar Biopsy     Consent signed: Yes    Biopsy procedure was discussed.  She understand the benefits, risks, and alternatives.  The potential risks are not limited to pain, bleeding, and/or infection.  All her questions have been answered to her satisfaction and she desires the procedure.    HPI: Who presents with vaginal itching and a vulvar lesion.    /75   Pulse 99   Ht 162.6 cm (64\")   Wt 103 kg (228 lb)   Breastfeeding No   BMI 39.14 kg/m²     Physical Exam  Vitals and nursing note reviewed. Exam conducted with a chaperone present.   Constitutional:       General: She is not in acute distress.     Appearance: Normal appearance. She is obese. She is not ill-appearing.   Abdominal:      Hernia: There is no hernia in the left inguinal area or right inguinal area.   Genitourinary:     Exam position: Lithotomy position.      Pubic Area: No rash.       Labia:         Right: Rash, tenderness and lesion present. No injury.         Left: Rash, tenderness and lesion present. No injury.       Urethra: No urethral pain or urethral lesion.       Neurological:      Mental Status: She is alert and oriented to person, place, and time.   Psychiatric:         Mood and Affect: Mood normal.         Behavior: Behavior normal.         Thought Content: Thought content normal.         Judgment: Judgment normal.           Biopsy:   After consent the plantar biopsy site was prepped with Betadine.   3 mL of 1% lidocaine  without epinephrine placed   Punch biopsy 4mm  Patient tolerated well   Specimen/s sent to path      ASSESSMENT AND PLAN:     Diagnoses and all orders for this visit:    1. Vulvar lesion (Primary)  Assessment & Plan:  The patient has 4 significant vulvar and labial lesions present.  These could be a myriad of issues including lichen sclerosis or planus.  This could also be JAYASHREE.  I recommended she avoid scratching if at all possible.  I have recommended initially topical hydrocortisone " OTC for the itching until a formal diagnosis can be made.  I have also recommended she have a vulvar biopsy today.    Orders:  -     Tissue Pathology Exam    2. Vaginal atrophy  Assessment & Plan:  After treatment of the vulvar lesions the patient has severe atrophy which could also be contributing to her itching.  She will likely need local estrogen replacement.            Counseling:   Post biopsy precautions: bleeding, infection, pain.  Care of area reviewed, keep clean and dry.  Avoid intercourse or tub bath/pool until healed completely.  OTC meds prn discomfort.  We will call with results.  If she has not heard from our office in next 7-10days she agrees to call for results.  I have encourage online retrieval of results.    Follow Up:  Return in about 1 week (around 9/5/2022) for discuss biopsy result.      Jean Ramirez MD  08/29/2022

## 2022-09-14 RX ORDER — LEVETIRACETAM 500 MG/1
TABLET ORAL
Qty: 60 TABLET | Refills: 1 | Status: SHIPPED | OUTPATIENT
Start: 2022-09-14 | End: 2022-11-21 | Stop reason: SDUPTHER

## 2022-09-15 DIAGNOSIS — R60.9 EDEMA, UNSPECIFIED TYPE: ICD-10-CM

## 2022-09-15 RX ORDER — HYDROCHLOROTHIAZIDE 12.5 MG/1
TABLET ORAL
Qty: 90 TABLET | Refills: 3 | OUTPATIENT
Start: 2022-09-15

## 2022-09-15 NOTE — TELEPHONE ENCOUNTER
At last visit with Dr Scott in April--her BP was only 96/60 and he stopped the HCTZ--so I would need her to F/U please and see if BP has stabilized out and then we can see if the HCTZ should be resumed--sorry

## 2022-09-26 ENCOUNTER — TELEPHONE (OUTPATIENT)
Dept: ORTHOPEDIC SURGERY | Facility: CLINIC | Age: 72
End: 2022-09-26

## 2022-09-26 NOTE — TELEPHONE ENCOUNTER
Caller: LOYDAJOHN    Relationship: Emergency Contact    Best call back number: [phone] (1PM-5:30PM) [phone]    Who is your current provider: DR LEMON    Who would you like your new provider to be: DR CASTELLANOS    What are your reasons for transferring care: PT IS NOT HAPPY WITH THE PROGRESS OF HER CARE.    Additional notes: PT NEEDS TO BE SEEN FOR BILATERAL KNEE PAIN.

## 2022-09-29 ENCOUNTER — OFFICE VISIT (OUTPATIENT)
Dept: ORTHOPEDIC SURGERY | Facility: CLINIC | Age: 72
End: 2022-09-29

## 2022-09-29 VITALS — WEIGHT: 228 LBS | HEIGHT: 64 IN | OXYGEN SATURATION: 99 % | BODY MASS INDEX: 38.93 KG/M2 | HEART RATE: 69 BPM

## 2022-09-29 DIAGNOSIS — M25.562 PAIN IN BOTH KNEES, UNSPECIFIED CHRONICITY: ICD-10-CM

## 2022-09-29 DIAGNOSIS — M17.11 PRIMARY LOCALIZED OSTEOARTHROSIS OF RIGHT LOWER LEG: Primary | ICD-10-CM

## 2022-09-29 DIAGNOSIS — M25.561 PAIN IN BOTH KNEES, UNSPECIFIED CHRONICITY: ICD-10-CM

## 2022-09-29 PROCEDURE — 20610 DRAIN/INJ JOINT/BURSA W/O US: CPT | Performed by: ORTHOPAEDIC SURGERY

## 2022-09-29 PROCEDURE — 99213 OFFICE O/P EST LOW 20 MIN: CPT | Performed by: ORTHOPAEDIC SURGERY

## 2022-09-29 RX ORDER — TRIAMCINOLONE ACETONIDE 40 MG/ML
40 INJECTION, SUSPENSION INTRA-ARTICULAR; INTRAMUSCULAR
Status: COMPLETED | OUTPATIENT
Start: 2022-09-29 | End: 2022-09-29

## 2022-09-29 RX ORDER — DICLOFENAC SODIUM 75 MG/1
75 TABLET, DELAYED RELEASE ORAL 2 TIMES DAILY
Qty: 60 TABLET | Refills: 1 | Status: SHIPPED | OUTPATIENT
Start: 2022-09-29 | End: 2023-01-12

## 2022-09-29 RX ORDER — LIDOCAINE HYDROCHLORIDE 10 MG/ML
5 INJECTION, SOLUTION INFILTRATION; PERINEURAL
Status: COMPLETED | OUTPATIENT
Start: 2022-09-29 | End: 2022-09-29

## 2022-09-29 RX ADMIN — TRIAMCINOLONE ACETONIDE 40 MG: 40 INJECTION, SUSPENSION INTRA-ARTICULAR; INTRAMUSCULAR at 11:05

## 2022-09-29 RX ADMIN — LIDOCAINE HYDROCHLORIDE 5 ML: 10 INJECTION, SOLUTION INFILTRATION; PERINEURAL at 11:05

## 2022-09-29 NOTE — PROGRESS NOTES
"Chief Complaint  Initial Evaluation of the Left Knee and Pain and Follow-up of the Right Knee     Subjective      Suzanne Gutierrez presents to [facility] for an evaluation of bilateral knees. Her left knee is worse than the right. She states having increased pain lately. She has a history of a right knee scope, meniscal repair done 2 years ago. Left knee she had cortisone injections in the past that gave her relief. This was 2-3 years ago. Her left knee pain began after stepping out of bed.     Allergies   Allergen Reactions   • Cephalexin Itching   • Excedrin Migraine [Asa-Apap-Caff Buffered] GI Intolerance   • Gabapentin Unknown - High Severity   • Naproxen Unknown - High Severity        Social History     Socioeconomic History   • Marital status:    Tobacco Use   • Smoking status: Former Smoker     Packs/day: 1.00     Years: 30.00     Pack years: 30.00     Types: Cigarettes     Quit date: 2004     Years since quittin.2   • Smokeless tobacco: Never Used   Vaping Use   • Vaping Use: Never used   Substance and Sexual Activity   • Alcohol use: Never   • Drug use: Never   • Sexual activity: Not Currently        Review of Systems     Objective   Vital Signs:   Pulse 69   Ht 162.6 cm (64\")   Wt 103 kg (228 lb)   SpO2 99%   BMI 39.14 kg/m²       Physical Exam  Constitutional:       Appearance: Normal appearance. Patient is well-developed and normal weight.   HENT:      Head: Normocephalic.      Right Ear: Hearing and external ear normal.      Left Ear: Hearing and external ear normal.      Nose: Nose normal.   Eyes:      Conjunctiva/sclera: Conjunctivae normal.   Cardiovascular:      Rate and Rhythm: Normal rate.   Pulmonary:      Effort: Pulmonary effort is normal.      Breath sounds: No wheezing or rales.   Abdominal:      Palpations: Abdomen is soft.      Tenderness: There is no abdominal tenderness.   Musculoskeletal:      Cervical back: Normal range of motion. "   Skin:     Findings: No rash.   Neurological:      Mental Status: Patient is alert and oriented to person, place, and time.   Psychiatric:         Mood and Affect: Mood and affect normal.         Judgment: Judgment normal.       Ortho Exam      LEFT KNEE: Tender joint lines. No swelling, skin discoloration or atrophy. Full extension with pain. Flexion to 120 degrees. Good strength to hamstrings, quadriceps, dorsiflexors and plantar flexors. Stable to varus/valgus stress. Stable anterior and posterior drawer. Negative lachman. Dorsal Pedal Pulse 2+, posterior tibialis pulse 2+.     RIGHT KNEE: Antalgic gait. Pain with ambulation. Ambulation with a cane. Tender medial joint line. Sensation grossly intact. Neurovascular intact.  Good strength to hamstrings, quadriceps, dorsiflexors and plantar flexors. Stable to varus/valgus stress. Stable anterior and posterior drawer. Negative Lachman.       Large Joint Arthrocentesis: L knee  Date/Time: 9/29/2022 11:05 AM  Consent given by: patient  Site marked: site marked  Timeout: Immediately prior to procedure a time out was called to verify the correct patient, procedure, equipment, support staff and site/side marked as required   Supporting Documentation  Indications: pain   Procedure Details  Location: knee - L knee  Needle gauge: 21G.  Medications administered: 40 mg triamcinolone acetonide 40 MG/ML; 5 mL lidocaine 1 %  Patient tolerance: patient tolerated the procedure well with no immediate complications            Imaging Results (Most Recent)     Procedure Component Value Units Date/Time    XR Knee 3 View Right [[id number]] Resulted: 09/29/22 1032     Updated: 09/29/22 1037    XR Knee 3 View Left [[id number]] Resulted: 09/29/22 1032     Updated: 09/29/22 1037           Result Review :     X-Ray Report:  Bilateral knee(s) X-Ray  Indication: Evaluation of bilateral knee pain   AP, Lateral and Standing view(s)  Findings: Left knee demonstrates well maintained joint space, no  acute fractures. Right knee osteoarthritis.   Prior studies available for comparison: no             Assessment and Plan     Diagnoses and all orders for this visit:    1. Primary localized osteoarthrosis of right lower leg (Primary)    2. Pain in both knees, unspecified chronicity  -     XR Knee 3 View Right  -     XR Knee 3 View Left        Plan for left knee cortisone injection.   Left knee cortisone injection administered, she tolerated this well.   Anti-inflammatory prescribed.   Prescription for walker written to help with ambulation.     Call or return if worsening symptoms.    Follow Up     PRN.       Patient was given instructions and counseling regarding her condition or for health maintenance advice. Please see specific information pulled into the AVS if appropriate.     Scribed for Shiv Mckeon MD by Isabell Perdomo.  09/29/22   10:56 EDT    I have personally performed the services described in this document as scribed by the above individual and it is both accurate and complete. Shiv Mckeon MD 09/29/22

## 2022-10-17 DIAGNOSIS — G47.00 INSOMNIA, UNSPECIFIED TYPE: ICD-10-CM

## 2022-10-17 RX ORDER — AMITRIPTYLINE HYDROCHLORIDE 100 MG/1
TABLET, FILM COATED ORAL
Qty: 90 TABLET | Refills: 0 | Status: SHIPPED | OUTPATIENT
Start: 2022-10-17 | End: 2023-01-12 | Stop reason: SDUPTHER

## 2022-10-20 ENCOUNTER — OFFICE VISIT (OUTPATIENT)
Dept: ORTHOPEDIC SURGERY | Facility: CLINIC | Age: 72
End: 2022-10-20

## 2022-10-20 VITALS — WEIGHT: 229.6 LBS | OXYGEN SATURATION: 90 % | HEIGHT: 64 IN | BODY MASS INDEX: 39.2 KG/M2 | HEART RATE: 59 BPM

## 2022-10-20 DIAGNOSIS — M25.562 PAIN IN BOTH KNEES, UNSPECIFIED CHRONICITY: Primary | ICD-10-CM

## 2022-10-20 DIAGNOSIS — M25.562 ACUTE PAIN OF LEFT KNEE: Primary | ICD-10-CM

## 2022-10-20 DIAGNOSIS — M25.561 PAIN IN BOTH KNEES, UNSPECIFIED CHRONICITY: Primary | ICD-10-CM

## 2022-10-20 DIAGNOSIS — M17.11 PRIMARY OSTEOARTHRITIS OF RIGHT KNEE: ICD-10-CM

## 2022-10-20 PROCEDURE — 99214 OFFICE O/P EST MOD 30 MIN: CPT | Performed by: PHYSICIAN ASSISTANT

## 2022-10-20 PROCEDURE — 20610 DRAIN/INJ JOINT/BURSA W/O US: CPT | Performed by: PHYSICIAN ASSISTANT

## 2022-10-20 RX ORDER — LIDOCAINE HYDROCHLORIDE 10 MG/ML
5 INJECTION, SOLUTION INFILTRATION; PERINEURAL
Status: COMPLETED | OUTPATIENT
Start: 2022-10-20 | End: 2022-10-20

## 2022-10-20 RX ORDER — TRIAMCINOLONE ACETONIDE 40 MG/ML
40 INJECTION, SUSPENSION INTRA-ARTICULAR; INTRAMUSCULAR
Status: COMPLETED | OUTPATIENT
Start: 2022-10-20 | End: 2022-10-20

## 2022-10-20 RX ORDER — PREGABALIN 200 MG/1
CAPSULE ORAL
COMMUNITY
Start: 2022-10-05 | End: 2022-11-21

## 2022-10-20 RX ORDER — HYDROCODONE BITARTRATE AND ACETAMINOPHEN 5; 325 MG/1; MG/1
1 TABLET ORAL EVERY 6 HOURS PRN
Qty: 28 TABLET | Refills: 0 | Status: SHIPPED | OUTPATIENT
Start: 2022-10-20 | End: 2023-01-12

## 2022-10-20 RX ADMIN — TRIAMCINOLONE ACETONIDE 40 MG: 40 INJECTION, SUSPENSION INTRA-ARTICULAR; INTRAMUSCULAR at 11:15

## 2022-10-20 RX ADMIN — LIDOCAINE HYDROCHLORIDE 5 ML: 10 INJECTION, SOLUTION INFILTRATION; PERINEURAL at 11:15

## 2022-10-20 NOTE — PATIENT INSTRUCTIONS
Right knee steroid injection administered today in office. Advised on duration between injections. Patient is eligible for repeat injection 3 months from today. Can consider visco injection after 6 weeks, if needed.     Given L knee pain and instability with no improvement with home exercises, NSAIDs, or steroid injection, will proceed with L knee MRI.     Follow up with MRI L knee results. Call with changes or concerns.

## 2022-10-20 NOTE — PROGRESS NOTES
"Chief Complaint  Pain and Follow-up of the Left Knee and Pain and Follow-up of the Right Knee    Subjective      Suzanne Gutierrez presents to [facility] for follow-up of left knee pain and right knee osteoarthritis.  She was most recently seen in office on 9/29/2022 by Dr. Mckeon.  X-rays of the left knee demonstrated well-maintained joint space without acute fractures.  X-ray of the right knee showed right knee osteoarthritis.  She received a left knee steroid injection, which she reports has provided no relief.  She is complaining of significant lateral knee pain and feeling of instability, feels as if her left leg is going to give out from underneath her.  She denies any falls or trauma.  Does report this pain has been present for at least 2 months and started abruptly after she stood up from getting out of bed.  She reports her the right knee is still very painful and is requesting a right knee steroid injection today.    Objective   Allergies   Allergen Reactions   • Cephalexin Itching   • Excedrin Migraine [Asa-Apap-Caff Buffered] GI Intolerance   • Gabapentin Unknown - High Severity   • Naproxen Unknown - High Severity       Vital Signs:   Pulse 59   Ht 162.6 cm (64\")   Wt 104 kg (229 lb 9.6 oz)   SpO2 90%   BMI 39.41 kg/m²       Physical Exam    Constitutional: Awake, alert. Well nourished appearance.    Integumentary: Warm, dry, intact. No obvious rashes.    HENT: Atraumatic, normocephalic.   Respiratory: Non labored respirations .   Cardiovascular: Intact peripheral pulses.    Psychiatric: Normal mood and affect. A&O X3    Ortho Exam  Left knee: Tenderness to palpation of lateral joint line.  Patella is well tracking.  Knee is stable to varus and valgus stress.  Full knee extension and knee flexion to 120 degrees.  Full plantarflexion and dorsiflexion of the ankle.  Sensation intact to light touch.  Distal neurovascular intact.  Negative Lachman's.    Right knee: Tenderness " to palpation of medial and lateral joint lines.  Patella is well tracking.  Knee is stable to varus and valgus stress.  Full knee extension and knee flexion to 125 degrees.  Full plantarflexion and dorsiflexion of the ankle.  Sensation intact to light touch.  Distal neurovascular intact.    Ambulatory with assistance of cane with antalgic gait noted.    Imaging Results (Most Recent)     None           Large Joint Arthrocentesis: R knee  Date/Time: 10/20/2022 11:15 AM  Consent given by: patient  Site marked: site marked  Timeout: Immediately prior to procedure a time out was called to verify the correct patient, procedure, equipment, support staff and site/side marked as required   Supporting Documentation  Indications: pain   Procedure Details  Location: knee - R knee  Preparation: Patient was prepped and draped in the usual sterile fashion  Needle gauge: 21 G.  Approach: lateral  Medications administered: 5 mL lidocaine 1 %; 40 mg triamcinolone acetonide 40 MG/ML  Patient tolerance: patient tolerated the procedure well with no immediate complications              Assessment and Plan   Problem List Items Addressed This Visit    None  Visit Diagnoses     Acute pain of left knee    -  Primary    Relevant Orders    MRI Knee Left Without Contrast    Primary osteoarthritis of right knee            Follow Up   Return for Recheck MRI results.    Patient Instructions   Right knee steroid injection administered today in office. Advised on duration between injections. Patient is eligible for repeat injection 3 months from today. Can consider visco injection after 6 weeks, if needed.     Given L knee pain and instability with no improvement with home exercises, NSAIDs, or steroid injection, will proceed with L knee MRI.     Follow up with MRI L knee results. Call with changes or concerns.       Patient was given instructions and counseling regarding her condition or for health maintenance advice. Please see specific  information pulled into the AVS if appropriate.

## 2022-11-07 ENCOUNTER — HOSPITAL ENCOUNTER (OUTPATIENT)
Dept: MRI IMAGING | Facility: HOSPITAL | Age: 72
Discharge: HOME | End: 2022-11-07
Admitting: PHYSICIAN ASSISTANT

## 2022-11-07 DIAGNOSIS — M25.562 ACUTE PAIN OF LEFT KNEE: ICD-10-CM

## 2022-11-07 PROCEDURE — 73721 MRI JNT OF LWR EXTRE W/O DYE: CPT

## 2022-11-10 ENCOUNTER — OFFICE VISIT (OUTPATIENT)
Dept: ORTHOPEDIC SURGERY | Facility: CLINIC | Age: 72
End: 2022-11-10

## 2022-11-10 VITALS — WEIGHT: 229 LBS | BODY MASS INDEX: 39.09 KG/M2 | HEIGHT: 64 IN

## 2022-11-10 DIAGNOSIS — M17.12 OSTEOARTHRITIS OF LEFT KNEE, UNSPECIFIED OSTEOARTHRITIS TYPE: ICD-10-CM

## 2022-11-10 DIAGNOSIS — S83.242D ACUTE MEDIAL MENISCUS TEAR OF LEFT KNEE, SUBSEQUENT ENCOUNTER: Primary | ICD-10-CM

## 2022-11-10 PROCEDURE — 20610 DRAIN/INJ JOINT/BURSA W/O US: CPT | Performed by: ORTHOPAEDIC SURGERY

## 2022-11-10 RX ORDER — TRIAMCINOLONE ACETONIDE 40 MG/ML
40 INJECTION, SUSPENSION INTRA-ARTICULAR; INTRAMUSCULAR
Status: COMPLETED | OUTPATIENT
Start: 2022-11-10 | End: 2022-11-10

## 2022-11-10 RX ORDER — LIDOCAINE HYDROCHLORIDE 10 MG/ML
5 INJECTION, SOLUTION INFILTRATION; PERINEURAL
Status: COMPLETED | OUTPATIENT
Start: 2022-11-10 | End: 2022-11-10

## 2022-11-10 RX ADMIN — LIDOCAINE HYDROCHLORIDE 5 ML: 10 INJECTION, SOLUTION INFILTRATION; PERINEURAL at 11:24

## 2022-11-10 RX ADMIN — TRIAMCINOLONE ACETONIDE 40 MG: 40 INJECTION, SUSPENSION INTRA-ARTICULAR; INTRAMUSCULAR at 11:24

## 2022-11-10 NOTE — PROGRESS NOTES
"Chief Complaint  Follow-up of the Left Knee     Subjective      Suzanne Gutierrez presents to [facility] for follow up of the left knee. The patient reports no known injury.  The patient was getting out of bed one morning and she couldn't put weight on it and she went back down on the bed.  The patient has had increase knee pain ever since.     Allergies   Allergen Reactions   • Cephalexin Itching   • Excedrin Migraine [Asa-Apap-Caff Buffered] GI Intolerance   • Gabapentin Unknown - High Severity   • Naproxen Unknown - High Severity        Social History     Socioeconomic History   • Marital status:    Tobacco Use   • Smoking status: Former     Packs/day: 1.00     Years: 30.00     Pack years: 30.00     Types: Cigarettes     Quit date: 2004     Years since quittin.3   • Smokeless tobacco: Never   Vaping Use   • Vaping Use: Never used   Substance and Sexual Activity   • Alcohol use: Never   • Drug use: Never   • Sexual activity: Not Currently        Review of Systems     Objective   Vital Signs:   Ht 162.6 cm (64\")   Wt 104 kg (229 lb)   BMI 39.31 kg/m²       Physical Exam  Constitutional:       Appearance: Normal appearance. Patient is well-developed and normal weight.   HENT:      Head: Normocephalic.      Right Ear: Hearing and external ear normal.      Left Ear: Hearing and external ear normal.      Nose: Nose normal.   Eyes:      Conjunctiva/sclera: Conjunctivae normal.   Cardiovascular:      Rate and Rhythm: Normal rate.   Pulmonary:      Effort: Pulmonary effort is normal.      Breath sounds: No wheezing or rales.   Abdominal:      Palpations: Abdomen is soft.      Tenderness: There is no abdominal tenderness.   Musculoskeletal:      Cervical back: Normal range of motion.   Skin:     Findings: No rash.   Neurological:      Mental Status: Patient is alert and oriented to person, place, and time.   Psychiatric:         Mood and Affect: Mood and affect normal.         " Judgment: Judgment normal.       Ortho Exam      LEFT KNEE Pain with palpation.  Dorsal pedal pulse 2+. Posterior tibialis pulse is 2+. Good tone of hip flexors, hip extensors, and hip abductors. ROM 5-110. Sensation intact. Neurovascular Intact. Slight swelling. Good strength to hamstrings, quadriceps, dorsiflexors, and plantar flexors.     Large Joint Arthrocentesis: L knee  Date/Time: 11/10/2022 11:24 AM  Consent given by: patient  Site marked: site marked  Timeout: Immediately prior to procedure a time out was called to verify the correct patient, procedure, equipment, support staff and site/side marked as required   Supporting Documentation  Indications: pain   Procedure Details  Location: knee - L knee  Needle gauge: 21g.  Medications administered: 40 mg triamcinolone acetonide 40 MG/ML; 5 mL lidocaine 1 %  Patient tolerance: patient tolerated the procedure well with no immediate complications            Imaging Results (Most Recent)     None           Result Review :         MRI Knee Left Without Contrast    Result Date: 11/7/2022  Narrative: PROCEDURE: MRI KNEE LEFT  WO CONTRAST  COMPARISON: None  INDICATIONS: LEFT LATERAL KNEE PAIN X 3 MONTHS. NO INJURY, NO CANCER. XRAYS WERE TAKEN AT THE ORTHO OFFICE.      TECHNIQUE: A complete multi-planar MRI was performed.   FINDINGS:  The cruciate ligaments, collateral ligaments, and extensor mechanism of the knee are intact.  There is thickening of the medial collateral ligament with surrounding fluid.  No focal defect identified.  Small to moderate-sized joint effusion identified.  There is a large partially ruptured Baker's cyst.  No additional collection identified.  Mild to moderate tricompartmental osteoarthritic changes are present.  Mild diffuse cartilage loss and partial thickness fissures are present within all 3 compartments.  Full-thickness fissures seen along the lateral facet of the patellar cartilage near the apex with underlying osseous edema.  No  significant intra-articular body identified.  There is an oblique tear of the posterior horn of the medial meniscus extending to the inferior articular surface (series 5, image 18).  There is a complex tear of the body of the lateral meniscus extending to the apex of the anterior horn.  No significant focal abnormal bone marrow signal is identified. The cortical margins are intact. No significant abnormal focal fluid collection is observed within the surrounding soft tissues.      Impression:   1. Oblique tear of the posterior horn of the medial meniscus. 2. Complex tear of the body of the lateral meniscus 6 extending to the apex of the anterior horn. 3. Mild-to-moderate tricompartmental osteoarthritis. 4. Acute on chronic sprain of the medial collateral ligament. 5. Large partially ruptured Baker's cyst. 6. Small to moderate-sized joint effusion..    GALINA BONILLA MD       Electronically Signed and Approved By: GALINA BONILLA MD on 11/07/2022 at 12:25                      Assessment and Plan     Diagnoses and all orders for this visit:    1. Acute medial meniscus tear of left knee, subsequent encounter (Primary)    2. Osteoarthritis of left knee, unspecified osteoarthritis type        Discussed the treatment plan with the patient. Discussed conservative measures as exercises, anti-inflammatory and injection. Discussed the treatment options with the patient, operative vs non-operative. The patient wanted to proceed with steroid injection and tolerated well.     Educated on risk of smoking. Discussed options for smoking cessation.  Call or return if worsening symptoms.    Follow Up     3-4 weeks.     Patient was given instructions and counseling regarding her condition or for health maintenance advice. Please see specific information pulled into the AVS if appropriate.     Scribed for Shiv Mckeon MD by Analia Basurto MA.  11/10/22   11:16 EST    I have personally performed the services described in this document  as scribed by the above individual and it is both accurate and complete. Shiv Mckeon MD 11/10/22

## 2022-11-21 ENCOUNTER — OFFICE VISIT (OUTPATIENT)
Dept: NEUROLOGY | Facility: CLINIC | Age: 72
End: 2022-11-21

## 2022-11-21 VITALS
WEIGHT: 229 LBS | SYSTOLIC BLOOD PRESSURE: 123 MMHG | BODY MASS INDEX: 39.09 KG/M2 | HEART RATE: 72 BPM | DIASTOLIC BLOOD PRESSURE: 61 MMHG | HEIGHT: 64 IN

## 2022-11-21 DIAGNOSIS — G40.009 PARTIAL IDIOPATHIC EPILEPSY WITH SEIZURES OF LOCALIZED ONSET, NOT INTRACTABLE, WITHOUT STATUS EPILEPTICUS: Primary | ICD-10-CM

## 2022-11-21 PROCEDURE — 99213 OFFICE O/P EST LOW 20 MIN: CPT | Performed by: NURSE PRACTITIONER

## 2022-11-21 RX ORDER — LEVETIRACETAM 500 MG/1
500 TABLET ORAL 2 TIMES DAILY
Qty: 180 TABLET | Refills: 3 | Status: SHIPPED | OUTPATIENT
Start: 2022-11-21

## 2022-11-21 NOTE — PROGRESS NOTES
"Chief Complaint  Seizures    Subjective          Suzanne Gutierrez presents to [facility]  History of Present Illness  Denies seizures since previous visit.  States she's been doing well with Keppra.  Denies side effect.       Objective   Vital Signs:   /61   Pulse 72   Ht 162.6 cm (64\")   Wt 104 kg (229 lb)   BMI 39.31 kg/m²     Physical Exam  HENT:      Head: Normocephalic.   Pulmonary:      Effort: Pulmonary effort is normal.   Neurological:      Mental Status: She is alert and oriented to person, place, and time.      Cranial Nerves: Cranial nerves are intact.      Sensory: Sensation is intact.      Motor: Motor function is intact.      Coordination: Coordination is intact.      Deep Tendon Reflexes: Reflexes are normal and symmetric.        Neurologic Exam     Mental Status   Oriented to person, place, and time.        Result Review :   CBC:  Lab Results   Component Value Date    WBC 7.26 05/10/2022    RBC 4.11 05/10/2022    HGB 12.4 05/10/2022    HCT 37.3 05/10/2022    MCV 90.8 05/10/2022    MCH 30.2 05/10/2022    MCHC 33.2 05/10/2022    RDW 12.7 05/10/2022     05/10/2022     CMP:  Lab Results   Component Value Date    BUN 15 04/22/2022    CREATININE 0.83 04/22/2022    EGFRIFNONA 72 07/12/2021     04/22/2022    K 4.2 04/22/2022     04/22/2022    CALCIUM 9.2 04/22/2022    ALBUMIN 3.90 04/22/2022    BILITOT 0.5 04/22/2022    ALKPHOS 66 04/22/2022    AST 17 04/22/2022    ALT 12 04/22/2022               Assessment and Plan    Diagnoses and all orders for this visit:    1. Partial idiopathic epilepsy with seizures of localized onset, not intractable, without status epilepticus (HCC) (Primary)  Assessment & Plan:  Continue Keppra.  Discussed routine seizure precautions including, but not limited to, avoiding bathing alone, swimming alone, climbing on ladders.  Also discussed need for medication compliance and risks of unmanaged epilepsy including " status epilepticus, permanent brain injury or potentially death.  Patient is aware of [place] state law regarding no driving for 90 days following a seizure.        Other orders  -     levETIRAcetam (KEPPRA) 500 MG tablet; Take 1 tablet by mouth 2 (Two) Times a Day.  Dispense: 180 tablet; Refill: 3      Follow Up   No follow-ups on file.  Patient was given instructions and counseling regarding her condition or for health maintenance advice. Please see specific information pulled into the AVS if appropriate.

## 2022-11-21 NOTE — PATIENT INSTRUCTIONS
Seizure, Adult  A seizure is a sudden burst of abnormal electrical and chemical activity in the brain. Seizures usually last from 30 seconds to 2 minutes. The abnormal activity temporarily interrupts normal brain function.  Many types of seizures can affect adults. A seizure can cause many different symptoms depending on where in the brain it starts.  What are the causes?  Common causes of this condition include:  Fever or infection.  Brain injury, head trauma, bleeding in the brain, or a brain tumor.  Low levels of blood sugar or salt (sodium).  Kidney problems or liver problems.  Metabolic disorders or other conditions that are passed from parent to child (are inherited).  Reaction to a substance, such as a drug or a medicine, or suddenly stopping the use of a substance (withdrawal).  A stroke.  Developmental disorders such as autism spectrum disorder or cerebral palsy.  In some cases, the cause of a seizure may not be known. Some people who have a seizure never have another one. A person who has repeated seizures over time without a clear cause has a condition called epilepsy.  What increases the risk?  You are more likely to develop this condition if:  You have a family history of epilepsy.  You have had a tonic-clonic seizure before. This type of seizure causes tightening (contraction) of the muscles of the whole body and loss of consciousness.  You have a history of head trauma, lack of oxygen at birth, or strokes.  What are the signs or symptoms?  There are many different types of seizures. The symptoms vary depending on the type of seizure you have. Symptoms occur during the seizure. They may also occur before a seizure (aura) and after a seizure (postictal). Symptoms may include the following:  Symptoms during a seizure  Uncontrollable shaking (convulsions) with fast, jerky movements of muscles.  Stiffening of the body.  Breathing problems.  Confusion, staring, or unresponsiveness.  Head nodding, eye  blinking or fluttering, or rapid eye movements.  Drooling, grunting, or making clicking sounds with your mouth.  Loss of bladder control and bowel control.  Symptoms before a seizure  Fear or anxiety.  Nausea.  Vertigo. This is a feeling like:  You are moving when you are not.  Your surroundings are moving when they are not.  Déjà vu. This is a feeling of having seen or heard something before.  Odd tastes or smells.  Changes in vision, such as seeing flashing lights or spots.  Symptoms after a seizure  Confusion.  Sleepiness.  Headache.  Sore muscles.  How is this diagnosed?  This condition may be diagnosed based on:  A description of your symptoms. Video of your seizures can be helpful.  Your medical history.  A physical exam.  You may also have tests, including:  Blood tests.  CT scan.  MRI.  Electroencephalogram (EEG). This test measures electrical activity in the brain. An EEG can predict whether seizures will return.  A spinal tap, also called a lumbar puncture. This is the removal and testing of fluid that surrounds the brain and spinal cord.  How is this treated?  Most seizures will stop on their own in less than 5 minutes, and no treatment is needed. Seizures that last longer than 5 minutes will usually need treatment.  Seizures may be treated with:  Medicines given through an IV.  Avoiding known triggers, such as medicines that you take for another condition.  Medicines to control seizures or prevent future seizures (antiepileptics), if epilepsy caused your seizures.  Medical devices to prevent and control seizures.  Surgery to stop seizures or to reduce how often seizures happen, if you have epilepsy that does not respond to medicines.  A diet low in carbohydrates and high in fat (ketogenic diet).  Follow these instructions at home:  Medicines  Take over-the-counter and prescription medicines only as told by your health care provider.  Avoid any substances that may prevent your medicine from working  properly, such as alcohol.  Activity  Follow instructions about activities, such as driving or swimming, that would be dangerous if you had another seizure. Wait until your health care provider says it is safe to do them.  If you live in the [place] check with your local department of motor vehicles (DMV) to find out about local driving laws. Each state has specific rules about when you can legally drive again.  Get enough rest. Lack of sleep can make seizures more likely to occur.  Educating others    Teach friends and family what to do if you have a seizure. They should:  Help you get down to the ground, to prevent a fall.  Cushion your head and move items away from your body.  Loosen any tight clothing around your neck.  Turn you on your side. If you vomit, this helps keep your airway clear.  Know whether or not you need emergency care.  Stay with you until you recover.  Also, tell them what not to do if you have a seizure. Tell them:  They should not hold you down. Holding you down will not stop the seizure.  They should not put anything in your mouth.  General instructions  Avoid anything that has ever triggered a seizure for you.  Keep a seizure diary. Record what you remember about each seizure, especially anything that might have triggered it.  Keep all follow-up visits. This is important.  Contact a health care provider if:  You have another seizure or seizures. Call each time you have a seizure.  Your seizure pattern changes.  You continue to have seizures with treatment.  You have symptoms of an infection or illness. Either of these might increase your risk of having a seizure.  You are unable to take your medicine.  Get help right away if:  You have:  A seizure that does not stop after 5 minutes.  Several seizures in a row without a complete recovery between seizures.  A seizure that makes it harder to breathe.  A seizure that leaves you unable to speak or use a part of your body.  You do not wake up right  away after a seizure.  You injure yourself during a seizure.  You have confusion or pain right after a seizure.  These symptoms may represent a serious problem that is an emergency. Do not wait to see if the symptoms will go away. Get medical help right away. Call your local emergency services (911 in the [place] Do not drive yourself to the hospital.  Summary  Seizures are caused by abnormal electrical and chemical activity in the brain. The activity disrupts normal brain function and can cause various symptoms.  Seizures have many causes, including illness, head injuries, low levels of blood sugar or salt, and certain conditions.  Most seizures will stop on their own in less than 5 minutes. Seizures that last longer than 5 minutes are a medical emergency and need treatment right away.  Many medicines are used to treat seizures. Take over-the-counter and prescription medicines only as told by your health care provider.  This information is not intended to replace advice given to you by your health care provider. Make sure you discuss any questions you have with your health care provider.  Document Revised: 06/25/2021 Document Reviewed: 06/25/2021  Elsevier Patient Education © 2022 [employer]

## 2022-12-19 RX ORDER — ALBUTEROL SULFATE 90 UG/1
2 AEROSOL, METERED RESPIRATORY (INHALATION) EVERY 6 HOURS PRN
Qty: 18 G | Refills: 0 | Status: SHIPPED | OUTPATIENT
Start: 2022-12-19 | End: 2023-02-24

## 2022-12-19 NOTE — TELEPHONE ENCOUNTER
Caller: Suzanne Gutierrez    Relationship: Self    Best call back number: [phone]    Requested Prescriptions:   Requested Prescriptions     Pending Prescriptions Disp Refills   • albuterol sulfate  (90 Base) MCG/ACT inhaler          Pharmacy where request should be sent: [facility] #[id number] [place], [place] - [address] AT [facility] - [phone]  - [phone] FX     Does the patient have less than a 3 day supply:  [x] Yes  [] No    Would you like a call back once the refill request has been completed: [x] Yes [] No    If the office needs to give you a call back, can they leave a voicemail: [x] Yes [] No

## 2022-12-27 ENCOUNTER — TELEPHONE (OUTPATIENT)
Dept: ORTHOPEDIC SURGERY | Facility: CLINIC | Age: 72
End: 2022-12-27

## 2022-12-27 NOTE — TELEPHONE ENCOUNTER
Caller: BIRD SCALES     Relationship to patient: SELF    Best call back number: [phone]    Chief complaint: LEFT KNEE INJECTION    Type of visit: LEFT KNEE INJECTION    Requested date: LATER MORNING     If rescheduling, when is the original appointment: NA    Additional notes: NA - PLEASE CALL CELL PHONE -[phone] IF YOU CAN'T REACH HER AT HER WORK PHONE

## 2022-12-28 RX ORDER — IPRATROPIUM BROMIDE AND ALBUTEROL SULFATE 2.5; .5 MG/3ML; MG/3ML
SOLUTION RESPIRATORY (INHALATION)
Qty: 360 ML | OUTPATIENT
Start: 2022-12-28

## 2022-12-28 NOTE — TELEPHONE ENCOUNTER
Caller: Dubois Suzanne AYAZ    Relationship: Self    Best call back number: [phone]    Requested Prescriptions:   Requested Prescriptions     Pending Prescriptions Disp Refills   • ipratropium-albuterol (DUO-NEB) 0.5-2.5 mg/3 ml nebulizer 360 mL         Pharmacy where request should be sent: [id number] [address] AT [facility] [phone] [facility] [phone] FX     Additional details provided by patient: PATIENT IS OUT OF MEDICATION FOR HER NEBULIZER    Does the patient have less than a 3 day supply:  [x] Yes  [] No    Would you like a call back once the refill request has been completed: [] Yes [x] No      Richmond Hernandez Rep   12/28/22 09:23 EST

## 2023-01-04 ENCOUNTER — TELEPHONE (OUTPATIENT)
Dept: OBSTETRICS AND GYNECOLOGY | Facility: CLINIC | Age: 73
End: 2023-01-04
Payer: MEDICARE

## 2023-01-04 DIAGNOSIS — B37.9 YEAST INFECTION: Primary | ICD-10-CM

## 2023-01-04 RX ORDER — FLUCONAZOLE 150 MG/1
TABLET ORAL
Qty: 2 TABLET | Refills: 1 | Status: SHIPPED | OUTPATIENT
Start: 2023-01-04

## 2023-01-04 NOTE — TELEPHONE ENCOUNTER
Patient called this am.  She having discharge & itching.  Per protocol Rx for Diflucan sent to her pharmacy.  Last seen 8-29-22

## 2023-01-12 ENCOUNTER — OFFICE VISIT (OUTPATIENT)
Dept: FAMILY MEDICINE CLINIC | Facility: CLINIC | Age: 73
End: 2023-01-12
Payer: MEDICARE

## 2023-01-12 VITALS
BODY MASS INDEX: 38.58 KG/M2 | TEMPERATURE: 96.4 F | HEART RATE: 88 BPM | WEIGHT: 226 LBS | DIASTOLIC BLOOD PRESSURE: 72 MMHG | SYSTOLIC BLOOD PRESSURE: 126 MMHG | OXYGEN SATURATION: 91 % | HEIGHT: 64 IN

## 2023-01-12 DIAGNOSIS — E78.2 MIXED HYPERLIPIDEMIA: ICD-10-CM

## 2023-01-12 DIAGNOSIS — R09.81 SINUS CONGESTION: ICD-10-CM

## 2023-01-12 DIAGNOSIS — G47.00 INSOMNIA, UNSPECIFIED TYPE: ICD-10-CM

## 2023-01-12 DIAGNOSIS — J44.1 COPD WITH EXACERBATION: ICD-10-CM

## 2023-01-12 DIAGNOSIS — Z12.31 SCREENING MAMMOGRAM FOR BREAST CANCER: ICD-10-CM

## 2023-01-12 DIAGNOSIS — J06.9 UPPER RESPIRATORY TRACT INFECTION, UNSPECIFIED TYPE: ICD-10-CM

## 2023-01-12 DIAGNOSIS — R42 VERTIGO: ICD-10-CM

## 2023-01-12 DIAGNOSIS — R73.03 PREDIABETES: Primary | ICD-10-CM

## 2023-01-12 DIAGNOSIS — J44.9 COPD MIXED TYPE: ICD-10-CM

## 2023-01-12 LAB
ALBUMIN SERPL-MCNC: 4 G/DL (ref 3.5–5.2)
ALBUMIN/GLOB SERPL: 1.5 G/DL
ALP SERPL-CCNC: 83 U/L (ref 39–117)
ALT SERPL W P-5'-P-CCNC: 13 U/L (ref 1–33)
ANION GAP SERPL CALCULATED.3IONS-SCNC: 9.1 MMOL/L (ref 5–15)
AST SERPL-CCNC: 17 U/L (ref 1–32)
BASOPHILS # BLD AUTO: 0.06 10*3/MM3 (ref 0–0.2)
BASOPHILS NFR BLD AUTO: 0.8 % (ref 0–1.5)
BILIRUB SERPL-MCNC: <0.2 MG/DL (ref 0–1.2)
BUN SERPL-MCNC: 13 MG/DL (ref 8–23)
BUN/CREAT SERPL: 17.1 (ref 7–25)
CALCIUM SPEC-SCNC: 9.3 MG/DL (ref 8.6–10.5)
CHLORIDE SERPL-SCNC: 102 MMOL/L (ref 98–107)
CHOLEST SERPL-MCNC: 193 MG/DL (ref 0–200)
CO2 SERPL-SCNC: 30.9 MMOL/L (ref 22–29)
CREAT SERPL-MCNC: 0.76 MG/DL (ref 0.57–1)
DEPRECATED RDW RBC AUTO: 41.7 FL (ref 37–54)
EGFRCR SERPLBLD CKD-EPI 2021: 83.4 ML/MIN/1.73
EOSINOPHIL # BLD AUTO: 0.27 10*3/MM3 (ref 0–0.4)
EOSINOPHIL NFR BLD AUTO: 3.7 % (ref 0.3–6.2)
ERYTHROCYTE [DISTWIDTH] IN BLOOD BY AUTOMATED COUNT: 12.7 % (ref 12.3–15.4)
EXPIRATION DATE: NORMAL
GLOBULIN UR ELPH-MCNC: 2.6 GM/DL
GLUCOSE SERPL-MCNC: 76 MG/DL (ref 65–99)
HBA1C MFR BLD: 5.8 % (ref 4.8–5.6)
HCT VFR BLD AUTO: 35.9 % (ref 34–46.6)
HDLC SERPL-MCNC: 51 MG/DL (ref 40–60)
HGB BLD-MCNC: 12.2 G/DL (ref 12–15.9)
IMM GRANULOCYTES # BLD AUTO: 0.03 10*3/MM3 (ref 0–0.05)
IMM GRANULOCYTES NFR BLD AUTO: 0.4 % (ref 0–0.5)
INTERNAL CONTROL: NORMAL
LDLC SERPL CALC-MCNC: 112 MG/DL (ref 0–100)
LDLC/HDLC SERPL: 2.12 {RATIO}
LYMPHOCYTES # BLD AUTO: 2 10*3/MM3 (ref 0.7–3.1)
LYMPHOCYTES NFR BLD AUTO: 27.6 % (ref 19.6–45.3)
Lab: NORMAL
MCH RBC QN AUTO: 30 PG (ref 26.6–33)
MCHC RBC AUTO-ENTMCNC: 34 G/DL (ref 31.5–35.7)
MCV RBC AUTO: 88.4 FL (ref 79–97)
MONOCYTES # BLD AUTO: 0.66 10*3/MM3 (ref 0.1–0.9)
MONOCYTES NFR BLD AUTO: 9.1 % (ref 5–12)
NEUTROPHILS NFR BLD AUTO: 4.23 10*3/MM3 (ref 1.7–7)
NEUTROPHILS NFR BLD AUTO: 58.4 % (ref 42.7–76)
NRBC BLD AUTO-RTO: 0 /100 WBC (ref 0–0.2)
PLATELET # BLD AUTO: 207 10*3/MM3 (ref 140–450)
PMV BLD AUTO: 10.4 FL (ref 6–12)
POTASSIUM SERPL-SCNC: 4 MMOL/L (ref 3.5–5.2)
PROT SERPL-MCNC: 6.6 G/DL (ref 6–8.5)
RBC # BLD AUTO: 4.06 10*6/MM3 (ref 3.77–5.28)
SARS-COV-2 AG UPPER RESP QL IA.RAPID: NOT DETECTED
SODIUM SERPL-SCNC: 142 MMOL/L (ref 136–145)
TRIGL SERPL-MCNC: 170 MG/DL (ref 0–150)
TSH SERPL DL<=0.05 MIU/L-ACNC: 2.11 UIU/ML (ref 0.27–4.2)
VLDLC SERPL-MCNC: 30 MG/DL (ref 5–40)
WBC NRBC COR # BLD: 7.25 10*3/MM3 (ref 3.4–10.8)

## 2023-01-12 PROCEDURE — 80061 LIPID PANEL: CPT | Performed by: NURSE PRACTITIONER

## 2023-01-12 PROCEDURE — 80053 COMPREHEN METABOLIC PANEL: CPT | Performed by: NURSE PRACTITIONER

## 2023-01-12 PROCEDURE — 84443 ASSAY THYROID STIM HORMONE: CPT | Performed by: NURSE PRACTITIONER

## 2023-01-12 PROCEDURE — 99214 OFFICE O/P EST MOD 30 MIN: CPT | Performed by: NURSE PRACTITIONER

## 2023-01-12 PROCEDURE — 36415 COLL VENOUS BLD VENIPUNCTURE: CPT | Performed by: NURSE PRACTITIONER

## 2023-01-12 PROCEDURE — 83036 HEMOGLOBIN GLYCOSYLATED A1C: CPT | Performed by: NURSE PRACTITIONER

## 2023-01-12 PROCEDURE — 85025 COMPLETE CBC W/AUTO DIFF WBC: CPT | Performed by: NURSE PRACTITIONER

## 2023-01-12 PROCEDURE — 87426 SARSCOV CORONAVIRUS AG IA: CPT | Performed by: NURSE PRACTITIONER

## 2023-01-12 RX ORDER — AMITRIPTYLINE HYDROCHLORIDE 100 MG/1
100 TABLET, FILM COATED ORAL NIGHTLY
Qty: 90 TABLET | Refills: 1 | Status: SHIPPED | OUTPATIENT
Start: 2023-01-12

## 2023-01-12 RX ORDER — MECLIZINE HYDROCHLORIDE 25 MG/1
25 TABLET ORAL 3 TIMES DAILY PRN
Qty: 90 TABLET | Refills: 0 | Status: SHIPPED | OUTPATIENT
Start: 2023-01-12 | End: 2023-04-05

## 2023-01-12 RX ORDER — IPRATROPIUM BROMIDE AND ALBUTEROL SULFATE 2.5; .5 MG/3ML; MG/3ML
3 SOLUTION RESPIRATORY (INHALATION) 3 TIMES DAILY PRN
Qty: 180 ML | Refills: 1 | Status: SHIPPED | OUTPATIENT
Start: 2023-01-12

## 2023-01-12 RX ORDER — AZITHROMYCIN 250 MG/1
TABLET, FILM COATED ORAL
Qty: 6 TABLET | Refills: 0 | Status: SHIPPED | OUTPATIENT
Start: 2023-01-12

## 2023-01-12 NOTE — PROGRESS NOTES
Chief Complaint  Med Refill (She also thinks she has a sinus infection. )    Subjective            Suzanne Gutierrez presents to [facility]  History of Present Illness  Patient is here today for medication refills:    --insomnia; the elavil is working great and pt not having any SE and no issues no SI/HI     --vertigo:--intermittent and uses the meclizine during a flareup and been to the ENT as well--    --with regards to the COPD and the lung fibrosis--pt sees pulmonary and then does need the nebulizer at times and then albuterol inhaler when not as flared up     She also reports she feels as though she has a sinus infection--s/s started 2 days ago--and drainage of the throat and then no fever no chills no N.V.D.--causing the pt to cough and lungs to flare up and pt lungs are pretty fragile with the COPD and fibrosis       PHQ-2 Total Score: 0  PHQ-9 Total Score: 0    Past Medical History:   Diagnosis Date   • Diverticulitis    • Insomnia    • Oxygen dependent     wears 02 at hs only   • Pre-diabetes    • Pulmonary fibrosis (HCC)    • Seizures (HCC)    • Sleep apnea    • Vertigo    • Vertigo        Allergies   Allergen Reactions   • Cephalexin Itching   • Excedrin Migraine [Asa-Apap-Caff Buffered] GI Intolerance   • Gabapentin Unknown - High Severity   • Naproxen Unknown - High Severity        Past Surgical History:   Procedure Laterality Date   • APPENDECTOMY     • CHOLECYSTECTOMY     • COLONOSCOPY N/A 5/2/2022    Procedure: COLONOSCOPY WITH POLYPECTOMIES, BX;  Surgeon: Nicko Aranda MD;  Location: [facility] END[facility];  Service: Gastroenterology;  Laterality: N/A;  COLON POLYPS, DIVERTICULOSIS, HEMORRHOIDS, RECTAL ULCER   • HYSTERECTOMY     • TONSILLECTOMY     • TUBAL ABDOMINAL LIGATION          Social History     Tobacco Use   • Smoking status: Former     Packs/day: 1.00     Years: 30.00     Pack years: 30.00     Types: Cigarettes     Quit date: 7/8/2004     Years since  quittin.5   • Smokeless tobacco: Never   Vaping Use   • Vaping Use: Never used   Substance Use Topics   • Alcohol use: Never   • Drug use: Never       Family History   Problem Relation Age of Onset   • Alcohol abuse Father    • Prostate cancer Brother    • Breast cancer Sister    • Breast cancer Maternal Aunt    • Colon cancer Neg Hx    • Ovarian cancer Neg Hx    • Uterine cancer Neg Hx         Health Maintenance Due   Topic Date Due   • ZOSTER VACCINE (2 of 3) 2019   • DXA SCAN  10/23/2022        Current Outpatient Medications on File Prior to Visit   Medication Sig   • albuterol sulfate  (90 Base) MCG/ACT inhaler Inhale 2 puffs Every 6 (Six) Hours As Needed for Wheezing or Shortness of Air.   • aspirin 81 MG chewable tablet aspirin 81 mg oral tablet,chewable chew 1 tablet (81 mg) by oral route once daily   Active   • Esbriet 267 MG capsule    • fluconazole (Diflucan) 150 MG tablet Take on tablet today, then repeat in 3 days   • Fluticasone-Umeclidin-Vilant (Trelegy Ellipta) 100-62.5-25 MCG/INH inhaler Inhale 1 puff Daily.   • levETIRAcetam (KEPPRA) 500 MG tablet Take 1 tablet by mouth 2 (Two) Times a Day.   • pregabalin (LYRICA) 100 MG capsule Take 100 mg by mouth 2 (Two) Times a Day.   • vitamin D3 125 MCG (5000 UT) capsule capsule cholecalciferol (vitamin D3) 125 mcg (5,000 unit) oral capsule take 1 capsule by oral route daily   Active   • [DISCONTINUED] amitriptyline (ELAVIL) 100 MG tablet TAKE 1 TABLET EVERY NIGHT   • [DISCONTINUED] ipratropium-albuterol (DUO-NEB) 0.5-2.5 mg/3 ml nebulizer ipratropium 0.5 mg-albuterol 3 mg (2.5 mg base)/3 mL nebulization soln   • [DISCONTINUED] meclizine (ANTIVERT) 25 MG tablet Take 1 tablet by mouth 3 (Three) Times a Day As Needed for Dizziness.   • [DISCONTINUED] diclofenac (VOLTAREN) 75 MG EC tablet Take 1 tablet by mouth 2 (Two) Times a Day.   • [DISCONTINUED] HYDROcodone-acetaminophen (NORCO) 5-325 MG per tablet Take 1 tablet by mouth Every 6 (Six) Hours  "As Needed for Moderate Pain.     No current facility-administered medications on file prior to visit.       Immunization History   Administered Date(s) Administered   • COVID-19 (MODERNA) 1st, 2nd, 3rd Dose Only 12/15/2021   • COVID-19 (MODERNA) BOOSTER 12/15/2021   • COVID-19 (PFIZER) BIVALENT BOOSTER 12+YRS 11/03/2022   • COVID-19 (PFIZER) PURPLE CAP 05/07/2021, 06/01/2021   • Fluad Quad 65+ 11/03/2022   • Fluzone High-Dose 65+yrs 10/02/2021   • Influenza Quad Vaccine (Inpatient) 09/12/2014   • PPD Test 07/15/2021   • Pneumococcal Conjugate 13-Valent (PCV13) 06/19/2017   • Pneumococcal Polysaccharide (PPSV23) 07/03/2019   • Tdap 04/06/2022   • Zostavax 06/19/2017, 07/03/2019       Review of Systems   Constitutional: Negative for chills and fever.   HENT: Positive for congestion, ear pain, postnasal drip, rhinorrhea, sinus pressure and sore throat.    Eyes: Negative for blurred vision and double vision.   Respiratory: Positive for cough, shortness of breath and wheezing.    Gastrointestinal: Negative for abdominal pain, blood in stool, diarrhea, nausea and vomiting.   Endocrine: Positive for polyphagia. Negative for polydipsia and polyuria.   Genitourinary: Negative for breast discharge, breast lump, breast pain, dysuria and vaginal bleeding.   Musculoskeletal: Positive for arthralgias.        Bilat knees    Skin: Negative for wound.   Neurological: Positive for headache. Negative for dizziness, seizures, syncope and light-headedness.   Psychiatric/Behavioral: Positive for sleep disturbance. Negative for self-injury, suicidal ideas and depressed mood. The patient is not nervous/anxious.         Objective     /72 (BP Location: Right arm, Patient Position: Sitting, Cuff Size: Adult)   Pulse 88   Temp 96.4 °F (35.8 °C) (Temporal)   Ht 162.6 cm (64\")   Wt 103 kg (226 lb)   SpO2 91%   BMI 38.79 kg/m²       Physical Exam  Vitals and nursing note reviewed.   Constitutional:       Appearance: Normal appearance. "   HENT:      Head: Normocephalic.      Right Ear: Tympanic membrane, ear canal and external ear normal.      Left Ear: Tympanic membrane, ear canal and external ear normal.      Nose:      Right Sinus: Maxillary sinus tenderness present. No frontal sinus tenderness.      Left Sinus: Maxillary sinus tenderness present. No frontal sinus tenderness.      Mouth/Throat:      Mouth: Mucous membranes are moist.   Eyes:      Pupils: Pupils are equal, round, and reactive to light.   Cardiovascular:      Rate and Rhythm: Normal rate and regular rhythm.      Heart sounds: Normal heart sounds.   Pulmonary:      Effort: Pulmonary effort is normal.      Breath sounds: Normal breath sounds.      Comments: But a congested cough  Abdominal:      General: There is no distension.      Palpations: Abdomen is soft.   Musculoskeletal:      Cervical back: Normal range of motion and neck supple.      Comments: bilat knee pain chronically    Skin:     General: Skin is warm and dry.   Neurological:      Mental Status: She is alert and oriented to person, place, and time.   Psychiatric:         Mood and Affect: Mood normal.         Behavior: Behavior normal.         Thought Content: Thought content normal.         Judgment: Judgment normal.         Result Review :           COMPREHENSIVE METABOLIC PANEL (08/10/2022 11:14)  IRON PROFILE (08/10/2022 11:14)  VITAMIN B12 (08/10/2022 11:14)  FERRITIN (08/10/2022 11:14)  CONV CBC/D/PLT+PATHOL (08/10/2022 11:14)  AUTODIFF (08/10/2022 11:14)  POCT SARS-CoV-2 Antigen EMILY (01/12/2023 12:05)             Assessment and Plan      Diagnoses and all orders for this visit:    1. Prediabetes (Primary)  -     CBC & Differential  -     Comprehensive Metabolic Panel  -     Hemoglobin A1c  -     Lipid Panel  -     TSH    2. Insomnia, unspecified type  -     amitriptyline (ELAVIL) 100 MG tablet; Take 1 tablet by mouth Every Night.  Dispense: 90 tablet; Refill: 1  -     Comprehensive Metabolic Panel  -      TSH    3. Vertigo  -     meclizine (ANTIVERT) 25 MG tablet; Take 1 tablet by mouth 3 (Three) Times a Day As Needed for Dizziness.  Dispense: 90 tablet; Refill: 0  -     TSH    4. COPD mixed type (HCC)  -     ipratropium-albuterol (DUO-NEB) 0.5-2.5 mg/3 ml nebulizer; Take 3 mL by nebulization 3 (Three) Times a Day As Needed for Wheezing or Shortness of Air.  Dispense: 180 mL; Refill: 1  -     CBC & Differential    5. Mixed hyperlipidemia  -     CBC & Differential  -     Comprehensive Metabolic Panel  -     Lipid Panel  -     TSH    6. Screening mammogram for breast cancer  -     Mammo Screening Digital Tomosynthesis Bilateral With CAD; Future    7. Upper respiratory tract infection, unspecified type  -     POCT SARS-CoV-2 Antigen EMILY  -     azithromycin (Zithromax Z-Mark) 250 MG tablet; Take 2 tablets by mouth on day 1, then 1 tablet daily on days 2-5  Dispense: 6 tablet; Refill: 0    8. Sinus congestion  -     azithromycin (Zithromax Z-Mark) 250 MG tablet; Take 2 tablets by mouth on day 1, then 1 tablet daily on days 2-5  Dispense: 6 tablet; Refill: 0    9. COPD with exacerbation (HCC)  -     azithromycin (Zithromax Z-Mark) 250 MG tablet; Take 2 tablets by mouth on day 1, then 1 tablet daily on days 2-5  Dispense: 6 tablet; Refill: 0            Follow Up     Return in about 6 months (around 7/12/2023), or if symptoms worsen or fail to improve, for Recheck.

## 2023-01-12 NOTE — PROGRESS NOTES
Venipuncture Blood Specimen Collection  Venipuncture performed in left arm by Rosalee Juarez with good hemostasis. Patient tolerated the procedure well without complications.   01/12/23   Rosalee Juarez

## 2023-01-13 NOTE — PROGRESS NOTES
Please mail letter to patient stating    Suzanne, comprehensive panel shows normal kidney and liver function test and normal electrolytes and normal glucose; the cholesterol panel shows slightly elevated triglycerides at 178 and should be less than 150 when fasting and the HDL was in normal range and the LDL was just slightly elevated at 112; the hemoglobin A1c was still in the prediabetic range at 5.8%; and your thyroid levels and blood counts were normal range

## 2023-01-25 ENCOUNTER — TRANSCRIBE ORDERS (OUTPATIENT)
Dept: SLEEP MEDICINE | Facility: HOSPITAL | Age: 73
End: 2023-01-25
Payer: MEDICARE

## 2023-01-25 DIAGNOSIS — G47.33 OBSTRUCTIVE SLEEP APNEA (ADULT) (PEDIATRIC): Primary | ICD-10-CM

## 2023-01-30 ENCOUNTER — TELEPHONE (OUTPATIENT)
Dept: FAMILY MEDICINE CLINIC | Facility: CLINIC | Age: 73
End: 2023-01-30

## 2023-01-30 DIAGNOSIS — R21 RASH: Primary | ICD-10-CM

## 2023-01-30 NOTE — TELEPHONE ENCOUNTER
Caller: Suzanne Gutierrez    Relationship: Self    Best call back number: [phone]    What is the medical concern/diagnosis: RASH ON HANDS AND ARMS    What specialty or service is being requested: DERMATOLOGIST    What is the office location: [place]

## 2023-02-06 ENCOUNTER — HOSPITAL ENCOUNTER (OUTPATIENT)
Dept: SLEEP MEDICINE | Facility: HOSPITAL | Age: 73
Discharge: HOME | End: 2023-02-06
Admitting: INTERNAL MEDICINE
Payer: MEDICARE

## 2023-02-06 DIAGNOSIS — G47.33 OBSTRUCTIVE SLEEP APNEA (ADULT) (PEDIATRIC): ICD-10-CM

## 2023-02-06 PROCEDURE — 95810 POLYSOM 6/> YRS 4/> PARAM: CPT

## 2023-02-06 PROCEDURE — 95810 POLYSOM 6/> YRS 4/> PARAM: CPT | Performed by: INTERNAL MEDICINE

## 2023-02-10 ENCOUNTER — OFFICE VISIT (OUTPATIENT)
Dept: ORTHOPEDIC SURGERY | Facility: CLINIC | Age: 73
End: 2023-02-10
Payer: MEDICARE

## 2023-02-10 VITALS — HEIGHT: 64 IN | BODY MASS INDEX: 38.58 KG/M2 | WEIGHT: 226 LBS

## 2023-02-10 DIAGNOSIS — M17.0 BILATERAL PRIMARY OSTEOARTHRITIS OF KNEE: Primary | ICD-10-CM

## 2023-02-10 PROCEDURE — 20610 DRAIN/INJ JOINT/BURSA W/O US: CPT | Performed by: ORTHOPAEDIC SURGERY

## 2023-02-10 NOTE — PROGRESS NOTES
"Chief Complaint  Follow-up and Pain of the Left Knee and Pain and Follow-up of the Right Knee     Subjective      Suzanne Gutierrez presents to [facility] for follow up of bilateral knees.She has a history of osteo arthritis.  Her left knee is worse then the right. She had steroid injections in the past and that worked for about two weeks. She has pain on the lateral joint line and across the patella.  She has difficulty with prolonged walking and ambulation.       Allergies   Allergen Reactions   • Cephalexin Itching   • Excedrin Migraine [Asa-Apap-Caff Buffered] GI Intolerance   • Gabapentin Unknown - High Severity   • Naproxen Unknown - High Severity        Social History     Socioeconomic History   • Marital status:    Tobacco Use   • Smoking status: Former     Packs/day: 1.00     Years: 30.00     Pack years: 30.00     Types: Cigarettes     Quit date: 2004     Years since quittin.6   • Smokeless tobacco: Never   Vaping Use   • Vaping Use: Never used   Substance and Sexual Activity   • Alcohol use: Never   • Drug use: Never   • Sexual activity: Not Currently        Review of Systems     Objective   Vital Signs:   Ht 162.6 cm (64\")   Wt 103 kg (226 lb)   BMI 38.79 kg/m²       Physical Exam  Constitutional:       Appearance: Normal appearance. Patient is well-developed and normal weight.   HENT:      Head: Normocephalic.      Right Ear: Hearing and external ear normal.      Left Ear: Hearing and external ear normal.      Nose: Nose normal.   Eyes:      Conjunctiva/sclera: Conjunctivae normal.   Cardiovascular:      Rate and Rhythm: Normal rate.   Pulmonary:      Effort: Pulmonary effort is normal.      Breath sounds: No wheezing or rales.   Abdominal:      Palpations: Abdomen is soft.      Tenderness: There is no abdominal tenderness.   Musculoskeletal:      Cervical back: Normal range of motion.   Skin:     Findings: No rash.   Neurological:      Mental Status: Patient " is alert and oriented to person, place, and time.   Psychiatric:         Mood and Affect: Mood and affect normal.         Judgment: Judgment normal.       Ortho Exam      BILATERAL KNEES Flexion 120 RIGHT 95 LEFT. Extension 0 RIGHT -5 LEFT. Stable to varus/valgus stress. Stable to anterior/posterior drawer. Neurovascularly intact. Negative Bruce. Negative Lachman. Positive EHL, FHL, HS and TA. Sensation intact to light touch all 5 nerves of the foot. Ambulates with Antalgic gait. Patella is well tracking. Calf supple, non-tender. Positive tenderness to the medial joint line. Positive tenderness to the lateral joint line. Positive Crepitus. Good strength to hamstrings, quadriceps, dorsiflexors, and plantar flexors.  Knee Extensor Mechanism intact          Large Joint Arthrocentesis: R knee  Date/Time: 2/10/2023 10:35 AM  Consent given by: patient  Site marked: site marked  Timeout: Immediately prior to procedure a time out was called to verify the correct patient, procedure, equipment, support staff and site/side marked as required   Supporting Documentation  Indications: pain   Procedure Details  Location: knee - R knee  Needle gauge: 21G.  Medications administered: 48 mg hylan 48 MG/6ML  Patient tolerance: patient tolerated the procedure well with no immediate complications    Large Joint Arthrocentesis: L knee  Date/Time: 2/10/2023 10:37 AM  Consent given by: patient  Site marked: site marked  Timeout: Immediately prior to procedure a time out was called to verify the correct patient, procedure, equipment, support staff and site/side marked as required   Supporting Documentation  Indications: pain   Procedure Details  Location: knee - L knee  Needle gauge: 21G.  Medications administered: 48 mg hylan 48 MG/6ML  Patient tolerance: patient tolerated the procedure well with no immediate complications            Imaging Results (Most Recent)     None           Result Review :       Assessment and Plan     Diagnoses  and all orders for this visit:    1. Bilateral primary osteoarthritis of knee (Primary)        Discussed the treatment plan with the patient.  Discussed the treatment options with the patient, operative vs non-operative. The patient expressed understanding and wished to proceed with bilateral Synvisc injections.  She tolerated the injection well.     Call or return if worsening symptoms.    Follow Up     PRN      Patient was given instructions and counseling regarding her condition or for health maintenance advice. Please see specific information pulled into the AVS if appropriate.     Scribed for Shvi Mckeon MD by Analia Basurto MA.  02/10/23   09:58 EST    I have personally performed the services described in this document as scribed by the above individual and it is both accurate and complete. Shiv Mckeon MD 02/10/23

## 2023-02-21 DIAGNOSIS — R06.83 SNORING: ICD-10-CM

## 2023-02-21 DIAGNOSIS — G47.34 SLEEP RELATED HYPOXIA: ICD-10-CM

## 2023-02-21 DIAGNOSIS — G47.33 OSA (OBSTRUCTIVE SLEEP APNEA): Primary | ICD-10-CM

## 2023-02-23 ENCOUNTER — OFFICE VISIT (OUTPATIENT)
Dept: FAMILY MEDICINE CLINIC | Facility: CLINIC | Age: 73
End: 2023-02-23
Payer: MEDICARE

## 2023-02-23 VITALS
DIASTOLIC BLOOD PRESSURE: 84 MMHG | BODY MASS INDEX: 38.93 KG/M2 | OXYGEN SATURATION: 90 % | TEMPERATURE: 97.3 F | WEIGHT: 228 LBS | HEART RATE: 88 BPM | HEIGHT: 64 IN | SYSTOLIC BLOOD PRESSURE: 138 MMHG

## 2023-02-23 DIAGNOSIS — L03.032 CELLULITIS OF TOE OF LEFT FOOT: ICD-10-CM

## 2023-02-23 DIAGNOSIS — Z09 ENCOUNTER FOR EXAMINATION FOLLOWING TREATMENT AT HOSPITAL: Primary | ICD-10-CM

## 2023-02-23 PROCEDURE — 99213 OFFICE O/P EST LOW 20 MIN: CPT | Performed by: NURSE PRACTITIONER

## 2023-02-23 NOTE — PROGRESS NOTES
Chief Complaint  Follow-up (Follow up from [facility]. For her Left Third Toe is infected. )    Subjective            Suzanne Gutierrez presents to [facility]  History of Present Illness  Pt is here for the ER dept [facility] visit 23--and they did labs and then pt was given IV antibiotics and then DC to home to F/U here in approx 53-5 days and then placed on the clindamycin 300 mg TID and pt still has 5 days left--pt reports in the beginning the third toe left foot red and little swelled and tender and then the redness was up the foot--and then no fever no chills --and now reports is much better and only a little redness around the toe --top half of the toe       PHQ-2 Total Score:    PHQ-9 Total Score:      Past Medical History:   Diagnosis Date   • Diverticulitis    • Insomnia    • Oxygen dependent     wears 02 at hs only   • Pre-diabetes    • Pulmonary fibrosis (HCC)    • Seizures (HCC)    • Sleep apnea    • Vertigo    • Vertigo        Allergies   Allergen Reactions   • Cephalexin Itching   • Excedrin Migraine [Asa-Apap-Caff Buffered] GI Intolerance   • Gabapentin Unknown - High Severity   • Naproxen Unknown - High Severity        Past Surgical History:   Procedure Laterality Date   • APPENDECTOMY     • CHOLECYSTECTOMY     • COLONOSCOPY N/A 2022    Procedure: COLONOSCOPY WITH POLYPECTOMIES, BX;  Surgeon: Nicko Aranda MD;  Location: [facility] ENDOSCOPY;  Service: Gastroenterology;  Laterality: N/A;  COLON POLYPS, DIVERTICULOSIS, HEMORRHOIDS, RECTAL ULCER   • HYSTERECTOMY     • TONSILLECTOMY     • TUBAL ABDOMINAL LIGATION          Social History     Tobacco Use   • Smoking status: Former     Packs/day: 1.00     Years: 30.00     Pack years: 30.00     Types: Cigarettes     Quit date: 2004     Years since quittin.6   • Smokeless tobacco: Never   Vaping Use   • Vaping Use: Never used   Substance Use Topics   • Alcohol use: Never   • Drug use: Never        Family History   Problem Relation Age of Onset   • Alcohol abuse Father    • Prostate cancer Brother    • Breast cancer Sister    • Breast cancer Maternal Aunt    • Colon cancer Neg Hx    • Ovarian cancer Neg Hx    • Uterine cancer Neg Hx         Health Maintenance Due   Topic Date Due   • ZOSTER VACCINE (2 of 3) 08/28/2019   • DXA SCAN  10/23/2022        Current Outpatient Medications on File Prior to Visit   Medication Sig   • albuterol sulfate  (90 Base) MCG/ACT inhaler Inhale 2 puffs Every 6 (Six) Hours As Needed for Wheezing or Shortness of Air.   • amitriptyline (ELAVIL) 100 MG tablet Take 1 tablet by mouth Every Night.   • aspirin 81 MG chewable tablet aspirin 81 mg oral tablet,chewable chew 1 tablet (81 mg) by oral route once daily   Active   • azithromycin (Zithromax Z-Mark) 250 MG tablet Take 2 tablets by mouth on day 1, then 1 tablet daily on days 2-5   • Esbriet 267 MG capsule    • fluconazole (Diflucan) 150 MG tablet Take on tablet today, then repeat in 3 days   • Fluticasone-Umeclidin-Vilant (Trelegy Ellipta) 100-62.5-25 MCG/INH inhaler Inhale 1 puff Daily.   • ipratropium-albuterol (DUO-NEB) 0.5-2.5 mg/3 ml nebulizer Take 3 mL by nebulization 3 (Three) Times a Day As Needed for Wheezing or Shortness of Air.   • levETIRAcetam (KEPPRA) 500 MG tablet Take 1 tablet by mouth 2 (Two) Times a Day.   • meclizine (ANTIVERT) 25 MG tablet Take 1 tablet by mouth 3 (Three) Times a Day As Needed for Dizziness.   • pregabalin (LYRICA) 100 MG capsule Take 100 mg by mouth 2 (Two) Times a Day.   • vitamin D3 125 MCG (5000 UT) capsule capsule cholecalciferol (vitamin D3) 125 mcg (5,000 unit) oral capsule take 1 capsule by oral route daily   Active     No current facility-administered medications on file prior to visit.       Immunization History   Administered Date(s) Administered   • COVID-19 (MODERNA) 1st, 2nd, 3rd Dose Only 12/15/2021   • COVID-19 (MODERNA) BOOSTER 12/15/2021   • COVID-19 (PFIZER)  "BIVALENT BOOSTER 12+YRS 11/03/2022   • COVID-19 (PFIZER) PURPLE CAP 05/07/2021, 06/01/2021   • Fluad Quad 65+ 11/03/2022   • Fluzone High-Dose 65+yrs 10/02/2021   • Influenza Quad Vaccine (Inpatient) 09/12/2014   • PPD Test 07/15/2021   • Pneumococcal Conjugate 13-Valent (PCV13) 06/19/2017   • Pneumococcal Polysaccharide (PPSV23) 07/03/2019   • Tdap 04/06/2022   • Zostavax 06/19/2017, 07/03/2019       Review of Systems   Constitutional: Negative for chills and fever.   Respiratory: Negative for cough and wheezing.    Cardiovascular: Negative for leg swelling.   Gastrointestinal: Negative for diarrhea, nausea and vomiting.   Skin: Positive for rash.        As per HPI   Neurological: Negative for dizziness and light-headedness.        Objective     /84 (BP Location: Right arm, Patient Position: Sitting, Cuff Size: Adult)   Pulse 88   Temp 97.3 °F (36.3 °C) (Temporal)   Ht 162.6 cm (64\")   Wt 103 kg (228 lb)   SpO2 90%   BMI 39.14 kg/m²       Physical Exam  Vitals and nursing note reviewed.   Constitutional:       Appearance: Normal appearance.   HENT:      Head: Normocephalic.      Nose: Nose normal.   Eyes:      Pupils: Pupils are equal, round, and reactive to light.   Cardiovascular:      Rate and Rhythm: Normal rate.   Pulmonary:      Effort: Pulmonary effort is normal.   Abdominal:      Palpations: Abdomen is soft.   Musculoskeletal:         General: Normal range of motion.      Cervical back: Normal range of motion.        Feet:    Skin:     General: Skin is warm and dry.   Neurological:      Mental Status: She is alert and oriented to person, place, and time.   Psychiatric:         Mood and Affect: Mood normal.         Behavior: Behavior normal.         Thought Content: Thought content normal.         Judgment: Judgment normal.         Result Review :           See scanned in reports from the ER dept 2/18/23                Assessment and Plan      Diagnoses and all orders for this visit:    1. " Encounter for examination following treatment at hospital (Primary)    2. Cellulitis of toe of left foot  -     mupirocin (BACTROBAN) 2 % ointment; Apply 1 application topically to the appropriate area as directed 3 (Three) Times a Day.  Dispense: 22 g; Refill: 0    complete the clindamycin and pt reports approx 50% improved--and if by the end of the course of antibiotics not resolved completely she will me know and we will proceed with referral to the podiatry--and then in the meantime pt can use the bactroban topical as well after either her shower or an epsom salt soak and then pap dry and then apply with a Qtip         Follow Up     Return if symptoms worsen or fail to improve.

## 2023-02-24 RX ORDER — ALBUTEROL SULFATE 90 UG/1
AEROSOL, METERED RESPIRATORY (INHALATION)
Qty: 8.5 G | Refills: 0 | Status: SHIPPED | OUTPATIENT
Start: 2023-02-24

## 2023-02-28 ENCOUNTER — PATIENT MESSAGE (OUTPATIENT)
Dept: FAMILY MEDICINE CLINIC | Facility: CLINIC | Age: 73
End: 2023-02-28
Payer: MEDICARE

## 2023-02-28 DIAGNOSIS — L03.032 CELLULITIS OF TOE OF LEFT FOOT: Primary | ICD-10-CM

## 2023-02-28 NOTE — TELEPHONE ENCOUNTER
From: Suzanne Gutierrez  To: Shelley Poole  Sent: 2/28/2023 8:38 AM EST  Subject: Follow up on left foot third toe    I was wanting to go ahead and get a referral for [facility] foot and ankle specialist in [place]. Please! I feel like this toe isn’t getting any better.   Thank you Suzanne

## 2023-03-20 DIAGNOSIS — G47.00 INSOMNIA, UNSPECIFIED TYPE: ICD-10-CM

## 2023-03-20 RX ORDER — AMITRIPTYLINE HYDROCHLORIDE 100 MG/1
TABLET, FILM COATED ORAL
Qty: 90 TABLET | Refills: 3 | OUTPATIENT
Start: 2023-03-20

## 2023-04-05 DIAGNOSIS — R42 VERTIGO: ICD-10-CM

## 2023-04-05 RX ORDER — MECLIZINE HYDROCHLORIDE 25 MG/1
TABLET ORAL
Qty: 90 TABLET | Refills: 2 | Status: SHIPPED | OUTPATIENT
Start: 2023-04-05

## 2023-04-12 ENCOUNTER — TRANSCRIBE ORDERS (OUTPATIENT)
Dept: ADMINISTRATIVE | Facility: HOSPITAL | Age: 73
End: 2023-04-12
Payer: MEDICARE

## 2023-04-12 DIAGNOSIS — R93.89 ABNORMAL CHEST CT: Primary | ICD-10-CM

## 2023-04-12 DIAGNOSIS — R91.1 PULMONARY NODULE: ICD-10-CM

## 2023-05-09 RX ORDER — ALBUTEROL SULFATE 90 UG/1
AEROSOL, METERED RESPIRATORY (INHALATION)
Qty: 8.5 G | Refills: 0 | Status: SHIPPED | OUTPATIENT
Start: 2023-05-09

## 2023-05-15 RX ORDER — LEVETIRACETAM 500 MG/1
TABLET ORAL
Qty: 60 TABLET | OUTPATIENT
Start: 2023-05-15

## 2023-05-24 ENCOUNTER — TELEPHONE (OUTPATIENT)
Dept: FAMILY MEDICINE CLINIC | Facility: CLINIC | Age: 73
End: 2023-05-24

## 2023-05-24 DIAGNOSIS — G47.00 INSOMNIA, UNSPECIFIED TYPE: ICD-10-CM

## 2023-05-24 DIAGNOSIS — J44.9 COPD MIXED TYPE: ICD-10-CM

## 2023-05-24 RX ORDER — AMITRIPTYLINE HYDROCHLORIDE 100 MG/1
100 TABLET ORAL NIGHTLY
Qty: 90 TABLET | Refills: 0 | Status: SHIPPED | OUTPATIENT
Start: 2023-05-24 | End: 2023-08-30 | Stop reason: SDUPTHER

## 2023-05-24 RX ORDER — IPRATROPIUM BROMIDE AND ALBUTEROL SULFATE 2.5; .5 MG/3ML; MG/3ML
3 SOLUTION RESPIRATORY (INHALATION) 3 TIMES DAILY PRN
Qty: 180 ML | Refills: 0 | Status: SHIPPED | OUTPATIENT
Start: 2023-05-24 | End: 2023-08-02

## 2023-05-24 NOTE — TELEPHONE ENCOUNTER
Caller: [facility] - [place], [place] - [address] [place] [address] - [phone] [facility] [phone] FX    Relationship: Pharmacy    Best call back number: [phone]    Requested Prescriptions:       ipratropium-albuterol (DUO-NEB) 0.5-2.5 mg/3 ml nebulizer       amitriptyline (ELAVIL) 100 MG tablet    Pharmacy where request should be sent: [facility] - [place], [place] - [address] [place], [place] [phone] [facility] [phone] FX     Last office visit with prescribing clinician: 2/23/2023   Last telemedicine visit with prescribing clinician: Visit date not found   Next office visit with prescribing clinician: Visit date not found     Additional details provided by patient: PHARMACY CALLED AND SAID PATIENT WANTS THESE PRESCRIPTIONS THROUGH MAIL ORDER      Richmond Gimenez Rep   05/24/23 10:30 EDT

## 2023-05-25 ENCOUNTER — OFFICE VISIT (OUTPATIENT)
Dept: ORTHOPEDIC SURGERY | Facility: CLINIC | Age: 73
End: 2023-05-25
Payer: MEDICARE

## 2023-05-25 VITALS — HEART RATE: 103 BPM | HEIGHT: 64 IN | BODY MASS INDEX: 38.93 KG/M2 | WEIGHT: 228 LBS | OXYGEN SATURATION: 90 %

## 2023-05-25 DIAGNOSIS — M17.0 BILATERAL PRIMARY OSTEOARTHRITIS OF KNEE: Primary | ICD-10-CM

## 2023-05-25 RX ORDER — TRIAMCINOLONE ACETONIDE 40 MG/ML
40 INJECTION, SUSPENSION INTRA-ARTICULAR; INTRAMUSCULAR
Status: COMPLETED | OUTPATIENT
Start: 2023-05-25 | End: 2023-05-25

## 2023-05-25 RX ORDER — LIDOCAINE HYDROCHLORIDE 10 MG/ML
5 INJECTION, SOLUTION INFILTRATION; PERINEURAL
Status: COMPLETED | OUTPATIENT
Start: 2023-05-25 | End: 2023-05-25

## 2023-05-25 RX ADMIN — TRIAMCINOLONE ACETONIDE 40 MG: 40 INJECTION, SUSPENSION INTRA-ARTICULAR; INTRAMUSCULAR at 13:13

## 2023-05-25 RX ADMIN — LIDOCAINE HYDROCHLORIDE 5 ML: 10 INJECTION, SOLUTION INFILTRATION; PERINEURAL at 13:13

## 2023-05-25 NOTE — TELEPHONE ENCOUNTER
Caller: [facility] - [place], [place] - [address] [facility] [phone] [facility] [phone] FX    Relationship to patient: Pharmacy    Best call back number: [phone]    Patient is needing: PATIENT CALLED IN TO RETURN CALL FROM [place]. HUB TO READ WAS READ TO PATIENT VERBATIM. FOLLOW UP WAS SCHEDULED FOR July 3,2023.    PATIENT DID NOT HAVE ANY FURTHER QUESTIONS.

## 2023-05-25 NOTE — TELEPHONE ENCOUNTER
UNABLE TO REACH BOTH NUMBERS ON PT'S CHART/UNABLE TO LM. PT DUE FOR FOLLOW UP WITH LEONARD IN July. HUB TO READ

## 2023-05-25 NOTE — PROGRESS NOTES
"Chief Complaint  Follow-up and Pain of the Left Knee and Follow-up and Pain of the Right Knee     Subjective      Suzanne Gutierrez presents to [facility] for follow up of bilateral knees. She has had osteo arthritis for years.  She had Synvisc injections on 2/10/23 and stated they didn't give much relief.  She is here today for bilateral knee steroid injections. She has increase difficulty with prolonged standing and ambulation. She has had no new injury or fall.  She has cellulitis of the left toes.  She is on anti biotic and being covered by Dr Caceres for that issue.      Allergies   Allergen Reactions   • Cephalexin Itching   • Excedrin Migraine [Asa-Apap-Caff Buffered] GI Intolerance   • Gabapentin Unknown - High Severity   • Naproxen Unknown - High Severity        Social History     Socioeconomic History   • Marital status:    Tobacco Use   • Smoking status: Former     Packs/day: 1.00     Years: 30.00     Pack years: 30.00     Types: Cigarettes     Quit date: 2004     Years since quittin.8   • Smokeless tobacco: Never   Vaping Use   • Vaping Use: Never used   Substance and Sexual Activity   • Alcohol use: Never   • Drug use: Never   • Sexual activity: Not Currently        Review of Systems     Objective   Vital Signs:   Pulse 103   Ht 162.6 cm (64\")   Wt 103 kg (228 lb)   SpO2 90%   BMI 39.14 kg/m²       Physical Exam  Constitutional:       Appearance: Normal appearance. Patient is well-developed and normal weight.   HENT:      Head: Normocephalic.      Right Ear: Hearing and external ear normal.      Left Ear: Hearing and external ear normal.      Nose: Nose normal.   Eyes:      Conjunctiva/sclera: Conjunctivae normal.   Cardiovascular:      Rate and Rhythm: Normal rate.   Pulmonary:      Effort: Pulmonary effort is normal.      Breath sounds: No wheezing or rales.   Abdominal:      Palpations: Abdomen is soft.      Tenderness: There is no abdominal tenderness. "   Musculoskeletal:      Cervical back: Normal range of motion.   Skin:     Findings: No rash.   Neurological:      Mental Status: Patient is alert and oriented to person, place, and time.   Psychiatric:         Mood and Affect: Mood and affect normal.         Judgment: Judgment normal.       Ortho Exam      BILATERAL KNEES Flexion 110 RIGHT 90 LEFT. Extension -3. RIGHT -5 LEFT Stable to varus/valgus stress. Stable to anterior/posterior drawer. Neurovascularly intact. Negative Bruce. Negative Lachman. Positive EHL, FHL, HS and TA. Sensation intact to light touch all 5 nerves of the foot. Ambulates with Antalgic gait. Patella is well tracking. Calf supple, non-tender. Positive tenderness to the medial joint line. Positive tenderness to the lateral joint line. Positive Crepitus. Good strength to hamstrings, quadriceps, dorsiflexors, and plantar flexors.  Knee Extensor Mechanism intact        Large Joint RIGHT KNEE: R knee  Date/Time: 5/25/2023 1:13 PM  Consent given by: patient  Site marked: site marked  Timeout: Immediately prior to procedure a time out was called to verify the correct patient, procedure, equipment, support staff and site/side marked as required   Supporting Documentation  Indications: pain   Procedure Details  Location: knee - R knee  Preparation: Patient was prepped and draped in the usual sterile fashion  Needle gauge: 21G.  Medications administered: 5 mL lidocaine 1 %; 40 mg triamcinolone acetonide 40 MG/ML  Patient tolerance: patient tolerated the procedure well with no immediate complications    Large Joint LEFT KNEE: L knee  Date/Time: 5/25/2023 1:13 PM  Consent given by: patient  Site marked: site marked  Timeout: Immediately prior to procedure a time out was called to verify the correct patient, procedure, equipment, support staff and site/side marked as required   Supporting Documentation  Indications: pain   Procedure Details  Location: knee - L knee  Preparation: Patient was prepped and  draped in the usual sterile fashion  Needle gauge: 21G.  Medications administered: 5 mL lidocaine 1 %; 40 mg triamcinolone acetonide 40 MG/ML  Patient tolerance: patient tolerated the procedure well with no immediate complications            Imaging Results (Most Recent)     None           Result Review :         Assessment and Plan     Diagnoses and all orders for this visit:    1. Bilateral primary osteoarthritis of knee (Primary)        Discussed the treatment plan with the patient. Discussed the risks and benefits of conservative measures.  The patient expressed understanding and wished to proceed with bilateral knee steroid injections.  She tolerated the injections well.     Will Xray bilateral knees at the next visit.     Call or return if worsening symptoms.    Follow Up     PRN X ray bilateral knees at next visit to assess arthritis of the knees.       Patient was given instructions and counseling regarding her condition or for health maintenance advice. Please see specific information pulled into the AVS if appropriate.     Scribed for Shiv Mckeon MD by Analia Basurto MA.  05/25/23   12:55 EDT    I have personally performed the services described in this document as scribed by the above individual and it is both accurate and complete. Shiv Mckeon MD 05/25/23

## 2023-06-07 DIAGNOSIS — R42 VERTIGO: ICD-10-CM

## 2023-06-07 RX ORDER — MECLIZINE HYDROCHLORIDE 25 MG/1
25 TABLET ORAL 3 TIMES DAILY PRN
Qty: 90 TABLET | Refills: 2 | Status: SHIPPED | OUTPATIENT
Start: 2023-06-07

## 2023-07-24 ENCOUNTER — TELEPHONE (OUTPATIENT)
Dept: FAMILY MEDICINE CLINIC | Facility: CLINIC | Age: 73
End: 2023-07-24
Payer: MEDICARE

## 2023-07-24 NOTE — TELEPHONE ENCOUNTER
Caller: Suzanne Gutierrez    Relationship: Self    Best call back number: [phone]    What test was performed: LABS    When was the test performed: 07/19/23    Where was the test performed: IN OFFICE    Additional notes: THE PATIENT STATED SHE HAD LABS COMPLETED IN OFFICE THAT NEEDS TO BE FAXED TO [facility]. SHE WOULD LIKE A CALL BACK TO CONFIRM    FAX: [phone]

## 2023-08-02 DIAGNOSIS — J44.9 COPD MIXED TYPE: ICD-10-CM

## 2023-08-02 RX ORDER — IPRATROPIUM BROMIDE AND ALBUTEROL SULFATE 2.5; .5 MG/3ML; MG/3ML
SOLUTION RESPIRATORY (INHALATION)
Qty: 180 ML | Refills: 2 | Status: SHIPPED | OUTPATIENT
Start: 2023-08-02

## 2023-08-03 DIAGNOSIS — E66.01 CLASS 2 SEVERE OBESITY DUE TO EXCESS CALORIES WITH SERIOUS COMORBIDITY AND BODY MASS INDEX (BMI) OF 39.0 TO 39.9 IN ADULT: ICD-10-CM

## 2023-08-03 RX ORDER — SEMAGLUTIDE 0.25 MG/.5ML
0.25 INJECTION, SOLUTION SUBCUTANEOUS WEEKLY
Qty: 2 ML | Refills: 0 | Status: SHIPPED | OUTPATIENT
Start: 2023-08-03

## 2023-08-09 DIAGNOSIS — R42 VERTIGO: ICD-10-CM

## 2023-08-09 RX ORDER — MECLIZINE HYDROCHLORIDE 25 MG/1
TABLET ORAL
Qty: 90 TABLET | Refills: 11 | OUTPATIENT
Start: 2023-08-09

## 2023-08-10 DIAGNOSIS — G47.00 INSOMNIA, UNSPECIFIED TYPE: ICD-10-CM

## 2023-08-10 RX ORDER — AMITRIPTYLINE HYDROCHLORIDE 100 MG/1
100 TABLET ORAL NIGHTLY
Qty: 90 TABLET | Refills: 0 | OUTPATIENT
Start: 2023-08-10

## 2023-08-18 ENCOUNTER — HOSPITAL ENCOUNTER (OUTPATIENT)
Dept: CT IMAGING | Facility: HOSPITAL | Age: 73
Discharge: HOME | End: 2023-08-18
Admitting: INTERNAL MEDICINE
Payer: MEDICARE

## 2023-08-18 DIAGNOSIS — R93.89 ABNORMAL CHEST CT: ICD-10-CM

## 2023-08-18 DIAGNOSIS — R91.1 PULMONARY NODULE: ICD-10-CM

## 2023-08-18 PROCEDURE — 71250 CT THORAX DX C-: CPT

## 2023-08-29 ENCOUNTER — OFFICE VISIT (OUTPATIENT)
Dept: ORTHOPEDIC SURGERY | Facility: CLINIC | Age: 73
End: 2023-08-29
Payer: MEDICARE

## 2023-08-29 VITALS
DIASTOLIC BLOOD PRESSURE: 69 MMHG | HEIGHT: 64 IN | OXYGEN SATURATION: 90 % | SYSTOLIC BLOOD PRESSURE: 107 MMHG | WEIGHT: 230 LBS | HEART RATE: 90 BPM | BODY MASS INDEX: 39.27 KG/M2

## 2023-08-29 DIAGNOSIS — M17.0 OSTEOARTHRITIS OF BOTH KNEES, UNSPECIFIED OSTEOARTHRITIS TYPE: ICD-10-CM

## 2023-08-29 DIAGNOSIS — M25.561 RIGHT KNEE PAIN, UNSPECIFIED CHRONICITY: Primary | ICD-10-CM

## 2023-08-29 DIAGNOSIS — M25.562 LEFT KNEE PAIN, UNSPECIFIED CHRONICITY: ICD-10-CM

## 2023-08-29 RX ADMIN — BUPIVACAINE HYDROCHLORIDE 5 ML: 5 INJECTION, SOLUTION EPIDURAL; INTRACAUDAL at 14:35

## 2023-08-29 RX ADMIN — TRIAMCINOLONE ACETONIDE 40 MG: 40 INJECTION, SUSPENSION INTRA-ARTICULAR; INTRAMUSCULAR at 14:35

## 2023-08-29 NOTE — PROGRESS NOTES
"Chief Complaint  Follow-up and Pain of the Left Knee and Follow-up and Pain of the Right Knee     Subjective      Suzanne Gutierrez presents to [facility] for follow up of bilateral knees.  She states the left one is worse then the right.  She has had injections in the past that have given some relief.  She has difficulty with prolonged standing and ambulation.     Allergies   Allergen Reactions    Cephalexin Itching    Excedrin Migraine [Asa-Apap-Caff Buffered] GI Intolerance    Gabapentin Unknown - High Severity    Naproxen Unknown - High Severity        Social History     Socioeconomic History    Marital status:    Tobacco Use    Smoking status: Former     Packs/day: 1.00     Years: 30.00     Pack years: 30.00     Types: Cigarettes     Quit date: 2004     Years since quittin.1    Smokeless tobacco: Never   Vaping Use    Vaping Use: Never used   Substance and Sexual Activity    Alcohol use: Never    Drug use: Never    Sexual activity: Not Currently        I reviewed the patient's chief complaint, history of present illness, review of systems, past medical history, surgical history, family history, social history, medications, and allergy list.     Review of Systems     Constitutional: Denies fevers, chills, weight loss  Cardiovascular: Denies chest pain, shortness of breath  Skin: Denies rashes, acute skin changes  Neurologic: Denies headache, loss of consciousness        Vital Signs:   /69   Pulse 90   Ht 162.6 cm (64\")   Wt 104 kg (230 lb)   SpO2 90%   BMI 39.48 kg/mý          Physical Exam  General: Alert. No acute distress    Ortho Exam        BILATERAL KNEES Flexion 110. Extension -3. Stable to varus/valgus stress. Stable to anterior/posterior drawer. Neurovascularly intact. Negative Bruce. Negative Lachman. Positive EHL, FHL, HS and TA. Sensation intact to light touch all 5 nerves of the foot. Ambulates with Antalgic gait. Patella is well tracking. " Calf supple, non-tender. Positive tenderness to the medial joint line. Positive tenderness to the lateral joint line. Positive Crepitus. Good strength to hamstrings, quadriceps, dorsiflexors, and plantar flexors.  Knee Extensor Mechanism intact      Large Joint Arthrocentesis: R knee  Date/Time: 8/29/2023 2:35 PM  Consent given by: patient  Site marked: site marked  Timeout: Immediately prior to procedure a time out was called to verify the correct patient, procedure, equipment, support staff and site/side marked as required   Supporting Documentation  Indications: pain   Procedure Details  Location: knee - R knee  Needle gauge: 21g.  Medications administered: 5 mL bupivacaine (PF) 0.5 %; 40 mg triamcinolone acetonide 40 MG/ML  Patient tolerance: patient tolerated the procedure well with no immediate complications      Large Joint Arthrocentesis: L knee  Date/Time: 8/29/2023 2:35 PM  Consent given by: patient  Site marked: site marked  Timeout: Immediately prior to procedure a time out was called to verify the correct patient, procedure, equipment, support staff and site/side marked as required   Supporting Documentation  Indications: pain   Procedure Details  Location: knee - L knee  Needle gauge: 21g.  Medications administered: 40 mg triamcinolone acetonide 40 MG/ML; 5 mL bupivacaine (PF) 0.5 %  Patient tolerance: patient tolerated the procedure well with no immediate complications        Imaging Results (Most Recent)       Procedure Component Value Units Date/Time    XR Knee 3 View Right [[id number]] Resulted: 08/29/23 1103     Updated: 08/29/23 1105    XR Knee 3 View Left [[id number]] Resulted: 08/29/23 1103     Updated: 08/29/23 1105             Result Review :     X-Ray Report:  Right knee X-Ray  Indication: Evaluation of the right knee.   AP/Lateral and [place] view(s)  Findings: \Moderate to severe  Prior studies available for comparison: yes     X-Ray Report:  Left knee X-Ray  Indication: Evaluation of the  left knee.   AP/Lateral and [place] view(s)  Findings: Moderate arthritis.   Prior studies available for comparison: yes       CT Chest Without Contrast Diagnostic    Result Date: 8/18/2023  Narrative: PROCEDURE: CT CHEST WO CONTRAST DIAGNOSTIC  COMPARISON: [facility], CT, CHEST W/O CONTRAST, 4/21/2017, 8:59.  [facility], CT, CT CHEST W CONTRAST DIAGNOSTIC, 8/12/2021, 13:04.  [facility], CT, CT CHEST W CONTRAST DIAGNOSTIC, 2/17/2022, 12:50.  INDICATIONS: R93.89 PULMONARY NODULE  TECHNIQUE: CT images were created without the administration of contrast material.   PROTOCOL:   Standard imaging protocol performed    RADIATION:   DLP: 781mGy*cm   Automated exposure control was utilized to minimize radiation dose.  FINDINGS:  There is advanced emphysematous lung disease with interstitial fibrosis.  The ground-glass densities apparent on the patient's previous chest CT are much less apparent on the current study.  No acute infiltrates are identified.  The nodular scarring described on prior chest CTs dating back to 2021 has not changed significantly.  There are no new or enlarging pulmonary nodules or masses in the lungs.  The enlarged hilar node is difficult to appreciate on this noncontrast scan.  There are no significant coronary artery calcifications.  There is no pleural or pericardial fluid.  There is a small hiatal hernia.  The gallbladder is apparently absent.  There is an incidental left renal cyst.      Impression:   1. Advanced emphysematous lung disease with interstitial fibrosis. 2. Nodular density identified on previous chest CT scans likely represents chronic scarring.  It has remained stable dating back to previous chest of 2017. It should be considered benign.  3. The enlarged right hilar node described on prior studies is not appreciated on the current study.     Rafy Maloney MD       Electronically Signed and Approved By: Rafy Maloney MD on  8/18/2023 at 9:56                     Assessment and Plan     Diagnoses and all orders for this visit:    1. Right knee pain, unspecified chronicity (Primary)  -     XR Knee 3 View Right    2. Left knee pain, unspecified chronicity  -     XR Knee 3 View Left    3. Osteoarthritis of both knees, unspecified osteoarthritis type        Discussed the treatment plan with the patient. I reviewed the X-rays that were obtained today with the patient.     Discussed the risks and benefits of conservative measures.  The patient expressed understanding and wished to proceed with bilateral knee steroid injections.  She tolerated the injections well.       Call or return if worsening symptoms.    Follow Up     PRN      Patient was given instructions and counseling regarding her condition or for health maintenance advice. Please see specific information pulled into the AVS if appropriate.     Scribed for Shiv Mckeon MD by Analia Basurto MA.  08/29/23   11:13 EDT    I have personally performed the services described in this document as scribed by the above individual and it is both accurate and complete. Shiv Mckeon MD 08/30/23

## 2023-08-30 ENCOUNTER — OFFICE VISIT (OUTPATIENT)
Dept: FAMILY MEDICINE CLINIC | Facility: CLINIC | Age: 73
End: 2023-08-30
Payer: MEDICARE

## 2023-08-30 ENCOUNTER — TRANSCRIBE ORDERS (OUTPATIENT)
Dept: FAMILY MEDICINE CLINIC | Facility: CLINIC | Age: 73
End: 2023-08-30
Payer: MEDICARE

## 2023-08-30 VITALS
TEMPERATURE: 96.6 F | BODY MASS INDEX: 39.78 KG/M2 | HEART RATE: 106 BPM | DIASTOLIC BLOOD PRESSURE: 72 MMHG | SYSTOLIC BLOOD PRESSURE: 120 MMHG | HEIGHT: 64 IN | WEIGHT: 233 LBS | OXYGEN SATURATION: 92 %

## 2023-08-30 DIAGNOSIS — M19.072 OSTEOARTHRITIS OF LEFT ANKLE, UNSPECIFIED OSTEOARTHRITIS TYPE: ICD-10-CM

## 2023-08-30 DIAGNOSIS — Z12.31 SCREENING MAMMOGRAM FOR BREAST CANCER: ICD-10-CM

## 2023-08-30 DIAGNOSIS — R42 VERTIGO: ICD-10-CM

## 2023-08-30 DIAGNOSIS — N39.41 URGE INCONTINENCE OF URINE: ICD-10-CM

## 2023-08-30 DIAGNOSIS — M19.072 OSTEOARTHRITIS OF LEFT ANKLE, UNSPECIFIED OSTEOARTHRITIS TYPE: Primary | ICD-10-CM

## 2023-08-30 DIAGNOSIS — M20.40 HAMMER TOE, UNSPECIFIED LATERALITY: ICD-10-CM

## 2023-08-30 DIAGNOSIS — Z71.85 IMMUNIZATION COUNSELING: ICD-10-CM

## 2023-08-30 DIAGNOSIS — Z00.00 MEDICARE ANNUAL WELLNESS VISIT, SUBSEQUENT: Primary | ICD-10-CM

## 2023-08-30 DIAGNOSIS — Z78.0 POSTMENOPAUSAL: ICD-10-CM

## 2023-08-30 DIAGNOSIS — E66.01 CLASS 2 SEVERE OBESITY DUE TO EXCESS CALORIES WITH SERIOUS COMORBIDITY AND BODY MASS INDEX (BMI) OF 39.0 TO 39.9 IN ADULT: ICD-10-CM

## 2023-08-30 DIAGNOSIS — G47.00 INSOMNIA, UNSPECIFIED TYPE: ICD-10-CM

## 2023-08-30 DIAGNOSIS — S91.302D OPEN WOUND OF LEFT FOOT, SUBSEQUENT ENCOUNTER: ICD-10-CM

## 2023-08-30 DIAGNOSIS — B37.2 YEAST DERMATITIS: ICD-10-CM

## 2023-08-30 LAB
ALBUMIN SERPL-MCNC: 4.3 G/DL (ref 3.5–5.2)
ALBUMIN/GLOB SERPL: 1.3 G/DL
ALP SERPL-CCNC: 87 U/L (ref 39–117)
ALT SERPL W P-5'-P-CCNC: 16 U/L (ref 1–33)
ANION GAP SERPL CALCULATED.3IONS-SCNC: 13.3 MMOL/L (ref 5–15)
AST SERPL-CCNC: 18 U/L (ref 1–32)
BASOPHILS # BLD AUTO: 0.02 10*3/MM3 (ref 0–0.2)
BASOPHILS NFR BLD AUTO: 0.2 % (ref 0–1.5)
BILIRUB SERPL-MCNC: <0.2 MG/DL (ref 0–1.2)
BILIRUB UR QL STRIP: NEGATIVE
BUN SERPL-MCNC: 12 MG/DL (ref 8–23)
BUN/CREAT SERPL: 14.5 (ref 7–25)
CALCIUM SPEC-SCNC: 10 MG/DL (ref 8.6–10.5)
CHLORIDE SERPL-SCNC: 101 MMOL/L (ref 98–107)
CLARITY UR: CLEAR
CO2 SERPL-SCNC: 24.7 MMOL/L (ref 22–29)
COLOR UR: YELLOW
CREAT SERPL-MCNC: 0.83 MG/DL (ref 0.57–1)
DEPRECATED RDW RBC AUTO: 41 FL (ref 37–54)
EGFRCR SERPLBLD CKD-EPI 2021: 74.5 ML/MIN/1.73
EOSINOPHIL # BLD AUTO: 0.01 10*3/MM3 (ref 0–0.4)
EOSINOPHIL NFR BLD AUTO: 0.1 % (ref 0.3–6.2)
ERYTHROCYTE [DISTWIDTH] IN BLOOD BY AUTOMATED COUNT: 12.5 % (ref 12.3–15.4)
GLOBULIN UR ELPH-MCNC: 3.4 GM/DL
GLUCOSE SERPL-MCNC: 93 MG/DL (ref 65–99)
GLUCOSE UR STRIP-MCNC: NEGATIVE MG/DL
HCT VFR BLD AUTO: 40.9 % (ref 34–46.6)
HGB BLD-MCNC: 13.8 G/DL (ref 12–15.9)
HGB UR QL STRIP.AUTO: NEGATIVE
IMM GRANULOCYTES # BLD AUTO: 0.1 10*3/MM3 (ref 0–0.05)
IMM GRANULOCYTES NFR BLD AUTO: 0.8 % (ref 0–0.5)
KETONES UR QL STRIP: NEGATIVE
LEUKOCYTE ESTERASE UR QL STRIP.AUTO: NEGATIVE
LYMPHOCYTES # BLD AUTO: 1.19 10*3/MM3 (ref 0.7–3.1)
LYMPHOCYTES NFR BLD AUTO: 9.3 % (ref 19.6–45.3)
MCH RBC QN AUTO: 30.7 PG (ref 26.6–33)
MCHC RBC AUTO-ENTMCNC: 33.7 G/DL (ref 31.5–35.7)
MCV RBC AUTO: 91.1 FL (ref 79–97)
MONOCYTES # BLD AUTO: 0.96 10*3/MM3 (ref 0.1–0.9)
MONOCYTES NFR BLD AUTO: 7.5 % (ref 5–12)
NEUTROPHILS NFR BLD AUTO: 10.5 10*3/MM3 (ref 1.7–7)
NEUTROPHILS NFR BLD AUTO: 82.1 % (ref 42.7–76)
NITRITE UR QL STRIP: NEGATIVE
NRBC BLD AUTO-RTO: 0 /100 WBC (ref 0–0.2)
PH UR STRIP.AUTO: 7 [PH] (ref 5–8)
PLATELET # BLD AUTO: 259 10*3/MM3 (ref 140–450)
PMV BLD AUTO: 11 FL (ref 6–12)
POTASSIUM SERPL-SCNC: 4.3 MMOL/L (ref 3.5–5.2)
PROT SERPL-MCNC: 7.7 G/DL (ref 6–8.5)
PROT UR QL STRIP: ABNORMAL
RBC # BLD AUTO: 4.49 10*6/MM3 (ref 3.77–5.28)
SODIUM SERPL-SCNC: 139 MMOL/L (ref 136–145)
SP GR UR STRIP: 1.02 (ref 1–1.03)
UROBILINOGEN UR QL STRIP: ABNORMAL
WBC NRBC COR # BLD: 12.78 10*3/MM3 (ref 3.4–10.8)

## 2023-08-30 PROCEDURE — 80053 COMPREHEN METABOLIC PANEL: CPT | Performed by: PODIATRIST

## 2023-08-30 PROCEDURE — 85025 COMPLETE CBC W/AUTO DIFF WBC: CPT | Performed by: PODIATRIST

## 2023-08-30 PROCEDURE — 81003 URINALYSIS AUTO W/O SCOPE: CPT | Performed by: NURSE PRACTITIONER

## 2023-08-30 RX ORDER — BUPIVACAINE HYDROCHLORIDE 5 MG/ML
5 INJECTION, SOLUTION EPIDURAL; INTRACAUDAL
Status: COMPLETED | OUTPATIENT
Start: 2023-08-29 | End: 2023-08-29

## 2023-08-30 RX ORDER — MECLIZINE HYDROCHLORIDE 25 MG/1
25 TABLET ORAL 3 TIMES DAILY PRN
Qty: 90 TABLET | Refills: 2 | Status: SHIPPED | OUTPATIENT
Start: 2023-08-30

## 2023-08-30 RX ORDER — TRIAMCINOLONE ACETONIDE 40 MG/ML
40 INJECTION, SUSPENSION INTRA-ARTICULAR; INTRAMUSCULAR
Status: COMPLETED | OUTPATIENT
Start: 2023-08-29 | End: 2023-08-29

## 2023-08-30 RX ORDER — TOLTERODINE 2 MG/1
2 CAPSULE, EXTENDED RELEASE ORAL DAILY
Qty: 90 CAPSULE | Refills: 1 | Status: SHIPPED | OUTPATIENT
Start: 2023-08-30

## 2023-08-30 RX ORDER — AMITRIPTYLINE HYDROCHLORIDE 100 MG/1
100 TABLET ORAL NIGHTLY
Qty: 90 TABLET | Refills: 1 | Status: SHIPPED | OUTPATIENT
Start: 2023-08-30

## 2023-08-30 RX ORDER — NYSTATIN 100000 U/G
1 CREAM TOPICAL 2 TIMES DAILY
Qty: 30 G | Refills: 3 | Status: SHIPPED | OUTPATIENT
Start: 2023-08-30

## 2023-08-30 RX ORDER — SEMAGLUTIDE 0.25 MG/.5ML
0.25 INJECTION, SOLUTION SUBCUTANEOUS WEEKLY
Qty: 2 ML | Refills: 0 | Status: SHIPPED | OUTPATIENT
Start: 2023-08-30

## 2023-08-30 NOTE — PROGRESS NOTES
The ABCs of the Annual Wellness Visit  Subsequent Medicare Wellness Visit    Subjective    Suzanne Gutierrez is a 73 y.o. female who presents for a Subsequent Medicare Wellness Visit.    The following portions of the patient's history were reviewed and   updated as appropriate: allergies, current medications, past family history, past medical history, past social history, past surgical history, and problem list.    Compared to one year ago, the patient feels her physical   health is the same.    Compared to one year ago, the patient feels her mental   health is the same.    Recent Hospitalizations:  She was not admitted to the hospital during the last year.       Current Medical Providers:  Patient Care Team:  Shelley Poole APRN as PCP - General (Nurse Practitioner)    Outpatient Medications Prior to Visit   Medication Sig Dispense Refill    albuterol sulfate  (90 Base) MCG/ACT inhaler INHALE 2 PUFFS BY MOUTH EVERY 6 HOURS AS NEEDED FOR WHEEZING OR SHORTNESS OF BREATH 8.5 g 0    aspirin 81 MG chewable tablet aspirin 81 mg oral tablet,chewable chew 1 tablet (81 mg) by oral route once daily   Active      Esbriet 267 MG capsule       Fluticasone-Umeclidin-Vilant (Trelegy Ellipta) 100-62.5-25 MCG/INH inhaler Inhale 1 puff Daily.      ipratropium-albuterol (DUO-NEB) 0.5-2.5 mg/3 ml nebulizer INHALE 3 ML VIA NEBULIZER THREE TIMES A DAY AS NEEDED FOR WHEEZING OR SHORTNESS OF  mL 2    levETIRAcetam (KEPPRA) 500 MG tablet Take 1 tablet by mouth 2 (Two) Times a Day. 180 tablet 3    mupirocin (BACTROBAN) 2 % ointment Apply 1 application topically to the appropriate area as directed 3 (Three) Times a Day. 22 g 0    pregabalin (LYRICA) 100 MG capsule Take 1 capsule by mouth 2 (Two) Times a Day.      vitamin D3 125 MCG (5000 UT) capsule capsule cholecalciferol (vitamin D3) 125 mcg (5,000 unit) oral capsule take 1 capsule by oral route daily   Active      amitriptyline (ELAVIL) 100 MG tablet Take 1 tablet by  "mouth Every Night. 90 tablet 0    meclizine (ANTIVERT) 25 MG tablet Take 1 tablet by mouth 3 (Three) Times a Day As Needed for Dizziness. 90 tablet 2    Semaglutide-Weight Management (Wegovy) 0.25 MG/0.5ML solution auto-injector Inject 0.25 mg under the skin into the appropriate area as directed 1 (One) Time Per Week. 2 mL 0     No facility-administered medications prior to visit.       No opioid medication identified on active medication list. I have reviewed chart for other potential  high risk medication/s and harmful drug interactions in the elderly.        Aspirin is on active medication list. Aspirin use is indicated based on review of current medical condition/s. Pros and cons of this therapy have been discussed today. Benefits of this medication outweigh potential harm.  Patient has been encouraged to continue taking this medication.  .      Patient Active Problem List   Diagnosis    Dyspnea    Hoarseness    Pain in both knees    Fibrosis of lung    Lung disease    Otalgia    Other acute sinusitis    Recurrent otitis media    Sleep apnea    Vertigo    COPD mixed type    Partial idiopathic epilepsy with seizures of localized onset, not intractable, without status epilepticus    Primary localized osteoarthrosis of right lower leg    Diverticulitis    Vitamin D deficiency    Vaginal atrophy    Vulvar lesion    Mixed hyperlipidemia    Prediabetes    Insomnia     Advance Care Planning   Advance Care Planning     Advance Directive is not on file.  ACP discussion was held with the patient during this visit. Patient does not have an advance directive, information provided.     Objective    Vitals:    08/30/23 1304   BP: 120/72   BP Location: Right arm   Patient Position: Sitting   Cuff Size: Adult   Pulse: 106   Temp: 96.6 øF (35.9 øC)   TempSrc: Temporal   SpO2: 92%   Weight: 106 kg (233 lb)   Height: 162.6 cm (64\")   PainSc: 0-No pain     Estimated body mass index is 39.99 kg/mý as calculated from the following:   " " Height as of this encounter: 162.6 cm (64\").    Weight as of this encounter: 106 kg (233 lb).           Does the patient have evidence of cognitive impairment? No          HEALTH RISK ASSESSMENT    Smoking Status:  Social History     Tobacco Use   Smoking Status Former    Packs/day: 1.00    Years: 30.00    Pack years: 30.00    Types: Cigarettes    Quit date: 2004    Years since quittin.1   Smokeless Tobacco Never     Alcohol Consumption:  Social History     Substance and Sexual Activity   Alcohol Use Never     Fall Risk Screen:    STEADI Fall Risk Assessment was completed, and patient is at LOW risk for falls.Assessment completed on:2023    Depression Screenin/30/2023     1:08 PM   PHQ-2/PHQ-9 Depression Screening   Little Interest or Pleasure in Doing Things 0-->not at all   Feeling Down, Depressed or Hopeless 0-->not at all   PHQ-9: Brief Depression Severity Measure Score 0       Health Habits and Functional and Cognitive Screenin/30/2023     1:06 PM   Functional & Cognitive Status   Do you have difficulty preparing food and eating? No   Do you have difficulty bathing yourself, getting dressed or grooming yourself? No   Do you have difficulty using the toilet? No   Do you have difficulty moving around from place to place? No   Do you have trouble with steps or getting out of a bed or a chair? Yes   Current Diet Limited Junk Food   Dental Exam Up to date   Eye Exam Up to date   Exercise (times per week) 7 times per week   Current Exercises Include Yard Work;Walking;House Cleaning   Do you need help using the phone?  No   Are you deaf or do you have serious difficulty hearing?  No   Do you need help to go to places out of walking distance? No   Do you need help shopping? No   Do you need help preparing meals?  No   Do you need help with housework?  Yes   Do you need help with laundry? No   Do you need help taking your medications? No   Do you need help managing money? No   Have you " felt unusual stress, anger or loneliness in the last month? No   Who do you live with? Other   If you need help, do you have trouble finding someone available to you? No   Have you been bothered in the last four weeks by sexual problems? No   Do you have difficulty concentrating, remembering or making decisions? Yes       Age-appropriate Screening Schedule:  Refer to the list below for future screening recommendations based on patient's age, sex and/or medical conditions. Orders for these recommended tests are listed in the plan section. The patient has been provided with a written plan.    Health Maintenance   Topic Date Due    ZOSTER VACCINE (2 of 3) 08/28/2019    DXA SCAN  10/23/2022    COVID-19 Vaccine (6 - Pfizer series) 03/03/2023    INFLUENZA VACCINE  10/01/2023    MAMMOGRAM  10/28/2023    LIPID PANEL  01/12/2024    BMI FOLLOWUP  07/19/2024    ANNUAL WELLNESS VISIT  08/30/2024    COLORECTAL CANCER SCREENING  05/02/2027    TDAP/TD VACCINES (2 - Td or Tdap) 04/06/2032    HEPATITIS C SCREENING  Completed    Pneumococcal Vaccine 65+  Completed                  CMS Preventative Services Quick Reference  Risk Factors Identified During Encounter  Immunizations Discussed/Encouraged: Shingrix and COVID19  The above risks/problems have been discussed with the patient.  Pertinent information has been shared with the patient in the After Visit Summary.  An After Visit Summary and PPPS were made available to the patient.    Follow Up:   Next Medicare Wellness visit to be scheduled in 1 year.       Additional E&M Note during same encounter follows:  Patient has multiple medical problems which are significant and separately identifiable that require additional work above and beyond the Medicare Wellness Visit.      Chief Complaint  Medicare Wellness-subsequent    Subjective        Patient is here today for Medicare wellness visit subsequent annual    And then also medication refills on the chronic comorbid conditions  managed from the primary care standpoint--    -- BPPV/vertigo: Tolerates the meclizine very well with no side effects no issues reported and has been worked up in the past for this responds very well to the meclizine    -- Insomnia: Patient has been on the amitriptyline for many years now with no side effects no issues reported no SI/HI tolerates medication well with no side effects and overall reported as stable    --Obesity: Patient reports that she was finally approved through her insurance for the Wegovy at that right now it is on backorder and she would like that recent in to see if they have gotten it yet she denies having any personal or family history of medullary thyroid cancer and also denies having any personal or family history of multiple endocrine neoplasia syndrome    She also reports that she needs her updated mammogram and her bone density ordered and denies having any issues or problems    She also reports that she has been having an issue with rash and breakdown or cracking dryness of the skin under the abdominal apron area    And for several months now she has been also having issues with urge incontinence and she says sometimes it is awful and sometimes is not bad at all but she has to wear a pad 24/7    And then she also brought in lab orders for the podiatrist and reports that she was told she does not have to do the EKG    Suzanne Gutierrez is also being seen today for med refills on the migraine med and the vertigo med     Review of Systems   Constitutional:  Positive for fatigue.        Gained and was approved for the wegovy and just on back order    HENT:  Negative for trouble swallowing.    Eyes:  Negative for visual disturbance.   Respiratory:  Positive for shortness of breath. Negative for choking.         With the chronic lung Dz    Cardiovascular:  Negative for chest pain.   Gastrointestinal:  Negative for abdominal pain and blood in stool.   Endocrine: Negative for polydipsia,  "polyphagia and polyuria.   Genitourinary:  Positive for frequency and urgency. Negative for difficulty urinating, dysuria and vaginal bleeding.        Ongoing for months    Musculoskeletal:  Negative for back pain and neck pain.   Skin:  Positive for rash.   Neurological:  Negative for dizziness, seizures, syncope and light-headedness.   Hematological:  Bruises/bleeds easily.   Psychiatric/Behavioral:  Negative for self-injury and suicidal ideas.      Objective   Vital Signs:  /72 (BP Location: Right arm, Patient Position: Sitting, Cuff Size: Adult)   Pulse 106   Temp 96.6 øF (35.9 øC) (Temporal)   Ht 162.6 cm (64\")   Wt 106 kg (233 lb)   SpO2 92%   BMI 39.99 kg/mý     Physical Exam  Vitals and nursing note reviewed.   Constitutional:       Appearance: Normal appearance. She is obese.   HENT:      Head: Normocephalic.      Right Ear: Tympanic membrane, ear canal and external ear normal.      Left Ear: Tympanic membrane, ear canal and external ear normal.      Nose: Nose normal.      Mouth/Throat:      Mouth: Mucous membranes are moist.   Eyes:      Pupils: Pupils are equal, round, and reactive to light.   Cardiovascular:      Rate and Rhythm: Normal rate and regular rhythm.      Heart sounds: Normal heart sounds.   Pulmonary:      Effort: Pulmonary effort is normal.      Breath sounds: Normal breath sounds.   Abdominal:      Palpations: Abdomen is soft.   Musculoskeletal:         General: Normal range of motion.      Cervical back: Normal range of motion and neck supple.   Skin:     General: Skin is warm and dry.      Comments: To the lower aspect f the abd apron area rash dried red and cracking and irritated    Neurological:      Mental Status: She is alert and oriented to person, place, and time.   Psychiatric:         Mood and Affect: Mood normal.         Behavior: Behavior normal.         Thought Content: Thought content normal.         Judgment: Judgment normal.                  Office Visit with " Shiv Mckeon MD (08/29/2023)   Also reviewed the visit with pulmonary DR Mcclellan on 5/23/23      Assessment and Plan   Diagnoses and all orders for this visit:    1. Medicare annual wellness visit, subsequent (Primary)  -     Mammo Screening Digital Tomosynthesis Bilateral With CAD; Future  -     DEXA Bone Density Axial; Future    2. Insomnia, unspecified type  -     amitriptyline (ELAVIL) 100 MG tablet; Take 1 tablet by mouth Every Night.  Dispense: 90 tablet; Refill: 1    3. Vertigo  -     meclizine (ANTIVERT) 25 MG tablet; Take 1 tablet by mouth 3 (Three) Times a Day As Needed for Dizziness.  Dispense: 90 tablet; Refill: 2    4. Immunization counseling  -     Zoster Vac Recomb Adjuvanted 50 MCG/0.5ML reconstituted suspension; Inject 0.5 mL into the appropriate muscle as directed by prescriber 1 (One) Time for 1 dose. 2nd dose 2-6 months later  Dispense: 1 each; Refill: 1    5. Postmenopausal  -     DEXA Bone Density Axial; Future    6. Screening mammogram for breast cancer  -     Mammo Screening Digital Tomosynthesis Bilateral With CAD; Future    7. Class 2 severe obesity due to excess calories with serious comorbidity and body mass index (BMI) of 39.0 to 39.9 in adult  -     Semaglutide-Weight Management (Wegovy) 0.25 MG/0.5ML solution auto-injector; Inject 0.25 mg under the skin into the appropriate area as directed 1 (One) Time Per Week.  Dispense: 2 mL; Refill: 0    8. Yeast dermatitis  -     nystatin (MYCOSTATIN) 734125 UNIT/GM cream; Apply 1 application  topically to the appropriate area as directed 2 (Two) Times a Day. Apply to the affected area BID tot he lower abd area as needed for yeast dermatitis  Dispense: 30 g; Refill: 3    9. Urge incontinence of urine  -     tolterodine LA (Detrol LA) 2 MG 24 hr capsule; Take 1 capsule by mouth Daily.  Dispense: 90 capsule; Refill: 1  -     Urinalysis With Culture If Indicated - Urine, Clean Catch    We will do a trial of the lowest dose of the Detrol LA and  see if this helps         Follow Up   Return if symptoms worsen or fail to improve.  Patient was given instructions and counseling regarding her condition or for health maintenance advice. Please see specific information pulled into the AVS if appropriate.

## 2023-08-30 NOTE — PROGRESS NOTES
..  Venipuncture Blood Specimen Collection  Venipuncture performed in LT arm byVioleta Amor with good hemostasis. Patient tolerated the procedure well without complications.   08/30/23   Vanessa Justin MA

## 2023-08-31 NOTE — PROGRESS NOTES
Please mail letter to patient stating    Suzanne the urinalysis just shows trace protein and everything else was normal

## 2023-09-29 ENCOUNTER — TELEPHONE (OUTPATIENT)
Dept: FAMILY MEDICINE CLINIC | Facility: CLINIC | Age: 73
End: 2023-09-29

## 2023-09-29 NOTE — TELEPHONE ENCOUNTER
Pharmacy Name: [id number]  [place] - [address] AT [facility] [phone]  - [phone] FX     What question does the pharmacy have: CALLED TO SEE IF THE OFFICE RECEIVED THE FAX FOR A PRIOR AUTHORIZATION FOR PATIENT.     Who is the provider that prescribed the medication: ALBAN NG

## 2023-10-30 RX ORDER — LEVETIRACETAM 500 MG/1
500 TABLET ORAL 2 TIMES DAILY
Qty: 180 TABLET | Refills: 3 | OUTPATIENT
Start: 2023-10-30

## 2023-10-30 NOTE — TELEPHONE ENCOUNTER
Spoke with pt on the phone. She stated she has enough medication until next appt.   Next appt 11/21/23.

## 2023-12-05 ENCOUNTER — OFFICE VISIT (OUTPATIENT)
Dept: ORTHOPEDIC SURGERY | Facility: CLINIC | Age: 73
End: 2023-12-05
Payer: MEDICARE

## 2023-12-05 VITALS — BODY MASS INDEX: 39.78 KG/M2 | WEIGHT: 233 LBS | HEIGHT: 64 IN

## 2023-12-05 DIAGNOSIS — M17.0 BILATERAL PRIMARY OSTEOARTHRITIS OF KNEE: Primary | ICD-10-CM

## 2023-12-05 RX ADMIN — LIDOCAINE HYDROCHLORIDE 5 ML: 10 INJECTION, SOLUTION INFILTRATION; PERINEURAL at 09:09

## 2023-12-05 RX ADMIN — TRIAMCINOLONE ACETONIDE 40 MG: 40 INJECTION, SUSPENSION INTRA-ARTICULAR; INTRAMUSCULAR at 09:09

## 2023-12-05 NOTE — PROGRESS NOTES
"Chief Complaint  Follow-up and Pain of the Left Knee and Follow-up and Pain of the Right Knee     Subjective      Suzanne Gutiererz presents to [facility] for follow up evaluation of the bilateral knees. The patient has been treating her bilateral knee osteoarthritis conservatively. She has been getting injections with relief.     Allergies   Allergen Reactions    Cephalexin Itching    Excedrin Migraine [Asa-Apap-Caff Buffered] GI Intolerance    Gabapentin Unknown - High Severity    Naproxen Unknown - High Severity        Social History     Socioeconomic History    Marital status:    Tobacco Use    Smoking status: Former     Packs/day: 1.00     Years: 30.00     Additional pack years: 0.00     Total pack years: 30.00     Types: Cigarettes     Quit date: 2004     Years since quittin.4    Smokeless tobacco: Never   Vaping Use    Vaping Use: Never used   Substance and Sexual Activity    Alcohol use: Never    Drug use: Never    Sexual activity: Not Currently        I reviewed the patient's chief complaint, history of present illness, review of systems, past medical history, surgical history, family history, social history, medications, and allergy list.     Review of Systems     Constitutional: Denies fevers, chills, weight loss  Cardiovascular: Denies chest pain, shortness of breath  Skin: Denies rashes, acute skin changes  Neurologic: Denies headache, loss of consciousness  MSK: bilateral knee pain      Vital Signs:   Ht 162.6 cm (64\")   Wt 106 kg (233 lb)   BMI 39.99 kg/m²          Physical Exam  General: Alert. No acute distress    Ortho Exam        BILATERAL KNEES Flexion 110. Extension -3. Stable to varus/valgus stress. Stable to anterior/posterior drawer. Neurovascularly intact. Negative Bruce. Negative Lachman. Positive EHL, FHL, HS and TA. Sensation intact to light touch all 5 nerves of the foot. Ambulates with Antalgic gait. Patella is well tracking. Calf supple, " non-tender. Positive tenderness to the medial joint line. Positive tenderness to the lateral joint line. Positive Crepitus. Good strength to hamstrings, quadriceps, dorsiflexors, and plantar flexors.  Knee Extensor Mechanism intact       Large Joint Arthrocentesis: R knee  Date/Time: 12/5/2023 9:09 AM  Consent given by: patient  Site marked: site marked  Timeout: Immediately prior to procedure a time out was called to verify the correct patient, procedure, equipment, support staff and site/side marked as required   Supporting Documentation  Indications: pain   Procedure Details  Location: knee - R knee  Needle gauge: 21g.  Medications administered: 5 mL lidocaine 1 %; 40 mg triamcinolone acetonide 40 MG/ML  Patient tolerance: patient tolerated the procedure well with no immediate complications      Large Joint Arthrocentesis: L knee  Date/Time: 12/5/2023 9:09 AM  Consent given by: patient  Site marked: site marked  Timeout: Immediately prior to procedure a time out was called to verify the correct patient, procedure, equipment, support staff and site/side marked as required   Supporting Documentation  Indications: pain   Procedure Details  Location: knee - L knee  Needle gauge: 21g.  Medications administered: 5 mL lidocaine 1 %; 40 mg triamcinolone acetonide 40 MG/ML  Patient tolerance: patient tolerated the procedure well with no immediate complications        Imaging Results (Most Recent)       None             Result Review :         No results found.           Assessment and Plan     Diagnoses and all orders for this visit:    1. Bilateral primary osteoarthritis of knee (Primary)        Discussed the treatment plan with the patient.  Plan to continue conservative treatment at this time. Discussed the risks and benefits of bilateral knee steroid injections. The patient expressed understanding and wished to proceed. She tolerated the injections well.     Call or return if worsening symptoms.    Follow Up     3  months      Patient was given instructions and counseling regarding her condition or for health maintenance advice. Please see specific information pulled into the AVS if appropriate.     Scribed for Shiv Mckeon MD by Shereen Noel.  12/05/23   08:55 EST    I have personally performed the services described in this document as scribed by the above individual and it is both accurate and complete. Shiv Mckeon MD 12/06/23

## 2023-12-06 RX ORDER — LIDOCAINE HYDROCHLORIDE 10 MG/ML
5 INJECTION, SOLUTION INFILTRATION; PERINEURAL
Status: COMPLETED | OUTPATIENT
Start: 2023-12-05 | End: 2023-12-05

## 2023-12-06 RX ORDER — TRIAMCINOLONE ACETONIDE 40 MG/ML
40 INJECTION, SUSPENSION INTRA-ARTICULAR; INTRAMUSCULAR
Status: COMPLETED | OUTPATIENT
Start: 2023-12-05 | End: 2023-12-05

## 2023-12-14 ENCOUNTER — HOSPITAL ENCOUNTER (OUTPATIENT)
Dept: MAMMOGRAPHY | Facility: HOSPITAL | Age: 73
Discharge: HOME | End: 2023-12-14
Payer: MEDICARE

## 2023-12-14 ENCOUNTER — HOSPITAL ENCOUNTER (OUTPATIENT)
Dept: BONE DENSITY | Facility: HOSPITAL | Age: 73
Discharge: HOME | End: 2023-12-14
Payer: MEDICARE

## 2023-12-14 DIAGNOSIS — Z78.0 POSTMENOPAUSAL: ICD-10-CM

## 2023-12-14 DIAGNOSIS — Z00.00 MEDICARE ANNUAL WELLNESS VISIT, SUBSEQUENT: ICD-10-CM

## 2023-12-14 DIAGNOSIS — Z12.31 SCREENING MAMMOGRAM FOR BREAST CANCER: ICD-10-CM

## 2023-12-14 PROCEDURE — 77067 SCR MAMMO BI INCL CAD: CPT

## 2023-12-14 PROCEDURE — 77080 DXA BONE DENSITY AXIAL: CPT

## 2023-12-14 PROCEDURE — 77063 BREAST TOMOSYNTHESIS BI: CPT

## 2023-12-14 NOTE — PROGRESS NOTES
Please mail letter to patient stating    Mrs. Gutierrez mammogram was read as negative/benign and for you to continue doing your yearly mammographic screenings and I would like you to continue doing your monthly self breast exams please

## 2023-12-14 NOTE — PROGRESS NOTES
Please mail letter to patient stating    Ms. Gutierrez the bone density was completely normal and suggests go ahead and continue doing good weightbearing exercises like 30 to 60 minutes daily of walking and then good calcium with vitamin D intake with a calcium equaling 1200 to 1500 mg 3 your diet and supplement combined

## 2024-02-01 ENCOUNTER — OFFICE VISIT (OUTPATIENT)
Dept: FAMILY MEDICINE CLINIC | Facility: CLINIC | Age: 74
End: 2024-02-01
Payer: MEDICARE

## 2024-02-01 VITALS
HEIGHT: 64 IN | OXYGEN SATURATION: 96 % | BODY MASS INDEX: 37.9 KG/M2 | SYSTOLIC BLOOD PRESSURE: 126 MMHG | WEIGHT: 222 LBS | HEART RATE: 72 BPM | TEMPERATURE: 98.2 F | DIASTOLIC BLOOD PRESSURE: 72 MMHG

## 2024-02-01 DIAGNOSIS — J06.9 UPPER RESPIRATORY TRACT INFECTION, UNSPECIFIED TYPE: ICD-10-CM

## 2024-02-01 DIAGNOSIS — J01.00 SUBACUTE MAXILLARY SINUSITIS: ICD-10-CM

## 2024-02-01 DIAGNOSIS — G47.00 INSOMNIA, UNSPECIFIED TYPE: Primary | ICD-10-CM

## 2024-02-01 DIAGNOSIS — Z20.828 EXPOSURE TO INFLUENZA: ICD-10-CM

## 2024-02-01 DIAGNOSIS — N39.41 URGE INCONTINENCE OF URINE: ICD-10-CM

## 2024-02-01 DIAGNOSIS — Z20.822 CLOSE EXPOSURE TO COVID-19 VIRUS: ICD-10-CM

## 2024-02-01 LAB
FLUAV SUBTYP SPEC NAA+PROBE: NOT DETECTED
FLUBV RNA ISLT QL NAA+PROBE: NOT DETECTED
RSV RNA NPH QL NAA+NON-PROBE: NOT DETECTED
SARS-COV-2 RNA RESP QL NAA+PROBE: DETECTED

## 2024-02-01 PROCEDURE — 87637 SARSCOV2&INF A&B&RSV AMP PRB: CPT | Performed by: NURSE PRACTITIONER

## 2024-02-01 RX ORDER — AZITHROMYCIN 250 MG/1
TABLET, FILM COATED ORAL
Qty: 6 TABLET | Refills: 0 | Status: SHIPPED | OUTPATIENT
Start: 2024-02-01

## 2024-02-01 RX ORDER — AMITRIPTYLINE HYDROCHLORIDE 100 MG/1
100 TABLET ORAL NIGHTLY
Qty: 90 TABLET | Refills: 1 | Status: SHIPPED | OUTPATIENT
Start: 2024-02-01

## 2024-02-01 RX ORDER — TOLTERODINE 2 MG/1
2 CAPSULE, EXTENDED RELEASE ORAL DAILY
Qty: 90 CAPSULE | Refills: 1 | Status: SHIPPED | OUTPATIENT
Start: 2024-02-01

## 2024-02-01 RX ORDER — METHYLPREDNISOLONE 4 MG/1
TABLET ORAL
Qty: 21 EACH | Refills: 0 | Status: SHIPPED | OUTPATIENT
Start: 2024-02-01

## 2024-02-01 NOTE — PROGRESS NOTES
Chief Complaint  Cough (Slight productive cough, headache, head congestion, face is tender, ears are hurting and this all started in the last two weeks and has stayed about the same. )    Subjective            Suzanne Gutierrez presents to [facility]  History of Present Illness  Pt is here today for the subacute s/s of the productive cough and sinus HA and head congestion and face tenderness in the sinus areas--and reports these s/s started 2 weeks ago and she has also self quarantined during this time as well --and also was exposed to COVID and flu both and strep and she would like to go ahead and have the respiratory swab because of the exposures and her fibrotic lung disease    ____________________________________    Then also needs medication refills on the medications manage from the primary care standpoint regarding the overactive bladder and the insomnia    -- Insomnia: Denies all SI/HI tolerating medication very well with no side effects no issues reported overall stable and receiving good efficacy with the medication    -- Overactive bladder: Tolerating medication well with no side effects no issues reported receiving good efficacy from the medication overall    Continues to see neurology ( Yasmeen Matamoros, P.A.) regarding the migraines and also continues to see pulmonary (DR. Mcclellan)  regarding the fibrotic lung disease and COPD and then also sees the ortho (DR Mckoen) as well       PHQ-2 Total Score: 0  PHQ-9 Total Score: 0    Past Medical History:   Diagnosis Date    Diverticulitis     Insomnia     Oxygen dependent     wears 02 at hs only    Pre-diabetes     Pulmonary fibrosis     Seizures     Sleep apnea     Vertigo     Vertigo        Allergies   Allergen Reactions    Cephalexin Itching    Excedrin Migraine [Asa-Apap-Caff Buffered] GI Intolerance    Gabapentin Unknown - High Severity    Naproxen Unknown - High Severity        Past Surgical History:   Procedure Laterality  Date    APPENDECTOMY      CHOLECYSTECTOMY      COLONOSCOPY N/A 2022    Procedure: COLONOSCOPY WITH POLYPECTOMIES, BX;  Surgeon: Nicko Aranda MD;  Location: [facility];  Service: Gastroenterology;  Laterality: N/A;  COLON POLYPS, DIVERTICULOSIS, HEMORRHOIDS, RECTAL ULCER    HYSTERECTOMY      TONSILLECTOMY      TUBAL ABDOMINAL LIGATION          Social History     Tobacco Use    Smoking status: Former     Packs/day: 1.00     Years: 30.00     Additional pack years: 0.00     Total pack years: 30.00     Types: Cigarettes     Quit date: 2004     Years since quittin.5    Smokeless tobacco: Never   Vaping Use    Vaping Use: Never used   Substance Use Topics    Alcohol use: Never    Drug use: Never       Family History   Problem Relation Age of Onset    Alcohol abuse Father     Prostate cancer Brother     Breast cancer Sister     Breast cancer Maternal Aunt     Colon cancer Neg Hx     Ovarian cancer Neg Hx     Uterine cancer Neg Hx         Health Maintenance Due   Topic Date Due    ZOSTER VACCINE (3 of 3) 2023    LIPID PANEL  2024        Current Outpatient Medications on File Prior to Visit   Medication Sig    albuterol sulfate  (90 Base) MCG/ACT inhaler INHALE 2 PUFFS BY MOUTH EVERY 6 HOURS AS NEEDED FOR WHEEZING OR SHORTNESS OF BREATH    aspirin 81 MG chewable tablet aspirin 81 mg oral tablet,chewable chew 1 tablet (81 mg) by oral route once daily   Active    Esbriet 267 MG capsule     Fluticasone-Umeclidin-Vilant (Trelegy Ellipta) 100-62.5-25 MCG/INH inhaler Inhale 1 puff Daily.    ipratropium-albuterol (DUO-NEB) 0.5-2.5 mg/3 ml nebulizer INHALE 3 ML VIA NEBULIZER THREE TIMES A DAY AS NEEDED FOR WHEEZING OR SHORTNESS OF AIR    levETIRAcetam (KEPPRA) 500 MG tablet Take 1 tablet by mouth 2 (Two) Times a Day.    meclizine (ANTIVERT) 25 MG tablet Take 1 tablet by mouth 3 (Three) Times a Day As Needed for Dizziness.    mupirocin (BACTROBAN) 2 % ointment Apply 1 application topically  to the appropriate area as directed 3 (Three) Times a Day.    nystatin (MYCOSTATIN) 770820 UNIT/GM cream Apply 1 application  topically to the appropriate area as directed 2 (Two) Times a Day. Apply to the affected area BID tot he lower abd area as needed for yeast dermatitis    pregabalin (LYRICA) 100 MG capsule Take 1 capsule by mouth 2 (Two) Times a Day.    Semaglutide-Weight Management (Wegovy) 0.25 MG/0.5ML solution auto-injector Inject 0.25 mg under the skin into the appropriate area as directed 1 (One) Time Per Week.    vitamin D3 125 MCG (5000 UT) capsule capsule cholecalciferol (vitamin D3) 125 mcg (5,000 unit) oral capsule take 1 capsule by oral route daily   Active    [DISCONTINUED] amitriptyline (ELAVIL) 100 MG tablet Take 1 tablet by mouth Every Night.    [DISCONTINUED] tolterodine LA (Detrol LA) 2 MG 24 hr capsule Take 1 capsule by mouth Daily.     No current facility-administered medications on file prior to visit.       Immunization History   Administered Date(s) Administered    ABRYSVO (RSV, 60+ or pregnant women 32-36 wks) 10/17/2023    COVID-19 (MODERNA) 1st,2nd,3rd Dose Monovalent 12/15/2021    COVID-19 (MODERNA) Monovalent Original Booster 12/15/2021    COVID-19 (PFIZER) BIVALENT 12+YRS 11/03/2022    COVID-19 (PFIZER) Purple Cap Monovalent 05/07/2021, 06/01/2021    COVID-19 F23 (MODERNA) 12YRS+ (SPIKEVAX) 10/17/2023    Fluad Quad 65+ 11/03/2022    Fluzone High-Dose 65+yrs 10/02/2021, 09/21/2023    Influenza Quad Vaccine (Inpatient) 09/12/2014    PPD Test 07/15/2021, 07/19/2023    Pneumococcal Conjugate 13-Valent (PCV13) 06/19/2017    Pneumococcal Polysaccharide (PPSV23) 07/03/2019    Shingrix 09/21/2023    Tdap 04/06/2022    Zostavax 06/19/2017, 07/03/2019       Review of Systems   Constitutional:  Positive for fatigue. Negative for chills and fever.   HENT:  Positive for congestion, postnasal drip, rhinorrhea and sinus pressure. Negative for ear pain and sore throat.         Ears feel plugged   "  Respiratory:  Positive for cough and shortness of breath. Negative for wheezing.         Using her inhaler more than usual--but reports the inhaler does help with the s/s and some sputum production at times and then the pt has not needed nor used the nebulizer as yet      Cardiovascular:  Negative for chest pain.   Gastrointestinal:  Negative for diarrhea, nausea and vomiting.   Endocrine: Negative for polydipsia, polyphagia and polyuria.   Genitourinary:  Positive for frequency and urgency. Negative for dysuria and vaginal bleeding.   Musculoskeletal:  Positive for arthralgias.   Neurological:  Positive for weakness and headache.        Generalized weakness at times    Psychiatric/Behavioral:  Positive for sleep disturbance. Negative for self-injury and suicidal ideas.         Objective     /72   Pulse 72   Temp 98.2 °F (36.8 °C) (Temporal)   Ht 162.6 cm (64\")   Wt 101 kg (222 lb)   SpO2 96%   BMI 38.11 kg/m²       Physical Exam  Vitals and nursing note reviewed.   Constitutional:       Appearance: Normal appearance.      Comments: Pt is down 11#    HENT:      Head: Normocephalic.      Right Ear: Tympanic membrane, ear canal and external ear normal.      Left Ear: Tympanic membrane, ear canal and external ear normal.      Nose: Nose normal.      Right Sinus: Maxillary sinus tenderness present. No frontal sinus tenderness.      Left Sinus: Maxillary sinus tenderness present. No frontal sinus tenderness.      Mouth/Throat:      Mouth: Mucous membranes are moist.      Pharynx: No oropharyngeal exudate or posterior oropharyngeal erythema.   Eyes:      Pupils: Pupils are equal, round, and reactive to light.   Cardiovascular:      Rate and Rhythm: Normal rate and regular rhythm.      Heart sounds: Normal heart sounds.   Pulmonary:      Effort: Pulmonary effort is normal.      Breath sounds: Normal breath sounds.   Abdominal:      Palpations: Abdomen is soft.   Musculoskeletal:         General: Normal range " of motion.      Cervical back: Normal range of motion.   Skin:     General: Skin is warm and dry.   Neurological:      Mental Status: She is alert and oriented to person, place, and time.   Psychiatric:         Mood and Affect: Mood normal.         Behavior: Behavior normal.         Thought Content: Thought content normal.         Judgment: Judgment normal.         Result Review :                      Office Visit with Shiv Mckeon MD (12/05/2023)   SLEEP SPECIALIST - SCAN - SLEEP CENTER ADMISSION REQUIREMENTS, RAMANDEEP, 01/23/2023 (02/06/2023)   ASSESSMENT/PATIENT HISTORY - SCAN - PULMONARY SURGICAL CLEARANCE, [place] LUNG SPECS, 07/11/2023 (07/11/2023)   Comprehensive metabolic panel (08/30/2023 14:08)  CBC w AUTO Differential (08/30/2023 14:08)  Urinalysis With Culture If Indicated - Urine, Clean Catch (08/30/2023 13:40)     Assessment and Plan      Diagnoses and all orders for this visit:    1. Insomnia, unspecified type (Primary)  -     amitriptyline (ELAVIL) 100 MG tablet; Take 1 tablet by mouth Every Night.  Dispense: 90 tablet; Refill: 1    2. Urge incontinence of urine  -     tolterodine LA (Detrol LA) 2 MG 24 hr capsule; Take 1 capsule by mouth Daily.  Dispense: 90 capsule; Refill: 1    3. Close exposure to COVID-19 virus  -     COVID-19, FLU A/B, RSV PCR 1 HR TAT - Swab, Nasopharynx    4. Exposure to influenza  -     COVID-19, FLU A/B, RSV PCR 1 HR TAT - Swab, Nasopharynx    5. Subacute maxillary sinusitis  -     azithromycin (Zithromax Z-Mark) 250 MG tablet; Take 2 tablets by mouth on day 1, then 1 tablet daily on days 2-5  Dispense: 6 tablet; Refill: 0  -     methylPREDNISolone (MEDROL) 4 MG dose pack; Take as directed on package instructions.  Dispense: 21 each; Refill: 0    6. Upper respiratory tract infection, unspecified type  -     COVID-19, FLU A/B, RSV PCR 1 HR TAT - Swab, Nasopharynx  -     azithromycin (Zithromax Z-Mark) 250 MG tablet; Take 2 tablets by mouth on day 1, then 1 tablet daily on days  2-5  Dispense: 6 tablet; Refill: 0  -     methylPREDNISolone (MEDROL) 4 MG dose pack; Take as directed on package instructions.  Dispense: 21 each; Refill: 0    We will do empiric Tx of the URI and the exacerbation of her chronic Lung Dz and then the sinus infection as well--then also the RSV/FLU/COVID swab and will call pt with the results    Then also medication refills on the meds for her chronic conditions         Follow Up     Return in about 6 months (around 8/1/2024), or if symptoms worsen or fail to improve, for Recheck.    Patient was given instructions and counseling regarding her condition or for health maintenance advice. Please see specific information pulled into the AVS if appropriate.            Suzanne Gutierrez  reports that she quit smoking about 19 years ago. Her smoking use included cigarettes. She has a 30.00 pack-year smoking history. She has never used smokeless tobacco.. I have educated her on the risk of diseases from using tobacco products such as cancer, COPD, and heart disease.

## 2024-02-14 ENCOUNTER — TRANSCRIBE ORDERS (OUTPATIENT)
Dept: ADMINISTRATIVE | Facility: HOSPITAL | Age: 74
End: 2024-02-14
Payer: MEDICARE

## 2024-02-14 DIAGNOSIS — R83.9 NONSPECIFIC ABNORMAL FINDING IN CEREBROSPINAL FLUID: Primary | ICD-10-CM

## 2024-02-22 ENCOUNTER — OFFICE VISIT (OUTPATIENT)
Dept: NEUROLOGY | Facility: CLINIC | Age: 74
End: 2024-02-22
Payer: MEDICARE

## 2024-02-22 VITALS
WEIGHT: 224.4 LBS | SYSTOLIC BLOOD PRESSURE: 109 MMHG | DIASTOLIC BLOOD PRESSURE: 79 MMHG | HEART RATE: 148 BPM | BODY MASS INDEX: 38.31 KG/M2 | HEIGHT: 64 IN

## 2024-02-22 DIAGNOSIS — G40.009 PARTIAL IDIOPATHIC EPILEPSY WITH SEIZURES OF LOCALIZED ONSET, NOT INTRACTABLE, WITHOUT STATUS EPILEPTICUS: Primary | ICD-10-CM

## 2024-02-22 RX ORDER — LEVETIRACETAM 500 MG/1
500 TABLET ORAL 2 TIMES DAILY
Qty: 180 TABLET | Refills: 3 | Status: SHIPPED | OUTPATIENT
Start: 2024-02-22

## 2024-02-22 NOTE — PROGRESS NOTES
"Chief Complaint  Seizures    Subjective          Suzanne Gutierrez presents to [facility]  History of Present Illness  Denies seizures since previous visit.  States she's been doing well with Keppra.  Denies side effect.       Objective   Vital Signs:   /79   Pulse (!) 148   Ht 162.6 cm (64\")   Wt 102 kg (224 lb 6.4 oz)   BMI 38.52 kg/m²     Physical Exam  HENT:      Head: Normocephalic.   Pulmonary:      Effort: Pulmonary effort is normal.   Neurological:      Mental Status: She is alert and oriented to person, place, and time.      Sensory: Sensation is intact.      Motor: Motor function is intact.      Coordination: Coordination is intact.      Deep Tendon Reflexes: Reflexes are normal and symmetric.        Neurologic Exam     Mental Status   Oriented to person, place, and time.        Result Review :   CBC:  Lab Results   Component Value Date    WBC 12.78 (H) 08/30/2023    RBC 4.49 08/30/2023    HGB 13.8 08/30/2023    HCT 40.9 08/30/2023    MCV 91.1 08/30/2023    MCH 30.7 08/30/2023    MCHC 33.7 08/30/2023    RDW 12.5 08/30/2023     08/30/2023     CMP:  Lab Results   Component Value Date    BUN 12 08/30/2023    CREATININE 0.83 08/30/2023    EGFRIFNONA 72 07/12/2021     08/30/2023    K 4.3 08/30/2023     08/30/2023    CALCIUM 10.0 08/30/2023    ALBUMIN 4.3 08/30/2023    BILITOT <0.2 08/30/2023    ALKPHOS 87 08/30/2023    AST 18 08/30/2023    ALT 16 08/30/2023               Assessment and Plan    Diagnoses and all orders for this visit:    1. Partial idiopathic epilepsy with seizures of localized onset, not intractable, without status epilepticus (Primary)  Assessment & Plan:  Continue Keppra.      Discussed routine seizure precautions including, but not limited to, avoiding bathing alone, swimming alone, climbing on ladders.  Also discussed need for medication compliance and risks of unmanaged epilepsy including status epilepticus, permanent " brain injury or potentially death.  Patient is aware of [place] state law regarding no driving for 90 days following a seizure.        Other orders  -     levETIRAcetam (KEPPRA) 500 MG tablet; Take 1 tablet by mouth 2 (Two) Times a Day.  Dispense: 180 tablet; Refill: 3        Follow Up   Return in about 1 year (around 2/22/2025) for seizure f/u.  Patient was given instructions and counseling regarding her condition or for health maintenance advice. Please see specific information pulled into the AVS if appropriate.

## 2024-03-21 DIAGNOSIS — G47.00 INSOMNIA, UNSPECIFIED TYPE: ICD-10-CM

## 2024-03-21 RX ORDER — AMITRIPTYLINE HYDROCHLORIDE 100 MG/1
100 TABLET ORAL NIGHTLY
Qty: 90 TABLET | Refills: 3 | OUTPATIENT
Start: 2024-03-21

## 2024-04-12 DIAGNOSIS — N39.41 URGE INCONTINENCE OF URINE: ICD-10-CM

## 2024-04-12 RX ORDER — TOLTERODINE 2 MG/1
2 CAPSULE, EXTENDED RELEASE ORAL DAILY
Qty: 90 CAPSULE | Refills: 3 | OUTPATIENT
Start: 2024-04-12

## 2024-05-11 DIAGNOSIS — N39.41 URGE INCONTINENCE OF URINE: ICD-10-CM

## 2024-05-12 RX ORDER — TOLTERODINE 2 MG/1
2 CAPSULE, EXTENDED RELEASE ORAL DAILY
Qty: 90 CAPSULE | Refills: 0 | Status: SHIPPED | OUTPATIENT
Start: 2024-05-12

## 2024-07-15 ENCOUNTER — TELEPHONE (OUTPATIENT)
Dept: NEUROLOGY | Facility: CLINIC | Age: 74
End: 2024-07-15

## 2024-07-15 NOTE — TELEPHONE ENCOUNTER
Provider: MEGAN    Caller: BIRD    [facility]    Phone Number: [phone]    Reason for Call: PT CALLED AND STATE STAT SHE HAS BEEN HAVING INCREASED HEADACHES AND IS WANTING TO KNOW IF THERE IS ANYTHING THAT PROVIDER SUGGESTED PT DO. PT IS WANTING TO KNOW IF THREE IS ANY MEDICATION SHE CAN TAKE TO HELP WITH HEADACHES AS PT WAS IN BED ALL WEEKEND .    PLEASE REVIEW AND ADVISE  THANK YOU

## 2024-07-17 DIAGNOSIS — G47.00 INSOMNIA, UNSPECIFIED TYPE: ICD-10-CM

## 2024-07-17 RX ORDER — AMITRIPTYLINE HYDROCHLORIDE 100 MG/1
100 TABLET ORAL NIGHTLY
Qty: 30 TABLET | Refills: 0 | Status: SHIPPED | OUTPATIENT
Start: 2024-07-17

## 2024-08-13 ENCOUNTER — PATIENT ROUNDING (BHMG ONLY) (OUTPATIENT)
Dept: NEUROLOGY | Facility: CLINIC | Age: 74
End: 2024-08-13
Payer: MEDICARE

## 2024-08-13 ENCOUNTER — OFFICE VISIT (OUTPATIENT)
Dept: NEUROLOGY | Facility: CLINIC | Age: 74
End: 2024-08-13
Payer: MEDICARE

## 2024-08-13 ENCOUNTER — PRIOR AUTHORIZATION (OUTPATIENT)
Dept: NEUROLOGY | Facility: CLINIC | Age: 74
End: 2024-08-13

## 2024-08-13 VITALS
HEART RATE: 84 BPM | DIASTOLIC BLOOD PRESSURE: 63 MMHG | SYSTOLIC BLOOD PRESSURE: 124 MMHG | BODY MASS INDEX: 37.76 KG/M2 | HEIGHT: 64 IN | WEIGHT: 221.2 LBS

## 2024-08-13 DIAGNOSIS — M54.81 OCCIPITAL NEURALGIA OF LEFT SIDE: Primary | ICD-10-CM

## 2024-08-13 PROCEDURE — G2211 COMPLEX E/M VISIT ADD ON: HCPCS | Performed by: NURSE PRACTITIONER

## 2024-08-13 PROCEDURE — 99214 OFFICE O/P EST MOD 30 MIN: CPT | Performed by: NURSE PRACTITIONER

## 2024-08-13 NOTE — PROGRESS NOTES
"Chief Complaint  Headache    Subjective          Suzanne Gutierrez presents to [facility]  History of Present Illness  States she had a new onset of left sided headaches about 3 months ago.  Starts in the back of the left occiput and radiates to the vertex.  Denies neck pain or shoulder pain.  Occurring about twice per week.        Objective   Vital Signs:   /63   Pulse 84   Ht 162.6 cm (64\")   Wt 100 kg (221 lb 3.2 oz)   BMI 37.97 kg/m²     Physical Exam  HENT:      Head: Normocephalic.   Neck:      Comments: Left occipital tenderness.   Pulmonary:      Effort: Pulmonary effort is normal.   Neurological:      Mental Status: She is alert and oriented to person, place, and time.      Sensory: Sensation is intact.      Motor: Motor function is intact.      Coordination: Coordination is intact.      Deep Tendon Reflexes: Reflexes are normal and symmetric.        Neurologic Exam     Mental Status   Oriented to person, place, and time.        Result Review :               Assessment and Plan    Diagnoses and all orders for this visit:    1. Occipital neuralgia of left side (Primary)  Assessment & Plan:  Will order left occipital nerve block.                 Follow Up   No follow-ups on file.  Patient was given instructions and counseling regarding her condition or for health maintenance advice. Please see specific information pulled into the AVS if appropriate.       "
73

## 2024-08-15 NOTE — TELEPHONE ENCOUNTER
Spoke with patient and she requested a spot around 10am for injections. Patient states morning is the only time she can get here due to work.

## 2024-08-19 ENCOUNTER — HOSPITAL ENCOUNTER (OUTPATIENT)
Dept: CT IMAGING | Facility: HOSPITAL | Age: 74
Discharge: HOME | End: 2024-08-19
Admitting: INTERNAL MEDICINE
Payer: MEDICARE

## 2024-08-19 ENCOUNTER — PROCEDURE VISIT (OUTPATIENT)
Dept: NEUROLOGY | Facility: CLINIC | Age: 74
End: 2024-08-19
Payer: MEDICARE

## 2024-08-19 DIAGNOSIS — R83.9 NONSPECIFIC ABNORMAL FINDING IN CEREBROSPINAL FLUID: ICD-10-CM

## 2024-08-19 DIAGNOSIS — M54.81 OCCIPITAL NEURALGIA OF LEFT SIDE: Primary | ICD-10-CM

## 2024-08-19 PROCEDURE — 71250 CT THORAX DX C-: CPT

## 2024-08-19 PROCEDURE — 64405 NJX AA&/STRD GR OCPL NRV: CPT | Performed by: NURSE PRACTITIONER

## 2024-08-19 RX ORDER — BUPIVACAINE HYDROCHLORIDE 2.5 MG/ML
3 INJECTION, SOLUTION INFILTRATION; PERINEURAL ONCE
Status: COMPLETED | OUTPATIENT
Start: 2024-08-19 | End: 2024-08-19

## 2024-08-19 RX ADMIN — BUPIVACAINE HYDROCHLORIDE 3 ML: 2.5 INJECTION, SOLUTION INFILTRATION; PERINEURAL at 16:19

## 2024-08-19 NOTE — PROGRESS NOTES
Procedure   CRBTPI    Date/Time: 8/19/2024 4:18 PM    Performed by: Yasmeen Matamoros APRN  Authorized by: Yasmeen Matamorso APRN  Indications: pain relief  Body area: head (Greater and Lesser occipital nerve)  Nerve: greater occipital  Laterality: left    Sedation:  Patient sedated: no    Preparation: Aseptic Technique.  Patient position: sitting  Needle size: 27 G  Location technique: anatomical landmarks  Local Anesthetic: bupivacaine 0.25% without epinephrine  Anesthetic total: 3 (2mL per injection for total of 8mL) mL  Patient tolerance: Patient tolerated the procedure well with no immediate complications

## 2024-08-20 ENCOUNTER — TELEPHONE (OUTPATIENT)
Dept: NEUROLOGY | Facility: CLINIC | Age: 74
End: 2024-08-20
Payer: MEDICARE

## 2024-08-20 ENCOUNTER — PATIENT MESSAGE (OUTPATIENT)
Dept: NEUROLOGY | Facility: CLINIC | Age: 74
End: 2024-08-20
Payer: MEDICARE

## 2024-08-20 NOTE — TELEPHONE ENCOUNTER
Caller: JOHN SCALES    Relationship: Emergency Contact    Best call back number: [phone]    What was the call regarding: PATIENT'S DAUGHTER CALLED AFTER CONVERSING ON MYCHART REGARDING PATIENT REACTION TO A NERVE BLOCK. SHE STATED THAT SHE HAD SPOKEN WITH THE PT AND THAT THERES A DIFFICULTY GETTING UP AND WHEN SHE DID SHE WAS DIZZY.     NAMAN WAS WARM TRANSFERRED TO

## 2024-08-20 NOTE — TELEPHONE ENCOUNTER
Patient's daughter called to give update. She states patient's headache has not changed and the dizziness and vision has gotten worse. She is unable to get around by herself. Daughter told her to just lay there until her sister can get there to help her.

## 2024-08-21 NOTE — TELEPHONE ENCOUNTER
Called patient to check and see how she's doing today. Patient states she's doing a little better today and her dizziness is not as bad as it was. She will continue to stay in contact and let us know if her symptoms change.

## 2024-10-12 DIAGNOSIS — G47.00 INSOMNIA, UNSPECIFIED TYPE: ICD-10-CM

## 2024-10-14 RX ORDER — AMITRIPTYLINE HYDROCHLORIDE 100 MG/1
100 TABLET ORAL NIGHTLY
Qty: 30 TABLET | Refills: 0 | Status: SHIPPED | OUTPATIENT
Start: 2024-10-14

## 2024-10-23 ENCOUNTER — OFFICE VISIT (OUTPATIENT)
Dept: FAMILY MEDICINE CLINIC | Facility: CLINIC | Age: 74
End: 2024-10-23
Payer: MEDICARE

## 2024-10-23 VITALS
BODY MASS INDEX: 37.56 KG/M2 | HEART RATE: 93 BPM | HEIGHT: 64 IN | DIASTOLIC BLOOD PRESSURE: 70 MMHG | WEIGHT: 220 LBS | SYSTOLIC BLOOD PRESSURE: 118 MMHG | TEMPERATURE: 97.3 F | OXYGEN SATURATION: 95 %

## 2024-10-23 DIAGNOSIS — E78.2 MIXED HYPERLIPIDEMIA: ICD-10-CM

## 2024-10-23 DIAGNOSIS — N39.41 URGE INCONTINENCE OF URINE: ICD-10-CM

## 2024-10-23 DIAGNOSIS — J44.9 COPD MIXED TYPE: ICD-10-CM

## 2024-10-23 DIAGNOSIS — R73.03 PREDIABETES: ICD-10-CM

## 2024-10-23 DIAGNOSIS — R53.83 FATIGUE, UNSPECIFIED TYPE: ICD-10-CM

## 2024-10-23 DIAGNOSIS — E66.01 CLASS 2 SEVERE OBESITY WITH SERIOUS COMORBIDITY AND BODY MASS INDEX (BMI) OF 37.0 TO 37.9 IN ADULT, UNSPECIFIED OBESITY TYPE: ICD-10-CM

## 2024-10-23 DIAGNOSIS — R42 VERTIGO: ICD-10-CM

## 2024-10-23 DIAGNOSIS — E66.812 CLASS 2 SEVERE OBESITY WITH SERIOUS COMORBIDITY AND BODY MASS INDEX (BMI) OF 37.0 TO 37.9 IN ADULT, UNSPECIFIED OBESITY TYPE: ICD-10-CM

## 2024-10-23 DIAGNOSIS — E53.8 VITAMIN B12 DEFICIENCY: ICD-10-CM

## 2024-10-23 DIAGNOSIS — J98.4 LUNG DISEASE: ICD-10-CM

## 2024-10-23 DIAGNOSIS — Z00.00 MEDICARE ANNUAL WELLNESS VISIT, SUBSEQUENT: Primary | ICD-10-CM

## 2024-10-23 DIAGNOSIS — G47.00 INSOMNIA, UNSPECIFIED TYPE: ICD-10-CM

## 2024-10-23 DIAGNOSIS — J84.10 FIBROSIS OF LUNG: ICD-10-CM

## 2024-10-23 LAB
ALBUMIN SERPL-MCNC: 4.1 G/DL (ref 3.5–5.2)
ALBUMIN/GLOB SERPL: 1.2 G/DL
ALP SERPL-CCNC: 82 U/L (ref 39–117)
ALT SERPL W P-5'-P-CCNC: 14 U/L (ref 1–33)
ANION GAP SERPL CALCULATED.3IONS-SCNC: 10.7 MMOL/L (ref 5–15)
AST SERPL-CCNC: 18 U/L (ref 1–32)
BASOPHILS # BLD AUTO: 0.04 10*3/MM3 (ref 0–0.2)
BASOPHILS NFR BLD AUTO: 0.5 % (ref 0–1.5)
BILIRUB SERPL-MCNC: 0.3 MG/DL (ref 0–1.2)
BILIRUB UR QL STRIP: NEGATIVE
BUN SERPL-MCNC: 14 MG/DL (ref 8–23)
BUN/CREAT SERPL: 15.6 (ref 7–25)
CALCIUM SPEC-SCNC: 9.8 MG/DL (ref 8.6–10.5)
CHLORIDE SERPL-SCNC: 100 MMOL/L (ref 98–107)
CHOLEST SERPL-MCNC: 205 MG/DL (ref 0–200)
CLARITY UR: CLEAR
CO2 SERPL-SCNC: 27.3 MMOL/L (ref 22–29)
COLOR UR: YELLOW
CREAT SERPL-MCNC: 0.9 MG/DL (ref 0.57–1)
DEPRECATED RDW RBC AUTO: 40 FL (ref 37–54)
EGFRCR SERPLBLD CKD-EPI 2021: 67.2 ML/MIN/1.73
EOSINOPHIL # BLD AUTO: 0.3 10*3/MM3 (ref 0–0.4)
EOSINOPHIL NFR BLD AUTO: 3.8 % (ref 0.3–6.2)
ERYTHROCYTE [DISTWIDTH] IN BLOOD BY AUTOMATED COUNT: 12.2 % (ref 12.3–15.4)
FOLATE SERPL-MCNC: 6.99 NG/ML (ref 4.78–24.2)
GLOBULIN UR ELPH-MCNC: 3.5 GM/DL
GLUCOSE SERPL-MCNC: 94 MG/DL (ref 65–99)
GLUCOSE UR STRIP-MCNC: NEGATIVE MG/DL
HBA1C MFR BLD: 6.1 % (ref 4.8–5.6)
HCT VFR BLD AUTO: 39.6 % (ref 34–46.6)
HDLC SERPL-MCNC: 57 MG/DL (ref 40–60)
HGB BLD-MCNC: 13 G/DL (ref 12–15.9)
HGB UR QL STRIP.AUTO: NEGATIVE
HOLD SPECIMEN: NORMAL
IMM GRANULOCYTES # BLD AUTO: 0.03 10*3/MM3 (ref 0–0.05)
IMM GRANULOCYTES NFR BLD AUTO: 0.4 % (ref 0–0.5)
KETONES UR QL STRIP: NEGATIVE
LDLC SERPL CALC-MCNC: 124 MG/DL (ref 0–100)
LDLC/HDLC SERPL: 2.13 {RATIO}
LEUKOCYTE ESTERASE UR QL STRIP.AUTO: NEGATIVE
LYMPHOCYTES # BLD AUTO: 1.8 10*3/MM3 (ref 0.7–3.1)
LYMPHOCYTES NFR BLD AUTO: 22.5 % (ref 19.6–45.3)
MCH RBC QN AUTO: 29.7 PG (ref 26.6–33)
MCHC RBC AUTO-ENTMCNC: 32.8 G/DL (ref 31.5–35.7)
MCV RBC AUTO: 90.4 FL (ref 79–97)
MONOCYTES # BLD AUTO: 0.62 10*3/MM3 (ref 0.1–0.9)
MONOCYTES NFR BLD AUTO: 7.8 % (ref 5–12)
NEUTROPHILS NFR BLD AUTO: 5.2 10*3/MM3 (ref 1.7–7)
NEUTROPHILS NFR BLD AUTO: 65 % (ref 42.7–76)
NITRITE UR QL STRIP: NEGATIVE
NRBC BLD AUTO-RTO: 0 /100 WBC (ref 0–0.2)
PH UR STRIP.AUTO: 6.5 [PH] (ref 5–8)
PLATELET # BLD AUTO: 272 10*3/MM3 (ref 140–450)
PMV BLD AUTO: 10.1 FL (ref 6–12)
POTASSIUM SERPL-SCNC: 4.3 MMOL/L (ref 3.5–5.2)
PROT SERPL-MCNC: 7.6 G/DL (ref 6–8.5)
PROT UR QL STRIP: NEGATIVE
RBC # BLD AUTO: 4.38 10*6/MM3 (ref 3.77–5.28)
SODIUM SERPL-SCNC: 138 MMOL/L (ref 136–145)
SP GR UR STRIP: 1.01 (ref 1–1.03)
TRIGL SERPL-MCNC: 133 MG/DL (ref 0–150)
TSH SERPL DL<=0.05 MIU/L-ACNC: 2.48 UIU/ML (ref 0.27–4.2)
UROBILINOGEN UR QL STRIP: NORMAL
VIT B12 BLD-MCNC: 370 PG/ML (ref 211–946)
VLDLC SERPL-MCNC: 24 MG/DL (ref 5–40)
WBC NRBC COR # BLD AUTO: 7.99 10*3/MM3 (ref 3.4–10.8)

## 2024-10-23 PROCEDURE — 99214 OFFICE O/P EST MOD 30 MIN: CPT | Performed by: NURSE PRACTITIONER

## 2024-10-23 PROCEDURE — 80053 COMPREHEN METABOLIC PANEL: CPT | Performed by: NURSE PRACTITIONER

## 2024-10-23 PROCEDURE — 82746 ASSAY OF FOLIC ACID SERUM: CPT | Performed by: NURSE PRACTITIONER

## 2024-10-23 PROCEDURE — 80061 LIPID PANEL: CPT | Performed by: NURSE PRACTITIONER

## 2024-10-23 PROCEDURE — 1159F MED LIST DOCD IN RCRD: CPT | Performed by: NURSE PRACTITIONER

## 2024-10-23 PROCEDURE — G0439 PPPS, SUBSEQ VISIT: HCPCS | Performed by: NURSE PRACTITIONER

## 2024-10-23 PROCEDURE — 36415 COLL VENOUS BLD VENIPUNCTURE: CPT | Performed by: NURSE PRACTITIONER

## 2024-10-23 PROCEDURE — 1125F AMNT PAIN NOTED PAIN PRSNT: CPT | Performed by: NURSE PRACTITIONER

## 2024-10-23 PROCEDURE — 1160F RVW MEDS BY RX/DR IN RCRD: CPT | Performed by: NURSE PRACTITIONER

## 2024-10-23 PROCEDURE — 1170F FXNL STATUS ASSESSED: CPT | Performed by: NURSE PRACTITIONER

## 2024-10-23 PROCEDURE — 81003 URINALYSIS AUTO W/O SCOPE: CPT | Performed by: NURSE PRACTITIONER

## 2024-10-23 PROCEDURE — 83036 HEMOGLOBIN GLYCOSYLATED A1C: CPT | Performed by: NURSE PRACTITIONER

## 2024-10-23 PROCEDURE — 84443 ASSAY THYROID STIM HORMONE: CPT | Performed by: NURSE PRACTITIONER

## 2024-10-23 PROCEDURE — 82607 VITAMIN B-12: CPT | Performed by: NURSE PRACTITIONER

## 2024-10-23 PROCEDURE — 85025 COMPLETE CBC W/AUTO DIFF WBC: CPT | Performed by: NURSE PRACTITIONER

## 2024-10-23 RX ORDER — ALBUTEROL SULFATE 90 UG/1
2 INHALANT RESPIRATORY (INHALATION) EVERY 6 HOURS PRN
Qty: 18 G | Refills: 0 | Status: SHIPPED | OUTPATIENT
Start: 2024-10-23

## 2024-10-23 RX ORDER — AMITRIPTYLINE HYDROCHLORIDE 100 MG/1
100 TABLET ORAL NIGHTLY
Qty: 90 TABLET | Refills: 1 | Status: SHIPPED | OUTPATIENT
Start: 2024-10-23

## 2024-10-23 RX ORDER — MECLIZINE HYDROCHLORIDE 25 MG/1
25 TABLET ORAL 3 TIMES DAILY PRN
Qty: 90 TABLET | Refills: 2 | Status: SHIPPED | OUTPATIENT
Start: 2024-10-23

## 2024-10-23 RX ORDER — TOLTERODINE 2 MG/1
2 CAPSULE, EXTENDED RELEASE ORAL DAILY
Qty: 90 CAPSULE | Refills: 1 | Status: SHIPPED | OUTPATIENT
Start: 2024-10-23

## 2024-10-23 RX ORDER — SEMAGLUTIDE 0.25 MG/.5ML
0.25 INJECTION, SOLUTION SUBCUTANEOUS WEEKLY
Qty: 2 ML | Refills: 0 | Status: SHIPPED | OUTPATIENT
Start: 2024-10-23

## 2024-10-23 NOTE — PROGRESS NOTES
..  Venipuncture Blood Specimen Collection  Venipuncture performed in LT arm by Vanessa Justin MA with good hemostasis. Patient tolerated the procedure well without complications.   10/23/24   Vanessa Justin MA

## 2024-10-23 NOTE — PROGRESS NOTES
Medicare wellness and med refills   The ABCs of the Annual Wellness Visit  Medicare Wellness Visit      Suzanne Gutierrez is a 74 y.o. patient who presents for a Medicare Wellness Visit.    The following portions of the patient's history were reviewed and   updated as appropriate: allergies, current medications, past family history, past medical history, past social history, past surgical history, and problem list.    Compared to one year ago, the patient's physical   health is the same.  Compared to one year ago, the patient's mental   health is the same.    Recent Hospitalizations:  She was not admitted to the hospital during the last year.     Current Medical Providers:  Patient Care Team:  Shelley Poole APRN as PCP - General (Nurse Practitioner)  Grayson Mcclellan MD as Consulting Physician (Pulmonary Disease)  Yasmeen Matamoros APRN as Nurse Practitioner (Neurology)  Shiv Mckeon MD as Consulting Physician (Orthopedic Surgery)    Outpatient Medications Prior to Visit   Medication Sig Dispense Refill    aspirin 81 MG chewable tablet aspirin 81 mg oral tablet,chewable chew 1 tablet (81 mg) by oral route once daily   Active      Esbriet 267 MG capsule       Fluticasone-Umeclidin-Vilant (Trelegy Ellipta) 100-62.5-25 MCG/INH inhaler Inhale 1 puff Daily.      ipratropium-albuterol (DUO-NEB) 0.5-2.5 mg/3 ml nebulizer INHALE 3 ML VIA NEBULIZER THREE TIMES A DAY AS NEEDED FOR WHEEZING OR SHORTNESS OF  mL 2    levETIRAcetam (KEPPRA) 500 MG tablet Take 1 tablet by mouth 2 (Two) Times a Day. 180 tablet 3    pregabalin (LYRICA) 100 MG capsule Take 1 capsule by mouth 2 (Two) Times a Day.      vitamin D3 125 MCG (5000 UT) capsule capsule cholecalciferol (vitamin D3) 125 mcg (5,000 unit) oral capsule take 1 capsule by oral route daily   Active      albuterol sulfate  (90 Base) MCG/ACT inhaler INHALE 2 PUFFS BY MOUTH EVERY 6 HOURS AS NEEDED FOR WHEEZING OR SHORTNESS OF BREATH 8.5 g 0    amitriptyline  "(ELAVIL) 100 MG tablet TAKE 1 TABLET BY MOUTH EVERY NIGHT 30 tablet 0    meclizine (ANTIVERT) 25 MG tablet Take 1 tablet by mouth 3 (Three) Times a Day As Needed for Dizziness. 90 tablet 2    tolterodine LA (DETROL LA) 2 MG 24 hr capsule TAKE 1 CAPSULE BY MOUTH DAILY 90 capsule 0     No facility-administered medications prior to visit.     No opioid medication identified on active medication list. I have reviewed chart for other potential  high risk medication/s and harmful drug interactions in the elderly.      Aspirin is on active medication list. Aspirin use is indicated based on review of current medical condition/s. Pros and cons of this therapy have been discussed today. Benefits of this medication outweigh potential harm.  Patient has been encouraged to continue taking this medication.  .      Patient Active Problem List   Diagnosis    Dyspnea    Hoarseness    Pain in both knees    Fibrosis of lung    Lung disease    Otalgia    Other acute sinusitis    Recurrent otitis media    Sleep apnea    Vertigo    COPD mixed type    Partial idiopathic epilepsy with seizures of localized onset, not intractable, without status epilepticus    Primary localized osteoarthrosis of right lower leg    Diverticulitis    Vitamin D deficiency    Vaginal atrophy    Vulvar lesion    Mixed hyperlipidemia    Prediabetes    Insomnia    Occipital neuralgia of left side    Urge incontinence of urine     Advance Care Planning Advance Directive is not on file.  ACP discussion was held with the patient during this visit. Patient does not have an advance directive, declines further assistance. Pt is working on all this             Objective   Vitals:    10/23/24 1139   BP: 118/70   Pulse: 93   Temp: 97.3 °F (36.3 °C)   TempSrc: Temporal   SpO2: 95%   Weight: 99.8 kg (220 lb)   Height: 162.6 cm (64\")   PainSc:   4   PainLoc: Knee  Comment: both       Estimated body mass index is 37.76 kg/m² as calculated from the following:    Height as of " "this encounter: 162.6 cm (64\").    Weight as of this encounter: 99.8 kg (220 lb).    Class 2 Severe Obesity (BMI >=35 and <=39.9). Obesity-related health conditions include the following: dyslipidemias and lung fibrosis  . Obesity is improving with lifestyle modifications. BMI is is above average; BMI management plan is completed. We discussed portion control and increasing exercise.       Does the patient have evidence of cognitive impairment? No                                                                                                Health  Risk Assessment    Smoking Status:  Social History     Tobacco Use   Smoking Status Former    Current packs/day: 0.00    Average packs/day: 1 pack/day for 30.0 years (30.0 ttl pk-yrs)    Types: Cigarettes    Start date: 1974    Quit date: 2004    Years since quittin.3   Smokeless Tobacco Never     Alcohol Consumption:  Social History     Substance and Sexual Activity   Alcohol Use Never       Fall Risk Screen  STEADI Fall Risk Assessment was completed, and patient is at LOW risk for falls.Assessment completed on:10/23/2024    Depression Screening:      10/23/2024    11:43 AM   PHQ-2/PHQ-9 Depression Screening   Little interest or pleasure in doing things Not at all   Feeling down, depressed, or hopeless Not at all     Health Habits and Functional and Cognitive Screening:      10/23/2024    11:42 AM   Functional & Cognitive Status   Do you have difficulty preparing food and eating? No   Do you have difficulty bathing yourself, getting dressed or grooming yourself? No   Do you have difficulty using the toilet? No   Do you have difficulty moving around from place to place? No   Do you have trouble with steps or getting out of a bed or a chair? No   Current Diet Unhealthy Diet   Dental Exam Up to date        Dental Exam Comment dentures   Eye Exam Not up to date   Exercise (times per week) 7 times per week   Current Exercises Include Yard Work;Walking;Other;House " Cleaning   Do you need help using the phone?  No   Are you deaf or do you have serious difficulty hearing?  No   Do you need help to go to places out of walking distance? No   Do you need help shopping? No   Do you need help preparing meals?  No   Do you need help with housework?  No   Do you need help with laundry? No   Do you need help taking your medications? No   Do you need help managing money? No   Do you ever drive or ride in a car without wearing a seat belt? No   Have you felt unusual stress, anger or loneliness in the last month? No   Who do you live with? Child   If you need help, do you have trouble finding someone available to you? No   Have you been bothered in the last four weeks by sexual problems? No   Do you have difficulty concentrating, remembering or making decisions? Yes           Age-appropriate Screening Schedule:  Refer to the list below for future screening recommendations based on patient's age, sex and/or medical conditions. Orders for these recommended tests are listed in the plan section. The patient has been provided with a written plan.    Health Maintenance List  Health Maintenance   Topic Date Due    LIPID PANEL  01/12/2024    BMI FOLLOWUP  07/19/2024    ANNUAL WELLNESS VISIT  08/30/2024    MAMMOGRAM  12/14/2025    DXA SCAN  12/14/2025    COLORECTAL CANCER SCREENING  05/02/2027    TDAP/TD VACCINES (3 - Td or Tdap) 12/14/2033    HEPATITIS C SCREENING  Completed    COVID-19 Vaccine  Completed    INFLUENZA VACCINE  Completed    Pneumococcal Vaccine 65+  Completed    ZOSTER VACCINE  Completed                                                                                                                                                CMS Preventative Services Quick Reference  Risk Factors Identified During Encounter  Migraines and the lipid panels     The above risks/problems have been discussed with the patient.  Pertinent information has been shared with the patient in the After Visit  Summary.  An After Visit Summary and PPPS were made available to the patient.    Follow Up:   Next Medicare Wellness visit to be scheduled in 1 year.         Additional E&M Note during same encounter follows:  Patient has additional, significant, and separately identifiable condition(s)/problem(s) that require work above and beyond the Medicare Wellness Visit     Chief Complaint  Medicare Wellness-subsequent, URI (Head congestion for over a week. ), and Hyperlipidemia (Medication refills. )    Subjective   1) Medicare annual wellness, subsequent visit  _________________________________________    2) medication refills on the chronic comorbid conditions managed from the primary care standpoint:    -- Dyslipidemia: Patient just does diet and tries to stay as active as possible and has never been placed on a statin therapy    -- Prediabetes: Patient attempts to control with diet and staying as active as possible we will obtain A1c    -- Insomnia: Denies all SI/HI tolerating medication well with no side effects no issues reported overall stable receiving good efficacy    -- Vertigo/BPPV: Intermittently uses the meclizine for this and receives good results and overall stable and has not had flareups recently but does require refills as they are     -- Fibrotic lung disease with some COPD: Normally sees pulmonary Dr. Mcclellan and patient is requesting a refill on her albuterol inhaler until she sees him and reports that it has gotten worse and now she is requiring oxygen on a daily basis especially with any type of effort or exertion    -- Chronically fatigued-and did have a very low vitamin B-12 level in the past and we will obtain labs today  ___________________________________________    3) additionally the patient also wanted to request the once weekly injectables for weight loss as she was approved in the past and at that time it was completely unavailable and she never followed up regarding that she does have  "supplemental insurance of [employer] and they covered it and it was the Wegovy she denies having any personal or family history of medullary thyroid cancer or personal or family history of multiple endocrine neoplasia syndrome and she would like to attempt to do this again  ____________________________________________    4) headaches that are chronic, diagnosed as occipital neuralgia left-sided-does see neurology and has been receiving injections and she reports it is quite painful to get the injections and did not know if there was something else and reported that the injection she did receive was quite painful/uncomfortable      Suzanne is also being seen today for an annual adult preventative physical exam.  and Suzanne is also being seen today for additional medical problem/s.    Review of Systems   Constitutional:  Positive for fatigue.   HENT:  Negative for trouble swallowing.    Eyes:  Negative for visual disturbance.   Respiratory:  Positive for shortness of breath.         ILD chronically    Cardiovascular:  Negative for chest pain.   Gastrointestinal:  Negative for abdominal pain and blood in stool.   Endocrine: Positive for polyphagia. Negative for polydipsia and polyuria.   Genitourinary:  Negative for dysuria and vaginal bleeding.   Musculoskeletal:  Negative for back pain and neck pain.   Neurological:  Negative for dizziness, seizures, syncope and light-headedness.        Left posterior    Hematological:  Does not bruise/bleed easily.   Psychiatric/Behavioral:  Negative for self-injury and suicidal ideas.               Objective   Vital Signs:  /70   Pulse 93   Temp 97.3 °F (36.3 °C) (Temporal)   Ht 162.6 cm (64\")   Wt 99.8 kg (220 lb)   SpO2 95%   BMI 37.76 kg/m²   Physical Exam  Vitals and nursing note reviewed.   Constitutional:       Appearance: Normal appearance. She is obese.   HENT:      Head: Normocephalic.      Right Ear: External ear normal.      Left Ear: External ear normal.      Nose: " Nose normal.      Mouth/Throat:      Mouth: Mucous membranes are moist.   Eyes:      Pupils: Pupils are equal, round, and reactive to light.   Cardiovascular:      Rate and Rhythm: Normal rate and regular rhythm.      Heart sounds: Normal heart sounds.   Pulmonary:      Effort: Pulmonary effort is normal.      Breath sounds: Normal breath sounds.   Abdominal:      Palpations: Abdomen is soft.      Tenderness: There is no abdominal tenderness.   Musculoskeletal:         General: Normal range of motion.      Cervical back: Normal range of motion and neck supple.   Skin:     General: Skin is warm and dry.   Neurological:      Mental Status: She is alert and oriented to person, place, and time.   Psychiatric:         Mood and Affect: Mood normal.         Behavior: Behavior normal.         Thought Content: Thought content normal.         Judgment: Judgment normal.         The following data was reviewed by: ALBAN Carpio on 10/23/2024:   Procedure visit with Yasmeen Matamoros APRN (08/19/2024)      Assessment and Plan Additional age appropriate preventative wellness advice topics were discussed during today's preventative wellness exam(some topics already addressed during AWV portion of the note above):    Physical Activity: Advised cardiovascular activity 150 minutes per week as tolerated. (example brisk walk for 30 minutes, 5 days a week).     Nutrition: Discussed nutrition plan with patient. Information shared in after visit summary. Goal is for a well balanced diet to enhance overall health.     Healthy Weight: Discussed current and goal BMI with patient. Steps to attain this goal discussed. Information shared in after visit summary.              Medicare annual wellness visit, subsequent    Insomnia, unspecified type    Vertigo    Urge incontinence of urine    Fibrosis of lung    Lung disease    COPD mixed type    Mixed hyperlipidemia     Prediabetes    Vitamin B12 deficiency    Class 2 severe obesity  with serious comorbidity and body mass index (BMI) of 37.0 to 37.9 in adult, unspecified obesity type  Patient's (Body mass index is 37.76 kg/m².) indicates that they are obese (BMI >30) with health conditions that include impaired fasting glucose, dyslipidemias, osteoarthritis, and fibrotic lung Dz   . Weight is unchanged. BMI  is above average; BMI management plan is completed. We discussed portion control, increasing exercise, and pharmacologic options including wegovy .   Fatigue, unspecified type      Orders Placed This Encounter   Procedures    Comprehensive Metabolic Panel     Order Specific Question:   Release to patient     Answer:   Routine Release [[id number]]    Lipid Panel     Order Specific Question:   Release to patient     Answer:   Routine Release [[id number]]    TSH Rfx On Abnormal To Free T4     Order Specific Question:   Release to patient     Answer:   Routine Release [[id number]]    Urinalysis With Culture If Indicated -     Order Specific Question:   Release to patient     Answer:   Routine Release [[id number]]    Hemoglobin A1c     Order Specific Question:   Release to patient     Answer:   Routine Release [[id number]]    Vitamin B12 & Folate     Order Specific Question:   Release to patient     Answer:   Routine Release [[id number]]    CBC & Differential     Order Specific Question:   Manual Differential     Answer:   No     Order Specific Question:   Release to patient     Answer:   Routine Release [[id number]]     New Medications Ordered This Visit   Medications    amitriptyline (ELAVIL) 100 MG tablet     Sig: Take 1 tablet by mouth Every Night.     Dispense:  90 tablet     Refill:  1    meclizine (ANTIVERT) 25 MG tablet     Sig: Take 1 tablet by mouth 3 (Three) Times a Day As Needed for Dizziness.     Dispense:  90 tablet     Refill:  2    tolterodine LA (DETROL LA) 2 MG 24 hr capsule     Sig: Take 1 capsule by mouth Daily.     Dispense:  90 capsule     Refill:  1     ZERO refills remain  on this prescription. Your patient is requesting advance approval of refills for this medication to PREVENT ANY MISSED DOSES    albuterol sulfate  (90 Base) MCG/ACT inhaler     Sig: Inhale 2 puffs Every 6 (Six) Hours As Needed for Wheezing or Shortness of Air.     Dispense:  18 g     Refill:  0    Semaglutide-Weight Management (Wegovy) 0.25 MG/0.5ML solution auto-injector     Sig: Inject 0.5 mL under the skin into the appropriate area as directed 1 (One) Time Per Week.     Dispense:  2 mL     Refill:  0   Medication refills as noted above and labs as noted above    And did send in the courtesy refills on her albuterol until she sees pulmonary    And then also as a courtesy I went ahead and sent in the Wegovy and again since no longer so much of the shortage and she was approved in the past and she understands side effects risks and benefits and to follow-up monthly       Follow Up   Return in about 4 weeks (around 11/20/2024), or if symptoms worsen or fail to improve, for Recheck.  Patient was given instructions and counseling regarding her condition or for health maintenance advice. Please see specific information pulled into the AVS if appropriate.

## 2024-10-24 NOTE — PROGRESS NOTES
Please mail letter to patient stating    Ms. Gutierrez your hemoglobin A1c was still in the prediabetic range at 6.1% and normal is 5.6% or less and diabetes is diagnosed at 6.5% or higher and then once diabetic the goal is to remain less than 7%; and the cholesterol panel shows triglycerides and HDL were normal range and the LDL was modestly elevated at 124 and it should be less than 100 when fasting--and I did go ahead and calculate your cardiovascular risk for related event over the next 10 years and your estimated to be at risk of 12.5% risk and anyone at 5% or higher should be on some type of statin therapy or cholesterol medication and also do a low-cholesterol diet--let me know if you are willing to start 1 of these and I will send that in or we can discuss that at your follow-up visit    Blood counts were normal range and vitamin B12 and folic acid normal range and the comprehensive panel shows normal glucose and normal kidney and liver function test and normal electrolytes and the urinalysis was normal and the thyroid levels were normal

## 2024-10-29 ENCOUNTER — OFFICE VISIT (OUTPATIENT)
Dept: NEUROLOGY | Facility: CLINIC | Age: 74
End: 2024-10-29
Payer: MEDICARE

## 2024-10-29 VITALS
WEIGHT: 220 LBS | DIASTOLIC BLOOD PRESSURE: 70 MMHG | SYSTOLIC BLOOD PRESSURE: 128 MMHG | BODY MASS INDEX: 37.56 KG/M2 | OXYGEN SATURATION: 95 % | HEART RATE: 71 BPM | HEIGHT: 64 IN

## 2024-10-29 DIAGNOSIS — G40.009 PARTIAL IDIOPATHIC EPILEPSY WITH SEIZURES OF LOCALIZED ONSET, NOT INTRACTABLE, WITHOUT STATUS EPILEPTICUS: Primary | ICD-10-CM

## 2024-10-29 DIAGNOSIS — M54.81 OCCIPITAL NEURALGIA OF LEFT SIDE: ICD-10-CM

## 2024-10-29 PROCEDURE — 1159F MED LIST DOCD IN RCRD: CPT | Performed by: NURSE PRACTITIONER

## 2024-10-29 PROCEDURE — G2211 COMPLEX E/M VISIT ADD ON: HCPCS | Performed by: NURSE PRACTITIONER

## 2024-10-29 PROCEDURE — 1160F RVW MEDS BY RX/DR IN RCRD: CPT | Performed by: NURSE PRACTITIONER

## 2024-10-29 PROCEDURE — 99214 OFFICE O/P EST MOD 30 MIN: CPT | Performed by: NURSE PRACTITIONER

## 2024-10-29 RX ORDER — LEVETIRACETAM 500 MG/1
500 TABLET ORAL 2 TIMES DAILY
Qty: 180 TABLET | Refills: 3 | Status: SHIPPED | OUTPATIENT
Start: 2024-10-29

## 2024-10-29 NOTE — PROGRESS NOTES
"Chief Complaint  Follow-up and Seizures    Subjective          Suzanne Gutierrez presents to [facility]  History of Present Illness    History of Present Illness  The patient is a 74-year-old female who presents to the office for follow-up for seizure, remains on Keppra 500 mg twice daily.    She reports no recent seizures and continues to tolerate Keppra well. She experienced occipital pain, which was managed with a nerve block, but this led to dizziness. Currently, she feels improved, although she describes a sensation of numbness at the back of her head. She has discontinued Lyrica.       Objective   Vital Signs:   /70   Pulse 71   Ht 162.6 cm (64\")   Wt 99.8 kg (220 lb)   SpO2 95%   BMI 37.76 kg/m²     Physical Exam  HENT:      Head: Normocephalic.   Pulmonary:      Effort: Pulmonary effort is normal.   Neurological:      Mental Status: She is alert and oriented to person, place, and time.      Sensory: Sensation is intact.      Motor: Motor function is intact.      Coordination: Coordination is intact.      Deep Tendon Reflexes: Reflexes are normal and symmetric.        Neurological Exam  Mental Status  Alert. Oriented to person, place, and time.    Sensory  Normal sensation.    Reflexes  Deep tendon reflexes are 2+ and symmetric in all four extremities.    Coordination    Finger-to-nose, rapid alternating movements and heel-to-shin normal bilaterally without dysmetria.      Result Review :               Assessment and Plan    Diagnoses and all orders for this visit:    1. Partial idiopathic epilepsy with seizures of localized onset, not intractable, without status epilepticus (Primary)    2. Occipital neuralgia of left side    Other orders  -     levETIRAcetam (KEPPRA) 500 MG tablet; Take 1 tablet by mouth 2 (Two) Times a Day.  Dispense: 180 tablet; Refill: 3        Assessment & Plan  1. Seizure.  Her condition is stable with no recent seizure episodes. She " remains on Keppra 500 mg twice daily. A refill of Keppra has been provided to maintain seizure control. If any issues arise, she is advised to contact the office immediately. There is no need for additional monitoring or brain imaging unless her condition changes.    2. Occipital pain.  She experienced significant vertigo following a nerve block but is now feeling better. The back of her head remains numb, which is an acceptable outcome. She is currently on amitriptyline 100 mg for nerve pain and is not taking Lyrica due to its cognitive side effects. If the nerve pain recurs, potential treatments include restarting Lyrica, trying gabapentin, or considering a short course of steroids. She is advised to report any recurrence of symptoms for further evaluation and treatment.    Follow-up  Return in 1 year for Keppra refills and seizure follow-up.         Follow Up   Return for seizure f/u.  Patient was given instructions and counseling regarding her condition or for health maintenance advice. Please see specific information pulled into the AVS if appropriate.       Patient or patient representative verbalized consent for the use of Ambient Listening during the visit with  ALBAN Hay for chart documentation. 10/30/2024  11:11 EDT

## 2024-11-22 DIAGNOSIS — J44.9 COPD MIXED TYPE: ICD-10-CM

## 2024-11-22 DIAGNOSIS — J84.10 FIBROSIS OF LUNG: ICD-10-CM

## 2024-11-22 DIAGNOSIS — J98.4 LUNG DISEASE: ICD-10-CM

## 2024-11-22 RX ORDER — ALBUTEROL SULFATE 90 UG/1
2 INHALANT RESPIRATORY (INHALATION) EVERY 6 HOURS PRN
Qty: 8.5 G | Refills: 0 | Status: SHIPPED | OUTPATIENT
Start: 2024-11-22

## 2024-12-10 ENCOUNTER — OFFICE VISIT (OUTPATIENT)
Dept: FAMILY MEDICINE CLINIC | Facility: CLINIC | Age: 74
End: 2024-12-10
Payer: MEDICARE

## 2024-12-10 VITALS
WEIGHT: 215 LBS | SYSTOLIC BLOOD PRESSURE: 122 MMHG | BODY MASS INDEX: 36.7 KG/M2 | OXYGEN SATURATION: 95 % | HEART RATE: 95 BPM | TEMPERATURE: 97.5 F | DIASTOLIC BLOOD PRESSURE: 76 MMHG | HEIGHT: 64 IN

## 2024-12-10 DIAGNOSIS — E66.01 CLASS 2 SEVERE OBESITY WITH SERIOUS COMORBIDITY AND BODY MASS INDEX (BMI) OF 36.0 TO 36.9 IN ADULT, UNSPECIFIED OBESITY TYPE: Primary | ICD-10-CM

## 2024-12-10 DIAGNOSIS — E66.812 CLASS 2 SEVERE OBESITY WITH SERIOUS COMORBIDITY AND BODY MASS INDEX (BMI) OF 36.0 TO 36.9 IN ADULT, UNSPECIFIED OBESITY TYPE: Primary | ICD-10-CM

## 2024-12-10 DIAGNOSIS — I83.93 VARICOSE VEINS OF BOTH LOWER EXTREMITIES, UNSPECIFIED WHETHER COMPLICATED: ICD-10-CM

## 2024-12-10 DIAGNOSIS — K59.00 CONSTIPATION, UNSPECIFIED CONSTIPATION TYPE: ICD-10-CM

## 2024-12-10 PROCEDURE — 1159F MED LIST DOCD IN RCRD: CPT | Performed by: NURSE PRACTITIONER

## 2024-12-10 PROCEDURE — 1160F RVW MEDS BY RX/DR IN RCRD: CPT | Performed by: NURSE PRACTITIONER

## 2024-12-10 PROCEDURE — 1125F AMNT PAIN NOTED PAIN PRSNT: CPT | Performed by: NURSE PRACTITIONER

## 2024-12-10 PROCEDURE — 99214 OFFICE O/P EST MOD 30 MIN: CPT | Performed by: NURSE PRACTITIONER

## 2024-12-10 PROCEDURE — G2211 COMPLEX E/M VISIT ADD ON: HCPCS | Performed by: NURSE PRACTITIONER

## 2024-12-10 RX ORDER — MEDICAL SUPPLY, MISCELLANEOUS
2 EACH MISCELLANEOUS DAILY
Qty: 2 EACH | Refills: 0 | Status: SHIPPED | OUTPATIENT
Start: 2024-12-10

## 2024-12-10 RX ORDER — LUBIPROSTONE 8 UG/1
8 CAPSULE ORAL 2 TIMES DAILY WITH MEALS
Qty: 180 CAPSULE | Refills: 1 | Status: SHIPPED | OUTPATIENT
Start: 2024-12-10

## 2024-12-10 NOTE — PROGRESS NOTES
Chief Complaint  Weight Check (Follow up on wegovy)    Subjective            Suzanne Gutierrez presents to [facility]  History of Present Illness  Patient is here today for follow-up regarding the Wegovy she started at 0.25 mg once weekly tolerated very well she takes this every Saturday and by Thursday she reports she starts getting very hungry with a lot of breakthrough symptoms no lumps in the throat no nausea vomiting diarrhea no abdominal pain no vision changes no dizziness lightheadedness no syncope but does report some constipation and has utilized over-the-counter methods and still remains constipated    And then also she has noticed for the past couple of months that her varicose veins in the bilateral lower leg in the calf areas have been more prominent and more pronounced and she wanted those evaluated as the left looks a little more prominent than the right but no pain in the calf and no pain with walking but at times they do sting       PHQ-2 Total Score:    PHQ-9 Total Score:      Past Medical History:   Diagnosis Date    Diverticulitis     Insomnia     Oxygen dependent     wears 02 at hs only    Pre-diabetes     Pulmonary fibrosis     Seizures     Sleep apnea     Vertigo     Vertigo        Allergies   Allergen Reactions    Cephalexin Itching    Excedrin Migraine [Asa-Apap-Caff Buffered] GI Intolerance    Gabapentin Unknown - High Severity    Naproxen Unknown - High Severity        Past Surgical History:   Procedure Laterality Date    APPENDECTOMY      CHOLECYSTECTOMY      COLONOSCOPY N/A 5/2/2022    Procedure: COLONOSCOPY WITH POLYPECTOMIES, BX;  Surgeon: Nicko Aranda MD;  Location: [facility] ENDOSCOPY;  Service: Gastroenterology;  Laterality: N/A;  COLON POLYPS, DIVERTICULOSIS, HEMORRHOIDS, RECTAL ULCER    HYSTERECTOMY      TONSILLECTOMY      TUBAL ABDOMINAL LIGATION          Social History     Tobacco Use    Smoking status: Former     Current packs/day: 0.00      Average packs/day: 1 pack/day for 30.0 years (30.0 ttl pk-yrs)     Types: Cigarettes     Start date: 1974     Quit date: 2004     Years since quittin.4    Smokeless tobacco: Never   Vaping Use    Vaping status: Never Used   Substance Use Topics    Alcohol use: Never    Drug use: Never       Family History   Problem Relation Age of Onset    Alcohol abuse Father     Prostate cancer Brother     Breast cancer Sister     Breast cancer Maternal Aunt     Colon cancer Neg Hx     Ovarian cancer Neg Hx     Uterine cancer Neg Hx         There are no preventive care reminders to display for this patient.     Current Outpatient Medications on File Prior to Visit   Medication Sig    albuterol sulfate  (90 Base) MCG/ACT inhaler INHALE 2 PUFFS BY MOUTH EVERY 6 HOURS AS NEEDED FOR WHEEZING OR SHORTNESS OF AIR    amitriptyline (ELAVIL) 100 MG tablet Take 1 tablet by mouth Every Night.    aspirin 81 MG chewable tablet aspirin 81 mg oral tablet,chewable chew 1 tablet (81 mg) by oral route once daily   Active    Esbriet 267 MG capsule     Fluticasone-Umeclidin-Vilant (Trelegy Ellipta) 100-62.5-25 MCG/INH inhaler Inhale 1 puff Daily.    ipratropium-albuterol (DUO-NEB) 0.5-2.5 mg/3 ml nebulizer INHALE 3 ML VIA NEBULIZER THREE TIMES A DAY AS NEEDED FOR WHEEZING OR SHORTNESS OF AIR    levETIRAcetam (KEPPRA) 500 MG tablet Take 1 tablet by mouth 2 (Two) Times a Day.    meclizine (ANTIVERT) 25 MG tablet Take 1 tablet by mouth 3 (Three) Times a Day As Needed for Dizziness.    tolterodine LA (DETROL LA) 2 MG 24 hr capsule Take 1 capsule by mouth Daily.    vitamin D3 125 MCG (5000 UT) capsule capsule cholecalciferol (vitamin D3) 125 mcg (5,000 unit) oral capsule take 1 capsule by oral route daily   Active    [DISCONTINUED] Semaglutide-Weight Management (Wegovy) 0.25 MG/0.5ML solution auto-injector Inject 0.5 mL under the skin into the appropriate area as directed 1 (One) Time Per Week.    [DISCONTINUED] pregabalin (LYRICA) 100  "MG capsule Take 1 capsule by mouth 2 (Two) Times a Day. (Patient not taking: Reported on 12/10/2024)     No current facility-administered medications on file prior to visit.       Immunization History   Administered Date(s) Administered    ABRYSVO (RSV, 60+ or pregnant women 32-36 wks) 10/17/2023    COVID-19 (MODERNA) 12YRS+ (SPIKEVAX) 10/17/2023, 10/14/2024    COVID-19 (MODERNA) 1st,2nd,3rd Dose Monovalent 12/15/2021    COVID-19 (MODERNA) Monovalent Original Booster 12/15/2021    COVID-19 (PFIZER) BIVALENT 12+YRS 11/03/2022    COVID-19 (PFIZER) Purple Cap Monovalent 05/07/2021, 06/01/2021    Fluad Quad 65+ 11/03/2022    Fluzone  >6mos 09/12/2014    Fluzone High-Dose 65+YRS 10/14/2024    Fluzone High-Dose 65+yrs 10/02/2021, 09/21/2023    PPD Test 07/15/2021, 07/19/2023    Pneumococcal Conjugate 13-Valent (PCV13) 06/19/2017    Pneumococcal Conjugate 20-Valent (PCV20) 12/14/2023    Pneumococcal Polysaccharide (PPSV23) 07/03/2019    Shingrix 09/21/2023, 04/16/2024    Tdap 04/06/2022, 12/14/2023    Zostavax 06/19/2017, 07/03/2019       Review of Systems   Constitutional:  Negative for unexpected weight gain and unexpected weight loss.   HENT:  Negative for trouble swallowing.    Eyes:  Negative for blurred vision and double vision.   Cardiovascular:  Negative for chest pain.   Gastrointestinal:  Positive for constipation. Negative for abdominal pain, blood in stool, diarrhea, nausea and vomiting.        Using the OTC fiber gels and still having issues    Genitourinary:  Negative for dysuria.   Neurological:  Negative for dizziness, syncope and light-headedness.   Hematological:  Negative for adenopathy.   Psychiatric/Behavioral: Negative.          Objective     /76 (BP Location: Right arm)   Pulse 95   Temp 97.5 °F (36.4 °C) (Temporal)   Ht 162.6 cm (64\")   Wt 97.5 kg (215 lb)   SpO2 95%   BMI 36.90 kg/m²       Physical Exam  Vitals and nursing note reviewed.   Constitutional:       Appearance: Normal " appearance.   HENT:      Head: Normocephalic.      Right Ear: External ear normal.      Left Ear: External ear normal.      Nose: Nose normal.      Mouth/Throat:      Mouth: Mucous membranes are moist.   Eyes:      Pupils: Pupils are equal, round, and reactive to light.   Cardiovascular:      Rate and Rhythm: Normal rate and regular rhythm.      Heart sounds: Normal heart sounds.      Comments: BLE with large varicose veins noted to the inner posterior calf areas bilat with the left worse than right--(-) homans    Pulmonary:      Effort: Pulmonary effort is normal.      Breath sounds: Normal breath sounds.   Abdominal:      Palpations: Abdomen is soft.      Tenderness: There is no abdominal tenderness.   Musculoskeletal:         General: Normal range of motion.      Cervical back: Normal range of motion and neck supple.   Skin:     General: Skin is warm and dry.   Neurological:      Mental Status: She is alert and oriented to person, place, and time.   Psychiatric:         Mood and Affect: Mood normal.         Behavior: Behavior normal.         Thought Content: Thought content normal.         Judgment: Judgment normal.         Result Review :                           Assessment and Plan      Diagnoses and all orders for this visit:    1. Class 2 severe obesity with serious comorbidity and body mass index (BMI) of 36.0 to 36.9 in adult, unspecified obesity type (Primary)  -     Semaglutide-Weight Management 0.5 MG/0.5ML solution auto-injector; Inject 0.5 mL under the skin into the appropriate area as directed 1 (One) Time Per Week.  Dispense: 2 mL; Refill: 0    2. Constipation, unspecified constipation type  -     lubiprostone (Amitiza) 8 MCG capsule; Take 1 capsule by mouth 2 (Two) Times a Day With Meals.  Dispense: 180 capsule; Refill: 1    3. Varicose veins of both lower extremities, unspecified whether complicated  Comments:  recommended the compression stocking  Orders:  -     Elastic Bandages & Supports  (T.E.D. Below Knee/L X-Lgth) misc; Use 2 each Daily.  Dispense: 2 each; Refill: 0    Increased the Wegovy generic from 0.25 mg once weekly to 0.5 mg weekly and she will follow-up at 4 weeks    Regarding the constipation we will see if [facility] carries the Amitiza and she can utilize this    And then regarding the varicose veins I did tell her the best recommendation is compression stockings to be worn during the day and that we could always ultimately refer her to vascular but that they would want to know that she is at least try the compression stockings first        Follow Up     Return in about 4 weeks (around 1/7/2025), or if symptoms worsen or fail to improve, for Recheck.    Patient was given instructions and counseling regarding her condition or for health maintenance advice. Please see specific information pulled into the AVS if appropriate.            Suzanne Gutierrez  reports that she quit smoking about 20 years ago. Her smoking use included cigarettes. She started smoking about 50 years ago. She has a 30 pack-year smoking history. She has never used smokeless tobacco. I have educated her on the risk of diseases from using tobacco products such as cancer, COPD, and heart disease.

## 2024-12-31 ENCOUNTER — TELEPHONE (OUTPATIENT)
Dept: FAMILY MEDICINE CLINIC | Facility: CLINIC | Age: 74
End: 2024-12-31

## 2024-12-31 DIAGNOSIS — J84.10 FIBROSIS OF LUNG: ICD-10-CM

## 2024-12-31 DIAGNOSIS — J98.4 LUNG DISEASE: ICD-10-CM

## 2024-12-31 DIAGNOSIS — J44.9 COPD MIXED TYPE: Primary | ICD-10-CM

## 2024-12-31 NOTE — TELEPHONE ENCOUNTER
Caller: Suzanne Gutierrez    Relationship: Self    Best call back number: [phone]     What medication are you requesting: Fluticasone-Umeclidin-Vilant (Trelegy Ellipta) 100-62.5-25 MCG/INH inhaler       If a prescription is needed, what is your preferred pharmacy and phone number: [facility] - [address] [phone] [facility] [phone] [facility]     Additional notes: PATIENT ALMOST OUT OF MEDICATION AND STATES PROVIDER HERNANDEZ PRESCRIBES.        CONTACT PATIENT WHEN COMPLETE.

## 2025-01-10 DIAGNOSIS — J98.4 LUNG DISEASE: ICD-10-CM

## 2025-01-10 DIAGNOSIS — J84.10 FIBROSIS OF LUNG: ICD-10-CM

## 2025-01-10 DIAGNOSIS — J44.9 COPD MIXED TYPE: ICD-10-CM

## 2025-01-13 ENCOUNTER — OFFICE VISIT (OUTPATIENT)
Dept: FAMILY MEDICINE CLINIC | Facility: CLINIC | Age: 75
End: 2025-01-13
Payer: MEDICARE

## 2025-01-13 VITALS
HEIGHT: 64 IN | HEART RATE: 96 BPM | SYSTOLIC BLOOD PRESSURE: 132 MMHG | DIASTOLIC BLOOD PRESSURE: 82 MMHG | TEMPERATURE: 97.1 F | WEIGHT: 209 LBS | OXYGEN SATURATION: 91 % | BODY MASS INDEX: 35.68 KG/M2

## 2025-01-13 DIAGNOSIS — J44.9 COPD MIXED TYPE: ICD-10-CM

## 2025-01-13 DIAGNOSIS — J01.00 ACUTE NON-RECURRENT MAXILLARY SINUSITIS: ICD-10-CM

## 2025-01-13 DIAGNOSIS — E66.812 CLASS 2 SEVERE OBESITY WITH SERIOUS COMORBIDITY AND BODY MASS INDEX (BMI) OF 35.0 TO 35.9 IN ADULT, UNSPECIFIED OBESITY TYPE: Primary | ICD-10-CM

## 2025-01-13 DIAGNOSIS — R51.9 NONINTRACTABLE HEADACHE, UNSPECIFIED CHRONICITY PATTERN, UNSPECIFIED HEADACHE TYPE: ICD-10-CM

## 2025-01-13 DIAGNOSIS — H65.03 NON-RECURRENT ACUTE SEROUS OTITIS MEDIA OF BOTH EARS: ICD-10-CM

## 2025-01-13 DIAGNOSIS — J84.10 FIBROSIS OF LUNG: ICD-10-CM

## 2025-01-13 DIAGNOSIS — R68.83 CHILLS: ICD-10-CM

## 2025-01-13 DIAGNOSIS — J98.4 LUNG DISEASE: ICD-10-CM

## 2025-01-13 DIAGNOSIS — E66.01 CLASS 2 SEVERE OBESITY WITH SERIOUS COMORBIDITY AND BODY MASS INDEX (BMI) OF 35.0 TO 35.9 IN ADULT, UNSPECIFIED OBESITY TYPE: Primary | ICD-10-CM

## 2025-01-13 DIAGNOSIS — Z20.822 EXPOSURE TO COVID-19 VIRUS: ICD-10-CM

## 2025-01-13 DIAGNOSIS — Z20.828 EXPOSURE TO RESPIRATORY SYNCYTIAL VIRUS (RSV): ICD-10-CM

## 2025-01-13 LAB
EXPIRATION DATE: NORMAL
FLUAV AG UPPER RESP QL IA.RAPID: NOT DETECTED
FLUBV AG UPPER RESP QL IA.RAPID: NOT DETECTED
INTERNAL CONTROL: NORMAL
Lab: NORMAL
RSV RNA NPH QL NAA+NON-PROBE: NOT DETECTED
SARS-COV-2 AG UPPER RESP QL IA.RAPID: NOT DETECTED

## 2025-01-13 PROCEDURE — 1159F MED LIST DOCD IN RCRD: CPT | Performed by: NURSE PRACTITIONER

## 2025-01-13 PROCEDURE — 99214 OFFICE O/P EST MOD 30 MIN: CPT | Performed by: NURSE PRACTITIONER

## 2025-01-13 PROCEDURE — 87634 RSV DNA/RNA AMP PROBE: CPT | Performed by: NURSE PRACTITIONER

## 2025-01-13 PROCEDURE — 1160F RVW MEDS BY RX/DR IN RCRD: CPT | Performed by: NURSE PRACTITIONER

## 2025-01-13 PROCEDURE — 1125F AMNT PAIN NOTED PAIN PRSNT: CPT | Performed by: NURSE PRACTITIONER

## 2025-01-13 PROCEDURE — G2211 COMPLEX E/M VISIT ADD ON: HCPCS | Performed by: NURSE PRACTITIONER

## 2025-01-13 PROCEDURE — 87428 SARSCOV & INF VIR A&B AG IA: CPT | Performed by: NURSE PRACTITIONER

## 2025-01-13 RX ORDER — METHYLPREDNISOLONE 4 MG/1
TABLET ORAL
Qty: 21 EACH | Refills: 0 | Status: SHIPPED | OUTPATIENT
Start: 2025-01-13

## 2025-01-13 RX ORDER — AMOXICILLIN 875 MG/1
875 TABLET, COATED ORAL 2 TIMES DAILY
Qty: 20 TABLET | Refills: 0 | Status: SHIPPED | OUTPATIENT
Start: 2025-01-13

## 2025-01-13 NOTE — PROGRESS NOTES
Chief Complaint  Obesity (Medication refills. ) and Chills (Chills, headache started this weekend. )    Subjective            Suzanne Gutierrez presents to [facility]  History of Present Illness  1) patient is here for her weight management follow-up visit-1 month [id number] started Wegovy 0.25 mg 2 months ago tolerated very well and then we increased at the last visit to 0.5 mg once weekly and she is here today for the 1 month follow-up visit she actually started with her highest weight weighing in at 224 pounds and has lost a total of 15 pounds    2) patient is also here for chills and headache acute onset symptoms that just started this weekend    --also of note patient's daughter reached out to me over the weekend stating that she had been out of her Trelegy inhaler for 1 to 2 weeks and that [employer] is giving the patient to run around so she would like for the trilogy inhaler to be sent locally if possible--and I did that for her and the daughter reported back that it was far too expensive and that we will we would have to resend to [employer]      History of Present Illness  The patient presents for evaluation of otitis media, sinusitis, and weight management.    She reports experiencing headaches characterized by a sensation of pressure on her head, accompanied by ear pain. She also notes fatigue, fever, chills, and diarrhea. She does not experience any dizziness, lightheadedness, or syncope associated with the headache. She has been experiencing body aches since Friday. She does not report any cough or wheezing. She has been unable to obtain Trelegy due to its high cost of $ 700. She has been attempting to contact her pulmonologist but has been unsuccessful as he is currently out of town. She typically requires a steroid pack when her symptoms worsen. She has not required steroids recently. She believes amoxicillin is effective for her symptoms.    She has lost an additional  6 pounds with the use of Wegovy, which she tolerates well.    MEDICATIONS  Current: Wegovy  Past: Augmentin      PHQ-2 Total Score: 0  PHQ-9 Total Score:      Past Medical History:   Diagnosis Date    Diverticulitis     Insomnia     Oxygen dependent     wears 02 at hs only    Pre-diabetes     Pulmonary fibrosis     Seizures     Sleep apnea     Vertigo     Vertigo        Allergies   Allergen Reactions    Cephalexin Itching    Excedrin Migraine [Asa-Apap-Caff Buffered] GI Intolerance    Gabapentin Unknown - High Severity    Naproxen Unknown - High Severity        Past Surgical History:   Procedure Laterality Date    APPENDECTOMY      CHOLECYSTECTOMY      COLONOSCOPY N/A 2022    Procedure: COLONOSCOPY WITH POLYPECTOMIES, BX;  Surgeon: Nicko Aranda MD;  Location: [facility] ENDOSCOPY;  Service: Gastroenterology;  Laterality: N/A;  COLON POLYPS, DIVERTICULOSIS, HEMORRHOIDS, RECTAL ULCER    HYSTERECTOMY      TONSILLECTOMY      TUBAL ABDOMINAL LIGATION          Social History     Tobacco Use    Smoking status: Former     Current packs/day: 0.00     Average packs/day: 1 pack/day for 30.0 years (30.0 ttl pk-yrs)     Types: Cigarettes     Start date: 1974     Quit date: 2004     Years since quittin.5    Smokeless tobacco: Never   Vaping Use    Vaping status: Never Used   Substance Use Topics    Alcohol use: Never    Drug use: Never       Family History   Problem Relation Age of Onset    Alcohol abuse Father     Prostate cancer Brother     Breast cancer Sister     Breast cancer Maternal Aunt     Colon cancer Neg Hx     Ovarian cancer Neg Hx     Uterine cancer Neg Hx         There are no preventive care reminders to display for this patient.     Current Outpatient Medications on File Prior to Visit   Medication Sig    albuterol sulfate  (90 Base) MCG/ACT inhaler INHALE 2 PUFFS BY MOUTH EVERY 6 HOURS AS NEEDED FOR WHEEZING OR SHORTNESS OF AIR    amitriptyline (ELAVIL) 100 MG tablet Take 1 tablet by  mouth Every Night.    aspirin 81 MG chewable tablet aspirin 81 mg oral tablet,chewable chew 1 tablet (81 mg) by oral route once daily   Active    Elastic Bandages & Supports (T.E.D. Below Knee/L X-Lgth) misc Use 2 each Daily.    Esbriet 267 MG capsule     ipratropium-albuterol (DUO-NEB) 0.5-2.5 mg/3 ml nebulizer INHALE 3 ML VIA NEBULIZER THREE TIMES A DAY AS NEEDED FOR WHEEZING OR SHORTNESS OF AIR    levETIRAcetam (KEPPRA) 500 MG tablet Take 1 tablet by mouth 2 (Two) Times a Day.    lubiprostone (Amitiza) 8 MCG capsule Take 1 capsule by mouth 2 (Two) Times a Day With Meals.    meclizine (ANTIVERT) 25 MG tablet Take 1 tablet by mouth 3 (Three) Times a Day As Needed for Dizziness.    tolterodine LA (DETROL LA) 2 MG 24 hr capsule Take 1 capsule by mouth Daily.    vitamin D3 125 MCG (5000 UT) capsule capsule cholecalciferol (vitamin D3) 125 mcg (5,000 unit) oral capsule take 1 capsule by oral route daily   Active    [DISCONTINUED] Fluticasone-Umeclidin-Vilant (Trelegy Ellipta) 100-62.5-25 MCG/ACT inhaler Inhale 1 puff Daily.    [DISCONTINUED] Semaglutide-Weight Management 0.5 MG/0.5ML solution auto-injector Inject 0.5 mL under the skin into the appropriate area as directed 1 (One) Time Per Week.     No current facility-administered medications on file prior to visit.       Immunization History   Administered Date(s) Administered    ABRYSVO (RSV, 60+ or pregnant women 32-36 wks) 10/17/2023    COVID-19 (MODERNA) 12YRS+ (SPIKEVAX) 10/17/2023, 10/14/2024    COVID-19 (MODERNA) 1st,2nd,3rd Dose Monovalent 12/15/2021    COVID-19 (MODERNA) Monovalent Original Booster 12/15/2021    COVID-19 (PFIZER) BIVALENT 12+YRS 11/03/2022    COVID-19 (PFIZER) Purple Cap Monovalent 05/07/2021, 06/01/2021    Fluad Quad 65+ 11/03/2022    Fluzone  >6mos 09/12/2014    Fluzone High-Dose 65+YRS 10/14/2024    Fluzone High-Dose 65+yrs 10/02/2021, 09/21/2023    PPD Test 07/15/2021, 07/19/2023    Pneumococcal Conjugate 13-Valent (PCV13) 06/19/2017     "Pneumococcal Conjugate 20-Valent (PCV20) 12/14/2023    Pneumococcal Polysaccharide (PPSV23) 07/03/2019    Shingrix 09/21/2023, 04/16/2024    Tdap 04/06/2022, 12/14/2023    Zostavax 06/19/2017, 07/03/2019       Review of Systems   Constitutional:  Positive for chills and fatigue. Negative for fever.   HENT:  Positive for ear pain, postnasal drip, rhinorrhea and sinus pressure.    Eyes: Negative.    Respiratory:  Positive for shortness of breath. Negative for cough and wheezing.    Cardiovascular:  Negative for chest pain.   Gastrointestinal:  Positive for diarrhea. Negative for nausea and vomiting.   Genitourinary: Negative.    Musculoskeletal:  Positive for arthralgias.   Neurological:  Positive for headache. Negative for dizziness, syncope and light-headedness.   Psychiatric/Behavioral: Negative.          Objective     /82   Pulse 96   Temp 97.1 °F (36.2 °C) (Temporal)   Ht 162.6 cm (64\")   Wt 94.8 kg (209 lb)   SpO2 91%   BMI 35.87 kg/m²       Physical Exam  Vitals and nursing note reviewed.   Constitutional:       Appearance: Normal appearance.   HENT:      Head: Normocephalic.      Right Ear: Ear canal and external ear normal. Tympanic membrane is injected and erythematous.      Left Ear: Ear canal and external ear normal. Tympanic membrane is injected and erythematous.      Nose:      Right Sinus: Maxillary sinus tenderness present.      Left Sinus: Maxillary sinus tenderness present.      Mouth/Throat:      Mouth: Mucous membranes are moist.   Eyes:      Pupils: Pupils are equal, round, and reactive to light.   Cardiovascular:      Rate and Rhythm: Normal rate and regular rhythm.      Heart sounds: Normal heart sounds.   Pulmonary:      Effort: Pulmonary effort is normal.      Breath sounds: Normal breath sounds.   Abdominal:      Palpations: Abdomen is soft.   Musculoskeletal:         General: Normal range of motion.      Cervical back: Normal range of motion and neck supple.   Skin:     General: " Skin is warm and dry.   Neurological:      Mental Status: She is alert and oriented to person, place, and time.   Psychiatric:         Mood and Affect: Mood normal.         Behavior: Behavior normal.         Thought Content: Thought content normal.         Judgment: Judgment normal.         Result Review :                  POCT SARS-CoV-2 Antigen EMILY + Flu (01/13/2025 11:57)   Results                 Assessment and Plan      Diagnoses and all orders for this visit:    1. Class 2 severe obesity with serious comorbidity and body mass index (BMI) of 35.0 to 35.9 in adult, unspecified obesity type (Primary)  -     Semaglutide-Weight Management 1 MG/0.5ML solution auto-injector; Inject 0.5 mL under the skin into the appropriate area as directed 1 (One) Time Per Week.  Dispense: 2 mL; Refill: 0    2. Fibrosis of lung  -     Fluticasone-Umeclidin-Vilant (Trelegy Ellipta) 100-62.5-25 MCG/ACT inhaler; Inhale 1 puff Daily.  Dispense: 90 each; Refill: 1    3. Lung disease  -     Fluticasone-Umeclidin-Vilant (Trelegy Ellipta) 100-62.5-25 MCG/ACT inhaler; Inhale 1 puff Daily.  Dispense: 90 each; Refill: 1    4. COPD mixed type  -     Fluticasone-Umeclidin-Vilant (Trelegy Ellipta) 100-62.5-25 MCG/ACT inhaler; Inhale 1 puff Daily.  Dispense: 90 each; Refill: 1    5. Non-recurrent acute serous otitis media of both ears  -     methylPREDNISolone (MEDROL) 4 MG dose pack; Take as directed on package instructions.  Dispense: 21 each; Refill: 0  -     amoxicillin (AMOXIL) 875 MG tablet; Take 1 tablet by mouth 2 (Two) Times a Day.  Dispense: 20 tablet; Refill: 0    6. Acute non-recurrent maxillary sinusitis  -     methylPREDNISolone (MEDROL) 4 MG dose pack; Take as directed on package instructions.  Dispense: 21 each; Refill: 0  -     amoxicillin (AMOXIL) 875 MG tablet; Take 1 tablet by mouth 2 (Two) Times a Day.  Dispense: 20 tablet; Refill: 0    7. Chills  -     POCT SARS-CoV-2 Antigen EMILY + Flu  -     RSV PCR - Swab, Nasopharynx    8.  Nonintractable headache, unspecified chronicity pattern, unspecified headache type  -     POCT SARS-CoV-2 Antigen EMILY + Flu  -     RSV PCR - Swab, Nasopharynx    9. Exposure to COVID-19 virus  -     POCT SARS-CoV-2 Antigen EMILY + Flu  -     RSV PCR - Swab, Nasopharynx    10. Exposure to respiratory syncytial virus (RSV)  -     RSV PCR - Swab, Nasopharynx      Negative for flu and COVID sending out RSV    Assessment & Plan  1. Otitis media.  She reports ear pain and sinus pressure, with one ear appearing red upon examination. A prescription for high-dose amoxicillin will be sent to [facility]. A steroid pack will also be provided to manage her symptoms.    2. Sinusitis.  She reports sinus pressure and headache starting from Friday. The maxillary area is tender upon examination. A prescription for high-dose amoxicillin will be sent to [facility]. A steroid pack will also be provided to manage her symptoms.    3. Respiratory syncytial virus (RSV) exposure.  She reports symptoms including headache, ear pain, fatigue, fever, chills, and diarrhea. Her oxygen saturation is 91%, lower than her usual 95%. An RSV swab will be conducted today to confirm the diagnosis.    4. Weight management.  She has successfully lost 15 pounds, reducing her BMI from 36 to 35. She is tolerating the current medication well. The dosage of Wegovy will be increased to 1 mg to further assist with weight loss.    Follow-up  The patient will follow up in 1 month.          Follow Up     Return in about 4 weeks (around 2/10/2025), or if symptoms worsen or fail to improve, for Recheck.    Patient was given instructions and counseling regarding her condition or for health maintenance advice. Please see specific information pulled into the AVS if appropriate.            Suzanne Gutierrez  reports that she quit smoking about 20 years ago. Her smoking use included cigarettes. She started smoking about 50 years ago. She has a 30 pack-year smoking history. She has  never used smokeless tobacco. I have educated her on the risk of diseases from using tobacco products such as cancer, COPD, and heart disease.              Patient or patient representative verbalized consent for the use of Ambient Listening during the visit with  ALBAN Carpio for chart documentation. 1/13/2025  12:18 EST

## 2025-01-27 ENCOUNTER — TELEPHONE (OUTPATIENT)
Dept: FAMILY MEDICINE CLINIC | Facility: CLINIC | Age: 75
End: 2025-01-27

## 2025-01-27 DIAGNOSIS — Z12.31 SCREENING MAMMOGRAM FOR BREAST CANCER: Primary | ICD-10-CM

## 2025-01-27 NOTE — TELEPHONE ENCOUNTER
Caller: Suzanne Gutierrez    Relationship to patient: Self    Best call back number: [phone]    Patient is needing: PATIENT CALLED IN AND IS REQUESTING A MAMMOGRAM ORDER PLEASE. PATIENT SAID THAT IT IS OKAY TO LEAVE MESSAGE ON PHONE WHEN ORDER IS PLACED

## 2025-02-10 ENCOUNTER — OFFICE VISIT (OUTPATIENT)
Dept: FAMILY MEDICINE CLINIC | Facility: CLINIC | Age: 75
End: 2025-02-10
Payer: MEDICARE

## 2025-02-10 VITALS
BODY MASS INDEX: 35.68 KG/M2 | SYSTOLIC BLOOD PRESSURE: 122 MMHG | DIASTOLIC BLOOD PRESSURE: 77 MMHG | HEIGHT: 64 IN | WEIGHT: 209 LBS | HEART RATE: 86 BPM | OXYGEN SATURATION: 93 %

## 2025-02-10 DIAGNOSIS — E66.01 CLASS 2 SEVERE OBESITY WITH SERIOUS COMORBIDITY AND BODY MASS INDEX (BMI) OF 35.0 TO 35.9 IN ADULT, UNSPECIFIED OBESITY TYPE: Primary | ICD-10-CM

## 2025-02-10 DIAGNOSIS — G47.00 INSOMNIA, UNSPECIFIED TYPE: ICD-10-CM

## 2025-02-10 DIAGNOSIS — K59.00 CONSTIPATION, UNSPECIFIED CONSTIPATION TYPE: ICD-10-CM

## 2025-02-10 DIAGNOSIS — E66.812 CLASS 2 SEVERE OBESITY WITH SERIOUS COMORBIDITY AND BODY MASS INDEX (BMI) OF 35.0 TO 35.9 IN ADULT, UNSPECIFIED OBESITY TYPE: Primary | ICD-10-CM

## 2025-02-10 PROCEDURE — 1160F RVW MEDS BY RX/DR IN RCRD: CPT | Performed by: NURSE PRACTITIONER

## 2025-02-10 PROCEDURE — 99214 OFFICE O/P EST MOD 30 MIN: CPT | Performed by: NURSE PRACTITIONER

## 2025-02-10 PROCEDURE — 1159F MED LIST DOCD IN RCRD: CPT | Performed by: NURSE PRACTITIONER

## 2025-02-10 PROCEDURE — 1125F AMNT PAIN NOTED PAIN PRSNT: CPT | Performed by: NURSE PRACTITIONER

## 2025-02-10 RX ORDER — SEMAGLUTIDE 1.7 MG/.75ML
1.7 INJECTION, SOLUTION SUBCUTANEOUS WEEKLY
Qty: 3 ML | Refills: 0 | Status: SHIPPED | OUTPATIENT
Start: 2025-02-10

## 2025-02-10 RX ORDER — AMITRIPTYLINE HYDROCHLORIDE 100 MG/1
100 TABLET ORAL NIGHTLY
Qty: 90 TABLET | Refills: 1 | Status: SHIPPED | OUTPATIENT
Start: 2025-02-10

## 2025-02-10 RX ORDER — LUBIPROSTONE 8 UG/1
8 CAPSULE ORAL 2 TIMES DAILY WITH MEALS
Qty: 180 CAPSULE | Refills: 1 | Status: SHIPPED | OUTPATIENT
Start: 2025-02-10

## 2025-02-10 NOTE — PROGRESS NOTES
Chief Complaint  Follow-up    Subjective            Suzanne Gutierrez presents to [facility]  History of Present Illness    History of Present Illness  The patient presents for weight management, some constipation, and insomnia.    She reports no weight loss or gain since her last visit. She is currently on a regimen of Wegovy 1 mg once weekly, which she tolerates well without experiencing any adverse effects such as pharyngeal discomfort, nausea, vomiting, or diarrhea. She does not experience any difficulty swallowing, abdominal pain, chest pain, visual disturbances, dizziness, lightheadedness, fainting, or syncope.  Although she is still having constipation    She has been unable to obtain her Amitiza prescription due to a pending prior authorization from her insurance company.  She is requesting that Rx to be sent back in through [facility] and then if it does not cover she will let me know or if is not available she will let me know or if they need it sent through [facility]-otherwise she will let me know if not covered or available and we can send in MiraLAX instead    She has chronic shortness of breath with her lung disease/lung fibrosis and sees pulmonary for this.    She is on amitriptyline for insomnia. She reports no suicidal ideation or self-injurious behavior.  Overall tolerates very well no side effects no issues reported and she wants this Rx changed from [facility] over to [facility] as well    MEDICATIONS  Current: Wegovy, amitriptyline      PHQ-2 Total Score:    PHQ-9 Total Score:      Past Medical History:   Diagnosis Date    Diverticulitis     Insomnia     Oxygen dependent     wears 02 at hs only    Pre-diabetes     Pulmonary fibrosis     Seizures     Sleep apnea     Vertigo     Vertigo        Allergies   Allergen Reactions    Cephalexin Itching    Excedrin Migraine [Asa-Apap-Caff Buffered] GI Intolerance    Gabapentin Unknown - High Severity    Naproxen  Unknown - High Severity        Past Surgical History:   Procedure Laterality Date    APPENDECTOMY      CHOLECYSTECTOMY      COLONOSCOPY N/A 2022    Procedure: COLONOSCOPY WITH POLYPECTOMIES, BX;  Surgeon: Nicko Aranda MD;  Location: [facility] ENDOSCOPY;  Service: Gastroenterology;  Laterality: N/A;  COLON POLYPS, DIVERTICULOSIS, HEMORRHOIDS, RECTAL ULCER    HYSTERECTOMY      TONSILLECTOMY      TUBAL ABDOMINAL LIGATION          Social History     Tobacco Use    Smoking status: Former     Current packs/day: 0.00     Average packs/day: 1 pack/day for 30.0 years (30.0 ttl pk-yrs)     Types: Cigarettes     Start date: 1974     Quit date: 2004     Years since quittin.6    Smokeless tobacco: Never   Vaping Use    Vaping status: Never Used   Substance Use Topics    Alcohol use: Never    Drug use: Never       Family History   Problem Relation Age of Onset    Alcohol abuse Father     Prostate cancer Brother     Breast cancer Sister     Breast cancer Maternal Aunt     Colon cancer Neg Hx     Ovarian cancer Neg Hx     Uterine cancer Neg Hx         There are no preventive care reminders to display for this patient.     Current Outpatient Medications on File Prior to Visit   Medication Sig    albuterol sulfate  (90 Base) MCG/ACT inhaler INHALE 2 PUFFS BY MOUTH EVERY 6 HOURS AS NEEDED FOR WHEEZING OR SHORTNESS OF AIR    aspirin 81 MG chewable tablet aspirin 81 mg oral tablet,chewable chew 1 tablet (81 mg) by oral route once daily   Active    Elastic Bandages & Supports (T.E.D. Below Knee/L X-Lgth) misc Use 2 each Daily.    Esbriet 267 MG capsule     Fluticasone-Umeclidin-Vilant (Trelegy Ellipta) 100-62.5-25 MCG/ACT inhaler Inhale 1 puff Daily.    ipratropium-albuterol (DUO-NEB) 0.5-2.5 mg/3 ml nebulizer INHALE 3 ML VIA NEBULIZER THREE TIMES A DAY AS NEEDED FOR WHEEZING OR SHORTNESS OF AIR    levETIRAcetam (KEPPRA) 500 MG tablet Take 1 tablet by mouth 2 (Two) Times a Day.    meclizine (ANTIVERT) 25 MG  tablet Take 1 tablet by mouth 3 (Three) Times a Day As Needed for Dizziness.    methylPREDNISolone (MEDROL) 4 MG dose pack Take as directed on package instructions.    tolterodine LA (DETROL LA) 2 MG 24 hr capsule Take 1 capsule by mouth Daily.    vitamin D3 125 MCG (5000 UT) capsule capsule cholecalciferol (vitamin D3) 125 mcg (5,000 unit) oral capsule take 1 capsule by oral route daily   Active    [DISCONTINUED] amitriptyline (ELAVIL) 100 MG tablet Take 1 tablet by mouth Every Night.    [DISCONTINUED] amoxicillin (AMOXIL) 875 MG tablet Take 1 tablet by mouth 2 (Two) Times a Day.    [DISCONTINUED] lubiprostone (Amitiza) 8 MCG capsule Take 1 capsule by mouth 2 (Two) Times a Day With Meals.    [DISCONTINUED] Semaglutide-Weight Management 1 MG/0.5ML solution auto-injector Inject 0.5 mL under the skin into the appropriate area as directed 1 (One) Time Per Week.     No current facility-administered medications on file prior to visit.       Immunization History   Administered Date(s) Administered    ABRYSVO (RSV, 60+ or pregnant women 32-36 wks) 10/17/2023    COVID-19 (MODERNA) 12YRS+ (SPIKEVAX) 10/17/2023, 10/14/2024    COVID-19 (MODERNA) 1st,2nd,3rd Dose Monovalent 12/15/2021    COVID-19 (MODERNA) Monovalent Original Booster 12/15/2021    COVID-19 (PFIZER) BIVALENT 12+YRS 11/03/2022    COVID-19 (PFIZER) Purple Cap Monovalent 05/07/2021, 06/01/2021    Fluad Quad 65+ 11/03/2022    Fluzone  >6mos 09/12/2014    Fluzone High-Dose 65+YRS 10/14/2024    Fluzone High-Dose 65+yrs 10/02/2021, 09/21/2023    PPD Test 07/15/2021, 07/19/2023    Pneumococcal Conjugate 13-Valent (PCV13) 06/19/2017    Pneumococcal Conjugate 20-Valent (PCV20) 12/14/2023    Pneumococcal Polysaccharide (PPSV23) 07/03/2019    Shingrix 09/21/2023, 04/16/2024    Tdap 04/06/2022, 12/14/2023    Zostavax 06/19/2017, 07/03/2019       Review of Systems   Constitutional:  Negative for unexpected weight gain and unexpected weight loss.   HENT:  Negative for trouble  "swallowing.    Eyes:  Negative for blurred vision and double vision.   Respiratory:  Positive for shortness of breath.         Chronic SOB with her lung Dz and stable and unchanged    Cardiovascular:  Negative for chest pain.   Gastrointestinal:  Positive for constipation. Negative for abdominal pain, diarrhea, nausea and vomiting.   Genitourinary: Negative.    Neurological:  Negative for dizziness, seizures, syncope and light-headedness.   Hematological:  Negative for adenopathy.   Psychiatric/Behavioral:  Positive for sleep disturbance. Negative for self-injury, suicidal ideas and depressed mood.         Objective     /77   Pulse 86   Ht 162.6 cm (64\")   Wt 94.8 kg (209 lb)   SpO2 93%   BMI 35.87 kg/m²       Physical Exam  Vitals and nursing note reviewed.   Constitutional:       Appearance: Normal appearance. She is obese.   HENT:      Head: Normocephalic.      Right Ear: External ear normal.      Left Ear: External ear normal.      Nose: Nose normal.      Mouth/Throat:      Mouth: Mucous membranes are moist.   Eyes:      Pupils: Pupils are equal, round, and reactive to light.   Cardiovascular:      Rate and Rhythm: Normal rate and regular rhythm.      Heart sounds: Normal heart sounds.   Pulmonary:      Effort: Pulmonary effort is normal.      Breath sounds: Normal breath sounds.   Abdominal:      Palpations: Abdomen is soft.      Tenderness: There is no abdominal tenderness.   Musculoskeletal:         General: Normal range of motion.      Cervical back: Normal range of motion and neck supple.   Skin:     General: Skin is warm and dry.   Neurological:      Mental Status: She is alert and oriented to person, place, and time.   Psychiatric:         Mood and Affect: Mood normal.         Behavior: Behavior normal.         Thought Content: Thought content normal.         Judgment: Judgment normal.         Result Review :                    Results                 Assessment and Plan      Diagnoses and all " orders for this visit:    1. Class 2 severe obesity with serious comorbidity and body mass index (BMI) of 35.0 to 35.9 in adult, unspecified obesity type (Primary)  -     Semaglutide-Weight Management (Wegovy) 1.7 MG/0.75ML solution auto-injector; Inject 0.75 mL under the skin into the appropriate area as directed 1 (One) Time Per Week.  Dispense: 3 mL; Refill: 0    2. Constipation, unspecified constipation type  -     lubiprostone (Amitiza) 8 MCG capsule; Take 1 capsule by mouth 2 (Two) Times a Day With Meals.  Dispense: 180 capsule; Refill: 1    3. Insomnia, unspecified type  -     amitriptyline (ELAVIL) 100 MG tablet; Take 1 tablet by mouth Every Night.  Dispense: 90 tablet; Refill: 1      Amitiza and Elavil both sent down to [facility] and then increase the Wegovy from 1 mg weekly to 1.7 mg weekly and patient will follow-up at 1 month    If the Amitiza is unavailable or not covered patient will let me know and we will see if we need to send it through [facility] or we need to change it to MiraLAX    Assessment & Plan  1. Weight management.  She is currently on Wegovy 1 mg and tolerating it well without any significant side effects. The dosage of Wegovy will be increased to 1.7 mg weekly. She will follow up next month to further adjust the dose if needed.    2. Constipation.  She has not filled her Amitiza prescription due to issues with prior authorization. The prescription for Amitiza will be resent to [facility]. If Amitiza is not covered, she is advised to use MiraLAX, which can be obtained over the counter.    3. Chronic shortness of breath.  She continues to experience chronic shortness of breath due to her lung disease. No new interventions are planned at this time.    4. Insomnia.  She is on amitriptyline for insomnia. The prescription for amitriptyline will be sent to [facility].    Follow-up  The patient will follow up in 1 month.          Follow Up     Return in about 4 weeks  (around 3/10/2025), or if symptoms worsen or fail to improve, for Recheck.    Patient was given instructions and counseling regarding her condition or for health maintenance advice. Please see specific information pulled into the AVS if appropriate.            Suzanne Gutierrez  reports that she quit smoking about 20 years ago. Her smoking use included cigarettes. She started smoking about 50 years ago. She has a 30 pack-year smoking history. She has never used smokeless tobacco. I have educated her on the risk of diseases from using tobacco products such as cancer, COPD, and heart disease.                Patient or patient representative verbalized consent for the use of Ambient Listening during the visit with  ALBAN Carpio for chart documentation. 2/10/2025  12:07 EST

## 2025-03-19 ENCOUNTER — OFFICE VISIT (OUTPATIENT)
Dept: FAMILY MEDICINE CLINIC | Facility: CLINIC | Age: 75
End: 2025-03-19
Payer: MEDICARE

## 2025-03-19 VITALS
TEMPERATURE: 96.8 F | BODY MASS INDEX: 35.34 KG/M2 | SYSTOLIC BLOOD PRESSURE: 122 MMHG | HEART RATE: 101 BPM | OXYGEN SATURATION: 95 % | DIASTOLIC BLOOD PRESSURE: 62 MMHG | WEIGHT: 205.9 LBS

## 2025-03-19 DIAGNOSIS — R73.03 PREDIABETES: ICD-10-CM

## 2025-03-19 DIAGNOSIS — E66.812 CLASS 2 SEVERE OBESITY WITH SERIOUS COMORBIDITY AND BODY MASS INDEX (BMI) OF 35.0 TO 35.9 IN ADULT, UNSPECIFIED OBESITY TYPE: Primary | ICD-10-CM

## 2025-03-19 DIAGNOSIS — E66.01 CLASS 2 SEVERE OBESITY WITH SERIOUS COMORBIDITY AND BODY MASS INDEX (BMI) OF 35.0 TO 35.9 IN ADULT, UNSPECIFIED OBESITY TYPE: Primary | ICD-10-CM

## 2025-03-19 LAB
ANION GAP SERPL CALCULATED.3IONS-SCNC: 10.9 MMOL/L (ref 5–15)
BUN SERPL-MCNC: 15 MG/DL (ref 8–23)
BUN/CREAT SERPL: 17.2 (ref 7–25)
CALCIUM SPEC-SCNC: 9.6 MG/DL (ref 8.6–10.5)
CHLORIDE SERPL-SCNC: 102 MMOL/L (ref 98–107)
CO2 SERPL-SCNC: 26.1 MMOL/L (ref 22–29)
CREAT SERPL-MCNC: 0.87 MG/DL (ref 0.57–1)
EGFRCR SERPLBLD CKD-EPI 2021: 70 ML/MIN/1.73
GLUCOSE SERPL-MCNC: 84 MG/DL (ref 65–99)
POTASSIUM SERPL-SCNC: 4.2 MMOL/L (ref 3.5–5.2)
SODIUM SERPL-SCNC: 139 MMOL/L (ref 136–145)

## 2025-03-19 PROCEDURE — 1125F AMNT PAIN NOTED PAIN PRSNT: CPT | Performed by: NURSE PRACTITIONER

## 2025-03-19 PROCEDURE — 1159F MED LIST DOCD IN RCRD: CPT | Performed by: NURSE PRACTITIONER

## 2025-03-19 PROCEDURE — 83036 HEMOGLOBIN GLYCOSYLATED A1C: CPT | Performed by: NURSE PRACTITIONER

## 2025-03-19 PROCEDURE — 36415 COLL VENOUS BLD VENIPUNCTURE: CPT | Performed by: NURSE PRACTITIONER

## 2025-03-19 PROCEDURE — 1160F RVW MEDS BY RX/DR IN RCRD: CPT | Performed by: NURSE PRACTITIONER

## 2025-03-19 PROCEDURE — 80048 BASIC METABOLIC PNL TOTAL CA: CPT | Performed by: NURSE PRACTITIONER

## 2025-03-19 PROCEDURE — 99213 OFFICE O/P EST LOW 20 MIN: CPT | Performed by: NURSE PRACTITIONER

## 2025-03-19 NOTE — PROGRESS NOTES
Chief Complaint  Weight Check    Subjective            Suzanne Gutierrez presents to [facility]  History of Present Illness    History of Present Illness  The patient is a 74-year-old female who presents for weight management, prediabetes, and chronic lung disease.    She has achieved a weight loss of approximately 4 pounds through dietary modifications and regular exercise. She reports no adverse effects from her current medication regimen, including nausea, vomiting, severe diarrhea, constipation, abdominal pain, dysphagia, or the presence of pharyngeal masses. Additionally, she has not experienced any visual disturbances, chest pain, excessive fatigue, dizziness, lightheadedness, seizures, syncope, dysuria, or vaginal bleeding.    Her shortness of breath remains stable.    Supplemental Information  She has discontinued the use of Amitiza and Detrol LA.    MEDICATIONS  Discontinued: Amitiza, Detrol LA      PHQ-2 Total Score:      PHQ-9 Total Score:        Past Medical History:   Diagnosis Date    Diverticulitis     Insomnia     Oxygen dependent     wears 02 at hs only    Pre-diabetes     Pulmonary fibrosis     Seizures     Sleep apnea     Vertigo     Vertigo        Allergies   Allergen Reactions    Cephalexin Itching    Excedrin Migraine [Asa-Apap-Caff Buffered] GI Intolerance    Gabapentin Unknown - High Severity    Naproxen Unknown - High Severity        Past Surgical History:   Procedure Laterality Date    APPENDECTOMY      CHOLECYSTECTOMY      COLONOSCOPY N/A 5/2/2022    Procedure: COLONOSCOPY WITH POLYPECTOMIES, BX;  Surgeon: Nicko Aranda MD;  Location: [facility] ENDOSCOPY;  Service: Gastroenterology;  Laterality: N/A;  COLON POLYPS, DIVERTICULOSIS, HEMORRHOIDS, RECTAL ULCER    HYSTERECTOMY      TONSILLECTOMY      TUBAL ABDOMINAL LIGATION          Social History     Tobacco Use    Smoking status: Former     Current packs/day: 0.00     Average packs/day: 1 pack/day for 30.0  years (30.0 ttl pk-yrs)     Types: Cigarettes     Start date: 1974     Quit date: 2004     Years since quittin.7    Smokeless tobacco: Never   Vaping Use    Vaping status: Never Used   Substance Use Topics    Alcohol use: Never    Drug use: Never       Family History   Problem Relation Age of Onset    Alcohol abuse Father     Prostate cancer Brother     Breast cancer Sister     Breast cancer Maternal Aunt     Colon cancer Neg Hx     Ovarian cancer Neg Hx     Uterine cancer Neg Hx         There are no preventive care reminders to display for this patient.     Current Outpatient Medications on File Prior to Visit   Medication Sig    albuterol sulfate  (90 Base) MCG/ACT inhaler INHALE 2 PUFFS BY MOUTH EVERY 6 HOURS AS NEEDED FOR WHEEZING OR SHORTNESS OF AIR    amitriptyline (ELAVIL) 100 MG tablet Take 1 tablet by mouth Every Night.    aspirin 81 MG chewable tablet aspirin 81 mg oral tablet,chewable chew 1 tablet (81 mg) by oral route once daily   Active    Esbriet 267 MG capsule     Fluticasone-Umeclidin-Vilant (Trelegy Ellipta) 100-62.5-25 MCG/ACT inhaler Inhale 1 puff Daily.    ipratropium-albuterol (DUO-NEB) 0.5-2.5 mg/3 ml nebulizer INHALE 3 ML VIA NEBULIZER THREE TIMES A DAY AS NEEDED FOR WHEEZING OR SHORTNESS OF AIR    levETIRAcetam (KEPPRA) 500 MG tablet Take 1 tablet by mouth 2 (Two) Times a Day.    meclizine (ANTIVERT) 25 MG tablet Take 1 tablet by mouth 3 (Three) Times a Day As Needed for Dizziness.    vitamin D3 125 MCG (5000 UT) capsule capsule cholecalciferol (vitamin D3) 125 mcg (5,000 unit) oral capsule take 1 capsule by oral route daily   Active    [DISCONTINUED] Semaglutide-Weight Management (Wegovy) 1.7 MG/0.75ML solution auto-injector Inject 0.75 mL under the skin into the appropriate area as directed 1 (One) Time Per Week.    [DISCONTINUED] Elastic Bandages & Supports (T.E.D. Below Knee/L X-Lgth) misc Use 2 each Daily.    [DISCONTINUED] lubiprostone (Amitiza) 8 MCG capsule Take 1  capsule by mouth 2 (Two) Times a Day With Meals.    [DISCONTINUED] methylPREDNISolone (MEDROL) 4 MG dose pack Take as directed on package instructions.    [DISCONTINUED] tolterodine LA (DETROL LA) 2 MG 24 hr capsule Take 1 capsule by mouth Daily.     No current facility-administered medications on file prior to visit.       Immunization History   Administered Date(s) Administered    ABRYSVO (RSV, 60+ or pregnant women 32-36 wks) 10/17/2023    COVID-19 (MODERNA) 12YRS+ (SPIKEVAX) 10/17/2023, 10/14/2024    COVID-19 (MODERNA) 1st,2nd,3rd Dose Monovalent 12/15/2021    COVID-19 (MODERNA) Monovalent Original Booster 12/15/2021    COVID-19 (PFIZER) BIVALENT 12+YRS 11/03/2022    COVID-19 (PFIZER) Purple Cap Monovalent 05/07/2021, 06/01/2021    Fluad Quad 65+ 11/03/2022    Fluzone  >6mos 09/12/2014    Fluzone High-Dose 65+YRS 10/14/2024    Fluzone High-Dose 65+yrs 10/02/2021, 09/21/2023    PPD Test 07/15/2021, 07/19/2023    Pneumococcal Conjugate 13-Valent (PCV13) 06/19/2017    Pneumococcal Conjugate 20-Valent (PCV20) 12/14/2023    Pneumococcal Polysaccharide (PPSV23) 07/03/2019    Shingrix 09/21/2023, 04/16/2024    Tdap 04/06/2022, 12/14/2023    Zostavax 06/19/2017, 07/03/2019       Review of Systems   Constitutional:  Negative for fatigue and unexpected weight loss.   HENT:  Negative for trouble swallowing.    Eyes:  Negative for blurred vision and double vision.   Respiratory:  Positive for shortness of breath.         Unchanged chronic lung Dz    Cardiovascular:  Negative for chest pain.   Gastrointestinal:  Negative for abdominal pain, constipation, diarrhea, nausea and vomiting.   Genitourinary:  Negative for dysuria and vaginal bleeding.   Neurological:  Negative for dizziness, seizures, syncope and light-headedness.   Hematological:  Negative for adenopathy.        Objective     /62 (BP Location: Left arm, Patient Position: Sitting, Cuff Size: Large Adult)   Pulse 101   Temp 96.8 °F (36 °C) (Infrared)   Wt  93.4 kg (205 lb 14.4 oz)   SpO2 95%   BMI 35.34 kg/m²       Physical Exam  Vitals and nursing note reviewed.   Constitutional:       Appearance: Normal appearance. She is obese.      Comments: Patient has lost 15 pounds since October 2024   HENT:      Head: Normocephalic.      Right Ear: External ear normal.      Left Ear: External ear normal.      Nose: Nose normal.      Mouth/Throat:      Mouth: Mucous membranes are moist.   Eyes:      Pupils: Pupils are equal, round, and reactive to light.   Cardiovascular:      Rate and Rhythm: Normal rate and regular rhythm.      Heart sounds: Normal heart sounds.   Pulmonary:      Effort: Pulmonary effort is normal.      Breath sounds: Normal breath sounds.   Abdominal:      General: Bowel sounds are normal.      Palpations: Abdomen is soft.      Tenderness: There is no abdominal tenderness.   Musculoskeletal:         General: Normal range of motion.      Cervical back: Normal range of motion and neck supple.   Skin:     General: Skin is warm and dry.   Neurological:      Mental Status: She is alert and oriented to person, place, and time.   Psychiatric:         Mood and Affect: Mood normal.         Behavior: Behavior normal.         Thought Content: Thought content normal.         Judgment: Judgment normal.         Result Review :                    Results                 Assessment and Plan      Diagnoses and all orders for this visit:    1. Class 2 severe obesity with serious comorbidity and body mass index (BMI) of 35.0 to 35.9 in adult, unspecified obesity type (Primary)  -     Semaglutide-Weight Management 2.4 MG/0.75ML solution auto-injector; Inject 0.75 mL under the skin into the appropriate area as directed 1 (One) Time Per Week.  Dispense: 9 mL; Refill: 0  -     Basic Metabolic Panel  -     Hemoglobin A1c    2. Prediabetes  -     Basic Metabolic Panel  -     Hemoglobin A1c          Assessment & Plan  1. Weight management.  She has successfully reduced her weight  by 15 pounds since October 2024, with a current weight of 205 pounds. Her body mass index (BMI) has also decreased to 35. She is advised to maintain her physical activity, particularly walking, to preserve muscle mass. Adequate hydration is also recommended. A prescription for Wegovy 1.7 mg has been issued, with a plan to increase the dosage to 2.4 mg. A 90-day supply will be provided. She discontinued the use of INGA hose and Amitiza. If she experiences any adverse effects such as nausea, vomiting, severe diarrhea, constipation, abdominal pain, trouble swallowing, lumps in the throat, vision changes, chest pain, or terrible fatigue, she should notify the clinic immediately.  And I also advised the patient that with these GLP-1 weight loss shots that the patient is to be very conscientious of staying well-hydrated and eating plenty of protein getting exercise and staying active-as 60% of the weight loss is fat but 40% of the weight loss is muscle and she verbalized understanding    2. Prediabetes.  Her prediabetes condition appears to be improving. A basic metabolic panel and A1c test will be conducted to monitor her kidney function and blood sugar levels.    3. Chronic lung disease.  Her chronic lung disease remains unchanged. Her oxygen saturation has improved from 91% and 93% to 95%.    Follow-up  The patient will follow up in 3 months.          Follow Up     Return in about 3 months (around 6/19/2025), or if symptoms worsen or fail to improve, for Recheck.    Patient was given instructions and counseling regarding her condition or for health maintenance advice. Please see specific information pulled into the AVS if appropriate.            Suzanne Gutierrez  reports that she quit smoking about 20 years ago. Her smoking use included cigarettes. She started smoking about 50 years ago. She has a 30 pack-year smoking history. She has never used smokeless tobacco. I have educated her on the risk of diseases from using  tobacco products such as cancer, COPD, and heart disease.              Patient or patient representative verbalized consent for the use of Ambient Listening during the visit with  ALBAN Carpio for chart documentation. 3/19/2025  12:36 EDT

## 2025-03-19 NOTE — PROGRESS NOTES
..  Venipuncture Blood Specimen Collection  Venipuncture performed in left arm by Violeta Amor with good hemostasis. Patient tolerated the procedure well without complications.   03/19/25   Violeta mAor

## 2025-03-20 ENCOUNTER — RESULTS FOLLOW-UP (OUTPATIENT)
Dept: FAMILY MEDICINE CLINIC | Facility: CLINIC | Age: 75
End: 2025-03-20
Payer: OTHER GOVERNMENT

## 2025-03-20 LAB — HBA1C MFR BLD: 5.3 % (ref 4.8–5.6)

## 2025-03-20 NOTE — LETTER
Suzanne Gutierrez  [address] [place] [address]    March 20, 2025     Dear Ms. Gutierrez:    Below are the results from your recent visit:    Suzanne hemoglobin A1c is now in normal range at 5.3% and your basic metabolic panel shows normal glucose normal kidney function test and normal electrolytes     Resulted Orders   Basic Metabolic Panel   Result Value Ref Range    Glucose 84 65 - 99 mg/dL    BUN 15 8 - 23 mg/dL    Creatinine 0.87 0.57 - 1.00 mg/dL    Sodium 139 136 - 145 mmol/L    Potassium 4.2 3.5 - 5.2 mmol/L    Chloride 102 98 - 107 mmol/L    CO2 26.1 22.0 - 29.0 mmol/L    Calcium 9.6 8.6 - 10.5 mg/dL    BUN/Creatinine Ratio 17.2 7.0 - 25.0    Anion Gap 10.9 5.0 - 15.0 mmol/L    eGFR 70.0 >60.0 mL/min/1.73   Hemoglobin A1c   Result Value Ref Range    Hemoglobin A1C 5.30 4.80 - 5.60 %       If you have any questions or concerns, please don't hesitate to call.         Sincerely,        ALBAN Carpio

## 2025-03-20 NOTE — PROGRESS NOTES
Please mail letter to patient stating    Suzanne hemoglobin A1c is now in normal range at 5.3% and your basic metabolic panel shows normal glucose normal kidney function test and normal electrolytes

## 2025-04-30 ENCOUNTER — TRANSCRIBE ORDERS (OUTPATIENT)
Dept: ADMINISTRATIVE | Facility: HOSPITAL | Age: 75
End: 2025-04-30
Payer: OTHER GOVERNMENT

## 2025-04-30 DIAGNOSIS — R91.1 LUNG NODULE: Primary | ICD-10-CM

## 2025-04-30 DIAGNOSIS — R91.8 ABNORMAL CT SCAN OF LUNG: ICD-10-CM

## 2025-05-01 NOTE — TELEPHONE ENCOUNTER
Caller: Suzanne Gutierrez    Relationship: Self    Best call back number: [phone] OKAY TO LEAVE VOICE MAIL ON PHONE    What medication are you requesting: YEAST MEDICATION  What are your current symptoms: YEAST INFECTION      If a prescription is needed, what is your preferred pharmacy and phone number: [id number] [place], [place] - [address] AT [facility] [phone]  - [phone]      Additional notes:PATIENT SAID THE MEDICATION SHE WAS PRESCRIBED PREVIOUSLY DID NOT TAKE AWAY SYMPTOMS.        
Him/He

## 2025-05-13 ENCOUNTER — OFFICE VISIT (OUTPATIENT)
Dept: FAMILY MEDICINE CLINIC | Facility: CLINIC | Age: 75
End: 2025-05-13
Payer: MEDICARE

## 2025-05-13 VITALS
HEART RATE: 58 BPM | BODY MASS INDEX: 34.15 KG/M2 | OXYGEN SATURATION: 96 % | DIASTOLIC BLOOD PRESSURE: 72 MMHG | TEMPERATURE: 96.9 F | HEIGHT: 64 IN | SYSTOLIC BLOOD PRESSURE: 126 MMHG | WEIGHT: 200 LBS

## 2025-05-13 DIAGNOSIS — G47.00 INSOMNIA, UNSPECIFIED TYPE: ICD-10-CM

## 2025-05-13 DIAGNOSIS — J44.9 COPD MIXED TYPE: Primary | ICD-10-CM

## 2025-05-13 DIAGNOSIS — R42 VERTIGO: ICD-10-CM

## 2025-05-13 DIAGNOSIS — Z86.0100 HISTORY OF COLONIC POLYPS: ICD-10-CM

## 2025-05-13 DIAGNOSIS — M17.0 PRIMARY OSTEOARTHRITIS OF BOTH KNEES: ICD-10-CM

## 2025-05-13 DIAGNOSIS — K62.5 RECTAL BLEEDING: ICD-10-CM

## 2025-05-13 DIAGNOSIS — J98.4 LUNG DISEASE: ICD-10-CM

## 2025-05-13 DIAGNOSIS — R73.03 PREDIABETES: ICD-10-CM

## 2025-05-13 DIAGNOSIS — E66.811 CLASS 1 OBESITY WITH SERIOUS COMORBIDITY AND BODY MASS INDEX (BMI) OF 34.0 TO 34.9 IN ADULT, UNSPECIFIED OBESITY TYPE: ICD-10-CM

## 2025-05-13 DIAGNOSIS — Z23 NEED FOR COVID-19 VACCINE: ICD-10-CM

## 2025-05-13 DIAGNOSIS — J84.10 FIBROSIS OF LUNG: ICD-10-CM

## 2025-05-13 RX ORDER — MECLIZINE HYDROCHLORIDE 25 MG/1
25 TABLET ORAL 3 TIMES DAILY PRN
Qty: 90 TABLET | Refills: 2 | Status: SHIPPED | OUTPATIENT
Start: 2025-05-13

## 2025-05-13 RX ORDER — ALBUTEROL SULFATE 90 UG/1
2 INHALANT RESPIRATORY (INHALATION) EVERY 6 HOURS PRN
Qty: 18 G | Refills: 3 | Status: SHIPPED | OUTPATIENT
Start: 2025-05-13

## 2025-05-13 RX ORDER — AMITRIPTYLINE HYDROCHLORIDE 100 MG/1
100 TABLET ORAL NIGHTLY
Qty: 90 TABLET | Refills: 1 | Status: SHIPPED | OUTPATIENT
Start: 2025-05-13

## 2025-05-13 NOTE — PROGRESS NOTES
Chief Complaint  Headache (Headaches off and on for 2 weeks. ), GI Problem (She has had some rectal bleeding 3 times out of a month.  ), and Obesity (Medication refills. )    Subjective            Suzanne Gutierrez presents to [facility]  History of Present Illness    History of Present Illness  The patient is a 75-year-old female who presents for evaluation of rectal bleeding, headaches, chronic lung disease, vertigo, seizures, prediabetes, bilateral knee pain, and health maintenance.    She reports experiencing rectal bleeding on three occasions, initially mistaking it for diarrhea. The onset of these symptoms was approximately 2 to 3 weekends ago, coinciding with a period of illness characterized by diarrhea. She has not had any episodes of diarrhea since then. She also reports no constipation or abdominal pain. Her last colonoscopy was performed in 05/2022 by Dr. Aranda, who recommended a repeat procedure in 5 years. She recalls a similar episode of rectal bleeding during a hospitalization for COVID-19 in February 2020, which resolved without intervention.  But the patient reports very concerned although this has now resolved--in the past prior colonoscopy did show internal hemorrhoids    She has been experiencing intermittent headaches for the past 2 weeks, although the frequency has decreased recently. She has not run out of her medications. She reports no dizziness, lightheadedness, seizures, or fainting.  And the patient reports that everything has resolved now and she is not having the frequency of the headaches now    She experiences shortness of breath on exertion and was previously prescribed Esbriet for her lungs through pulmonology but had to discontinue due to gastrointestinal side effects. She reports no chest pain but does report persistent fatigue. She has been advised to use oxygen during the day but has not yet received it.    She has a history of benign paroxysmal  positional vertigo (BPPV), which has flared up twice in the past 2 to 3 years.    She is currently on Keppra for seizure management and has not had any seizures since starting this medication. She is under the care of ALBAN Guaman. She had a single seizure episode 4 years ago, which was associated with a focal lesion. However, a subsequent scan showed no evidence of a lesion. She was advised to receive injections for occipital neuralgia but found them intolerable.    She has a history of prediabetes and with obesity has lost a total of 22 pounds since starting the medication and is currently on Wegovy, which she reports as effective. She has lost a total of 33 pounds total in the past 2 years with her weight loss efforts    She reports bilateral knee pain and suspects she may need knee replacements but is reluctant to undergo the procedure. She has found relief from using Voltaren gel on her knees. She has a history of arthritis in one knee, which was previously treated with surgery, but the arthritis has since recurred.    She is due for a COVID-19 vaccine. She is on amitriptyline for sleep issues and finds it effective denies all SI/HI tolerating medication well no side effects no issues reported overall state. She is on meclizine for dizziness.    She is on vitamin D 5000.     She is on albuterol and Trelegy for her lungs.  And does see pulmonology and she has a history of fibrosis of the lung COPD mixed and lung disease and she is overall stable at this time although he has ordered O2 for her to utilize since her 6-minute walking test showed that she required oxygen at times    PAST SURGICAL HISTORY:  Arthritis surgery on one knee.      PHQ-2 Total Score: 0    PHQ-9 Total Score:        Past Medical History:   Diagnosis Date    Diverticulitis     Insomnia     Oxygen dependent     wears 02 at hs only    Pre-diabetes     Pulmonary fibrosis     Seizures     Sleep apnea     Vertigo     Vertigo         Allergies   Allergen Reactions    Cephalexin Itching    Excedrin Migraine [Asa-Apap-Caff Buffered] GI Intolerance    Gabapentin Unknown - High Severity    Naproxen Unknown - High Severity        Past Surgical History:   Procedure Laterality Date    APPENDECTOMY      CHOLECYSTECTOMY      COLONOSCOPY N/A 2022    Procedure: COLONOSCOPY WITH POLYPECTOMIES, BX;  Surgeon: Nicko Aranda MD;  Location: [facility];  Service: Gastroenterology;  Laterality: N/A;  COLON POLYPS, DIVERTICULOSIS, HEMORRHOIDS, RECTAL ULCER    HYSTERECTOMY      TONSILLECTOMY      TUBAL ABDOMINAL LIGATION          Social History     Tobacco Use    Smoking status: Former     Current packs/day: 0.00     Average packs/day: 1 pack/day for 30.0 years (30.0 ttl pk-yrs)     Types: Cigarettes     Start date: 1974     Quit date: 2004     Years since quittin.8    Smokeless tobacco: Never   Vaping Use    Vaping status: Never Used   Substance Use Topics    Alcohol use: Never    Drug use: Never       Family History   Problem Relation Age of Onset    Alcohol abuse Father     Prostate cancer Brother     Breast cancer Sister     Breast cancer Maternal Aunt     Colon cancer Neg Hx     Ovarian cancer Neg Hx     Uterine cancer Neg Hx         There are no preventive care reminders to display for this patient.       Current Outpatient Medications on File Prior to Visit   Medication Sig    aspirin 81 MG chewable tablet aspirin 81 mg oral tablet,chewable chew 1 tablet (81 mg) by oral route once daily   Active    ipratropium-albuterol (DUO-NEB) 0.5-2.5 mg/3 ml nebulizer INHALE 3 ML VIA NEBULIZER THREE TIMES A DAY AS NEEDED FOR WHEEZING OR SHORTNESS OF AIR    levETIRAcetam (KEPPRA) 500 MG tablet Take 1 tablet by mouth 2 (Two) Times a Day.    vitamin D3 125 MCG (5000 UT) capsule capsule cholecalciferol (vitamin D3) 125 mcg (5,000 unit) oral capsule take 1 capsule by oral route daily   Active    [DISCONTINUED] albuterol sulfate  (90  Base) MCG/ACT inhaler INHALE 2 PUFFS BY MOUTH EVERY 6 HOURS AS NEEDED FOR WHEEZING OR SHORTNESS OF AIR    [DISCONTINUED] amitriptyline (ELAVIL) 100 MG tablet Take 1 tablet by mouth Every Night.    [DISCONTINUED] Esbriet 267 MG capsule     [DISCONTINUED] Fluticasone-Umeclidin-Vilant (Trelegy Ellipta) 100-62.5-25 MCG/ACT inhaler Inhale 1 puff Daily.    [DISCONTINUED] meclizine (ANTIVERT) 25 MG tablet Take 1 tablet by mouth 3 (Three) Times a Day As Needed for Dizziness.    [DISCONTINUED] Semaglutide-Weight Management 2.4 MG/0.75ML solution auto-injector Inject 0.75 mL under the skin into the appropriate area as directed 1 (One) Time Per Week.     No current facility-administered medications on file prior to visit.       Immunization History   Administered Date(s) Administered    ABRYSVO (RSV, 60+ or pregnant women 32-36 wks) 10/17/2023    COVID-19 (MODERNA) 12YRS+ (SPIKEVAX) 10/17/2023, 10/14/2024    COVID-19 (MODERNA) 1st,2nd,3rd Dose Monovalent 12/15/2021    COVID-19 (MODERNA) Monovalent Original Booster 12/15/2021    COVID-19 (PFIZER) 12YRS+ (COMIRNATY) 05/13/2025    COVID-19 (PFIZER) BIVALENT 12+YRS 11/03/2022    COVID-19 (PFIZER) Purple Cap Monovalent 05/07/2021, 06/01/2021    Fluad Quad 65+ 11/03/2022    Fluzone  >6mos 09/12/2014    Fluzone High-Dose 65+YRS 10/14/2024    Fluzone High-Dose 65+yrs 10/02/2021, 09/21/2023    PPD Test 07/15/2021, 07/19/2023    Pneumococcal Conjugate 13-Valent (PCV13) 06/19/2017    Pneumococcal Conjugate 20-Valent (PCV20) 12/14/2023    Pneumococcal Polysaccharide (PPSV23) 07/03/2019    Shingrix 09/21/2023, 04/16/2024    Tdap 04/06/2022, 12/14/2023    Zostavax 06/19/2017, 07/03/2019       Review of Systems   Constitutional:  Positive for fatigue.   HENT:  Negative for trouble swallowing.    Eyes:  Negative for blurred vision and double vision.   Respiratory:  Positive for shortness of breath.         With the chronic lung issues some SOBOE    Cardiovascular:  Negative for chest pain.  "  Gastrointestinal:  Positive for blood in stool and diarrhea. Negative for abdominal distention, abdominal pain, constipation, nausea and vomiting.        2-3 weekends ago was experiencing diarrhea since resolved --in the beginning some blood in the stool and then this happened approx 3 times--last time with blood in stool was couple weeks ago--no abd pain--was in the hospital Feb 2020 this same thing happened then when hospitalized with COVID-and resolved  --then none since then and resolved until recently    Endocrine: Negative for polydipsia, polyphagia and polyuria.   Genitourinary:  Negative for dysuria and vaginal bleeding.   Musculoskeletal:  Positive for arthralgias. Negative for back pain and neck pain.        Bilat knees    Neurological:  Positive for headache. Negative for dizziness, tremors, seizures, syncope and light-headedness.        HA's on and off x 2 weeks --and now reports not experiencing these as bad as before --and the the vertigo is the same BPPV she has experienced in the past--unchanged and flares up at times--2 time sin the past 2-3 yrs    Hematological:  Negative for adenopathy.   Psychiatric/Behavioral:  Negative for self-injury and suicidal ideas.         Objective     /72   Pulse 58   Temp 96.9 °F (36.1 °C) (Temporal)   Ht 162.6 cm (64\")   Wt 90.7 kg (200 lb)   SpO2 96%   BMI 34.33 kg/m²       Physical Exam  Vitals and nursing note reviewed.   Constitutional:       Appearance: Normal appearance.   HENT:      Head: Normocephalic.      Right Ear: External ear normal.      Left Ear: External ear normal.      Nose: Nose normal.      Mouth/Throat:      Mouth: Mucous membranes are moist.   Eyes:      Pupils: Pupils are equal, round, and reactive to light.   Cardiovascular:      Rate and Rhythm: Normal rate and regular rhythm.      Heart sounds: Normal heart sounds.   Pulmonary:      Effort: Pulmonary effort is normal.      Breath sounds: Normal breath sounds.   Abdominal:      " Palpations: Abdomen is soft.      Tenderness: There is no abdominal tenderness.   Musculoskeletal:         General: Normal range of motion.      Cervical back: Normal range of motion and neck supple.      Right knee: Bony tenderness and crepitus present. Tenderness present.      Left knee: Crepitus present.        Legs:    Skin:     General: Skin is warm and dry.   Neurological:      Mental Status: She is alert and oriented to person, place, and time.   Psychiatric:         Mood and Affect: Mood normal.         Behavior: Behavior normal.         Thought Content: Thought content normal.         Judgment: Judgment normal.         Result Review :     The following data was reviewed by: ALBAN Carpio on 05/13/2025:           Office Visit with Yasmeen Matamoros APRN (10/29/2024)     Results  Labs   - A1c: Normal   - Kidney function test: Normal    Imaging   - Colonoscopy: 05/2022, Polyp, multiple diverticula, internal hemorrhoids, ulcer in the rectum area, another polyp               Assessment and Plan      Diagnoses and all orders for this visit:    1. COPD mixed type (Primary)  -     Fluticasone-Umeclidin-Vilant (Trelegy Ellipta) 100-62.5-25 MCG/ACT inhaler; Inhale 1 puff Daily.  Dispense: 90 each; Refill: 1  -     albuterol sulfate  (90 Base) MCG/ACT inhaler; Inhale 2 puffs Every 6 (Six) Hours As Needed for Wheezing or Shortness of Air.  Dispense: 18 g; Refill: 3    2. Fibrosis of lung  -     Fluticasone-Umeclidin-Vilant (Trelegy Ellipta) 100-62.5-25 MCG/ACT inhaler; Inhale 1 puff Daily.  Dispense: 90 each; Refill: 1  -     albuterol sulfate  (90 Base) MCG/ACT inhaler; Inhale 2 puffs Every 6 (Six) Hours As Needed for Wheezing or Shortness of Air.  Dispense: 18 g; Refill: 3    3. Lung disease  -     Fluticasone-Umeclidin-Vilant (Trelegy Ellipta) 100-62.5-25 MCG/ACT inhaler; Inhale 1 puff Daily.  Dispense: 90 each; Refill: 1  -     albuterol sulfate  (90 Base) MCG/ACT inhaler; Inhale 2  puffs Every 6 (Six) Hours As Needed for Wheezing or Shortness of Air.  Dispense: 18 g; Refill: 3    4. Insomnia, unspecified type  -     amitriptyline (ELAVIL) 100 MG tablet; Take 1 tablet by mouth Every Night.  Dispense: 90 tablet; Refill: 1    5. Vertigo  -     meclizine (ANTIVERT) 25 MG tablet; Take 1 tablet by mouth 3 (Three) Times a Day As Needed for Dizziness.  Dispense: 90 tablet; Refill: 2    6. Class 1 obesity with serious comorbidity and body mass index (BMI) of 34.0 to 34.9 in adult, unspecified obesity type  -     Semaglutide-Weight Management 2.4 MG/0.75ML solution auto-injector; Inject 0.75 mL under the skin into the appropriate area as directed 1 (One) Time Per Week.  Dispense: 9 mL; Refill: 0    7. Need for COVID-19 vaccine  -     COVID-19 (Pfizer) 12yrs+ (COMIRNATY)    8. Rectal bleeding  -     Ambulatory Referral to Gastroenterology    9. History of colonic polyps  -     Ambulatory Referral to Gastroenterology    10. Prediabetes  Comments:  very stable and most recent A1C was normal    11. Primary osteoarthritis of both knees  -     Diclofenac Sodium (Voltaren) 1 % gel gel; Apply 4 g topically to the appropriate area as directed 4 (Four) Times a Day As Needed (knees).  Dispense: 100 g; Refill: 5      Medications refilled as noted above    Recent labs were done and reviewed    Diclofenac 1% gel topical sent and since she is already use this and tolerated well    And referral to gastroenterology for the further evaluation of the rectal bleeding that she experienced    Assessment & Plan  1. Rectal bleeding.  - Reported experiencing rectal bleeding three times, initially associated with diarrhea that occurred two to three weekends ago.  - The bleeding has since resolved.  - Given her history of polyps, multiple diverticula, internal hemorrhoids, and an ulcer identified during her last colonoscopy in 2022.  - Referral to Dr. Briones's office is warranted for further evaluation and potential repeat  colonoscopy.    2. Headaches.  - Reported having headaches on and off for about two weeks.  - Notes they are not as severe as before.  - No new medication is required at this time.    3. Chronic lung disease.  - Experiences shortness of breath on exertion.  - Previously prescribed Esbriet but discontinued it due to side effects.  - Will continue her current medications, including albuterol and Trelegy.    4. Benign paroxysmal positional vertigo (BPPV).  - Has experienced BPPV in the past, with flare-ups occurring twice in the past two to three years.  - Will continue taking meclizine as needed for dizziness.    5. Seizure disorder.  - Currently on Keppra for seizures.  - Has not had any seizures in the past four years.  - Will continue her current medication regimen.    6. Prediabetes.  - Prediabetes is stable with a recent A1c within normal limits.  - Has lost a total of 33 pounds, including 22 pounds since starting semaglutide (Wegovy).  - Will continue her current medication regimen and follow up with her primary care provider as needed.    7. Bilateral knee pain.  - Reported knee pain and has previously used Voltaren gel (diclofenac) with good effect.  - A prescription for Voltaren gel will be sent to pharmacy.    8. Health maintenance.  - Will receive her COVID-19 vaccine today.  - Prescriptions for albuterol, Trelegy (generic fluticasone with the combination of two other medications), meclizine, amitriptyline, vitamin D 5000 IU, and semaglutide (Wegovy) have been refilled.  - The Wegovy prescription will be sent to [facility] due to insurance coverage.    Follow-up  The patient will follow up in 3 months.          Follow Up     Return in about 3 months (around 8/7/2025), or if symptoms worsen or fail to improve, for Recheck.    Patient was given instructions and counseling regarding her condition or for health maintenance advice. Please see specific information pulled into the AVS if appropriate.             Suzanne Gutierrez  reports that she quit smoking about 20 years ago. Her smoking use included cigarettes. She started smoking about 50 years ago. She has a 30 pack-year smoking history. She has never used smokeless tobacco. I have educated her on the risk of diseases from using tobacco products such as cancer, COPD, and heart disease.           Patient or patient representative verbalized consent for the use of Ambient Listening during the visit with  ALBAN Carpio for chart documentation. 5/13/2025  14:07 EDT

## 2025-05-19 ENCOUNTER — PATIENT MESSAGE (OUTPATIENT)
Dept: FAMILY MEDICINE CLINIC | Facility: CLINIC | Age: 75
End: 2025-05-19
Payer: OTHER GOVERNMENT

## 2025-05-19 DIAGNOSIS — J44.1 COPD WITH EXACERBATION: Primary | ICD-10-CM

## 2025-05-19 DIAGNOSIS — R09.3 ABNORMAL SPUTUM COLOR: ICD-10-CM

## 2025-05-19 RX ORDER — AZITHROMYCIN 250 MG/1
TABLET, FILM COATED ORAL
Qty: 6 TABLET | Refills: 0 | Status: SHIPPED | OUTPATIENT
Start: 2025-05-19

## 2025-05-30 ENCOUNTER — TELEPHONE (OUTPATIENT)
Dept: GASTROENTEROLOGY | Facility: CLINIC | Age: 75
End: 2025-05-30
Payer: OTHER GOVERNMENT

## 2025-06-13 DIAGNOSIS — E66.811 CLASS 1 OBESITY WITH SERIOUS COMORBIDITY AND BODY MASS INDEX (BMI) OF 34.0 TO 34.9 IN ADULT, UNSPECIFIED OBESITY TYPE: ICD-10-CM

## 2025-06-13 NOTE — TELEPHONE ENCOUNTER
Caller: Suzanne Gutierrez    Relationship: Self    Best call back number:     [phone]     Requested Prescriptions:   Requested Prescriptions     Pending Prescriptions Disp Refills    Semaglutide-Weight Management 2.4 MG/0.75ML solution auto-injector 9 mL 0     Sig: Inject 0.75 mL under the skin into the appropriate area as directed 1 (One) Time Per Week.        Pharmacy where request should be sent: [facility] [phone] [place], [place] [phone] [facility]     Last office visit with prescribing clinician: 5/13/2025   Last telemedicine visit with prescribing clinician: Visit date not found   Next office visit with prescribing clinician: 8/4/2025     Additional details provided by patient: DUE FOR NEXT INJECTION 6.17.2025    Does the patient have less than a 3 day supply:  [x] Yes  [] No    Would you like a call back once the refill request has been completed: [] Yes [x] No    If the office needs to give you a call back, can they leave a voicemail: [] Yes [x] No    Richmond Rodriguez Rep   06/13/25 09:17 EDT

## 2025-07-07 ENCOUNTER — TELEPHONE (OUTPATIENT)
Dept: FAMILY MEDICINE CLINIC | Facility: CLINIC | Age: 75
End: 2025-07-07

## 2025-08-04 ENCOUNTER — OFFICE VISIT (OUTPATIENT)
Dept: FAMILY MEDICINE CLINIC | Facility: CLINIC | Age: 75
End: 2025-08-04
Payer: MEDICARE

## 2025-08-04 VITALS
DIASTOLIC BLOOD PRESSURE: 70 MMHG | WEIGHT: 193 LBS | TEMPERATURE: 97.3 F | HEART RATE: 73 BPM | SYSTOLIC BLOOD PRESSURE: 126 MMHG | OXYGEN SATURATION: 95 % | BODY MASS INDEX: 32.95 KG/M2 | HEIGHT: 64 IN

## 2025-08-04 DIAGNOSIS — J01.00 ACUTE NON-RECURRENT MAXILLARY SINUSITIS: ICD-10-CM

## 2025-08-04 DIAGNOSIS — J98.4 LUNG DISEASE: ICD-10-CM

## 2025-08-04 DIAGNOSIS — E66.811 CLASS 1 OBESITY WITH SERIOUS COMORBIDITY AND BODY MASS INDEX (BMI) OF 33.0 TO 33.9 IN ADULT, UNSPECIFIED OBESITY TYPE: Primary | ICD-10-CM

## 2025-08-04 DIAGNOSIS — J44.9 COPD MIXED TYPE: ICD-10-CM

## 2025-08-04 DIAGNOSIS — J84.10 FIBROSIS OF LUNG: ICD-10-CM

## 2025-08-04 PROCEDURE — 1160F RVW MEDS BY RX/DR IN RCRD: CPT | Performed by: NURSE PRACTITIONER

## 2025-08-04 PROCEDURE — 1125F AMNT PAIN NOTED PAIN PRSNT: CPT | Performed by: NURSE PRACTITIONER

## 2025-08-04 PROCEDURE — 99214 OFFICE O/P EST MOD 30 MIN: CPT | Performed by: NURSE PRACTITIONER

## 2025-08-04 PROCEDURE — 1159F MED LIST DOCD IN RCRD: CPT | Performed by: NURSE PRACTITIONER

## 2025-08-04 PROCEDURE — G2211 COMPLEX E/M VISIT ADD ON: HCPCS | Performed by: NURSE PRACTITIONER

## 2025-08-04 RX ORDER — AMOXICILLIN 875 MG/1
875 TABLET, COATED ORAL 2 TIMES DAILY
Qty: 20 TABLET | Refills: 0 | Status: SHIPPED | OUTPATIENT
Start: 2025-08-04

## 2025-08-20 ENCOUNTER — HOSPITAL ENCOUNTER (OUTPATIENT)
Dept: MAMMOGRAPHY | Facility: HOSPITAL | Age: 75
Discharge: HOME | End: 2025-08-20
Payer: MEDICARE

## 2025-08-20 ENCOUNTER — HOSPITAL ENCOUNTER (OUTPATIENT)
Dept: CT IMAGING | Facility: HOSPITAL | Age: 75
Discharge: HOME | End: 2025-08-20
Payer: MEDICARE

## 2025-08-20 DIAGNOSIS — R91.8 ABNORMAL CT SCAN OF LUNG: ICD-10-CM

## 2025-08-20 DIAGNOSIS — R91.1 LUNG NODULE: ICD-10-CM

## 2025-08-20 DIAGNOSIS — Z12.31 SCREENING MAMMOGRAM FOR BREAST CANCER: ICD-10-CM

## 2025-08-20 PROCEDURE — 77063 BREAST TOMOSYNTHESIS BI: CPT

## 2025-08-20 PROCEDURE — 77067 SCR MAMMO BI INCL CAD: CPT

## 2025-08-20 PROCEDURE — 71250 CT THORAX DX C-: CPT

## 2025-08-21 ENCOUNTER — RESULTS FOLLOW-UP (OUTPATIENT)
Dept: FAMILY MEDICINE CLINIC | Facility: CLINIC | Age: 75
End: 2025-08-21
Payer: OTHER GOVERNMENT

## (undated) DEVICE — SINGLE-USE BIOPSY FORCEPS: Brand: RADIAL JAW 4

## (undated) DEVICE — COLON KIT: Brand: MEDLINE INDUSTRIES, INC.

## (undated) DEVICE — SNAR E/S POLYP SNAREMASTER OVL/10MM 2.8X2300MM YEL

## (undated) DEVICE — SOL IRRG H2O PL/BG 1000ML STRL

## (undated) DEVICE — Device: Brand: DEFENDO AIR/WATER/SUCTION AND BIOPSY VALVE

## (undated) DEVICE — THE SINGLE USE ETRAP – POLYP TRAP IS USED FOR SUCTION RETRIEVAL OF ENDOSCOPICALLY REMOVED POLYPS.: Brand: ETRAP